# Patient Record
Sex: MALE | Race: WHITE | NOT HISPANIC OR LATINO | Employment: OTHER | URBAN - METROPOLITAN AREA
[De-identification: names, ages, dates, MRNs, and addresses within clinical notes are randomized per-mention and may not be internally consistent; named-entity substitution may affect disease eponyms.]

---

## 2019-09-26 ENCOUNTER — OFFICE VISIT (OUTPATIENT)
Dept: FAMILY MEDICINE | Facility: CLINIC | Age: 64
End: 2019-09-26
Payer: COMMERCIAL

## 2019-09-26 VITALS
HEIGHT: 67 IN | BODY MASS INDEX: 29.95 KG/M2 | HEART RATE: 67 BPM | WEIGHT: 190.81 LBS | DIASTOLIC BLOOD PRESSURE: 84 MMHG | SYSTOLIC BLOOD PRESSURE: 130 MMHG

## 2019-09-26 DIAGNOSIS — Z23 NEED FOR PNEUMOCOCCAL VACCINATION: ICD-10-CM

## 2019-09-26 DIAGNOSIS — Z12.5 SCREENING FOR PROSTATE CANCER: ICD-10-CM

## 2019-09-26 DIAGNOSIS — I10 ESSENTIAL HYPERTENSION, BENIGN: ICD-10-CM

## 2019-09-26 DIAGNOSIS — L57.0 ACTINIC KERATOSES: ICD-10-CM

## 2019-09-26 DIAGNOSIS — E78.2 MIXED HYPERLIPIDEMIA: Primary | ICD-10-CM

## 2019-09-26 PROCEDURE — G0009 ADMIN PNEUMOCOCCAL VACCINE: HCPCS | Mod: S$GLB,,, | Performed by: FAMILY MEDICINE

## 2019-09-26 PROCEDURE — G0009 PNEUMOCOCCAL POLYSACCHARIDE VACCINE 23-VALENT =>2YO SQ IM: ICD-10-PCS | Mod: S$GLB,,, | Performed by: FAMILY MEDICINE

## 2019-09-26 PROCEDURE — 90732 PNEUMOCOCCAL POLYSACCHARIDE VACCINE 23-VALENT =>2YO SQ IM: ICD-10-PCS | Mod: S$GLB,,, | Performed by: FAMILY MEDICINE

## 2019-09-26 PROCEDURE — 99214 PR OFFICE/OUTPT VISIT, EST, LEVL IV, 30-39 MIN: ICD-10-PCS | Mod: 25,S$GLB,, | Performed by: FAMILY MEDICINE

## 2019-09-26 PROCEDURE — 90732 PPSV23 VACC 2 YRS+ SUBQ/IM: CPT | Mod: S$GLB,,, | Performed by: FAMILY MEDICINE

## 2019-09-26 PROCEDURE — 99214 OFFICE O/P EST MOD 30 MIN: CPT | Mod: 25,S$GLB,, | Performed by: FAMILY MEDICINE

## 2019-09-26 RX ORDER — PRAVASTATIN SODIUM 20 MG/1
TABLET ORAL
COMMUNITY
Start: 2019-09-14 | End: 2019-09-26 | Stop reason: ALTCHOICE

## 2019-09-26 RX ORDER — AMLODIPINE AND BENAZEPRIL HYDROCHLORIDE 10; 20 MG/1; MG/1
1 CAPSULE ORAL DAILY
Qty: 90 CAPSULE | Refills: 1 | Status: SHIPPED | OUTPATIENT
Start: 2019-09-26 | End: 2020-05-06 | Stop reason: SDUPTHER

## 2019-09-26 RX ORDER — ATORVASTATIN CALCIUM 40 MG/1
40 TABLET, FILM COATED ORAL DAILY
Qty: 90 TABLET | Refills: 3 | Status: SHIPPED | OUTPATIENT
Start: 2019-09-26 | End: 2020-11-09 | Stop reason: SDUPTHER

## 2019-09-26 RX ORDER — AMLODIPINE AND BENAZEPRIL HYDROCHLORIDE 10; 20 MG/1; MG/1
CAPSULE ORAL
Refills: 1 | COMMUNITY
Start: 2019-08-24 | End: 2019-09-26 | Stop reason: SDUPTHER

## 2019-09-26 NOTE — PROGRESS NOTES
SUBJECTIVE:    Patient ID: Aleksandar Knight is a 64 y.o. male.    Chief Complaint: Hypertension (check up) and Hyperlipidemia    Patient with hypertension hyperlipidemia doing well on current therapy.  He will be having colon cancer screening on next month.  He had recent issues with actinic keratosis ease and has followed up with Dermatology.  Biopsies demonstrated that nothing was pre cancerous.  He has regular follow-up in 6 months.  He is up-to-date with immunizations.  Lab review demonstrates that LDL could be a bit lower.  It has improved over the last year and a half.  Medication adjustments should be made.      Past Medical History:   Diagnosis Date    Hyperlipidemia     Hypertension      History reviewed. No pertinent surgical history.  History reviewed. No pertinent family history.    Marital Status: Single  Alcohol History:  reports that he does not drink alcohol.  Tobacco History:  reports that he has never smoked. He has never used smokeless tobacco.  Drug History:  reports that he does not use drugs.    Review of patient's allergies indicates:  No Known Allergies    Current Outpatient Medications:     amlodipine-benazepril 10-20mg (LOTREL) 10-20 mg per capsule, Take 1 capsule by mouth once daily., Disp: 90 capsule, Rfl: 1    atorvastatin (LIPITOR) 40 MG tablet, Take 1 tablet (40 mg total) by mouth once daily. For cholesterol, Disp: 90 tablet, Rfl: 3    Review of Systems   Constitutional: Negative for fatigue, fever and unexpected weight change.   HENT: Negative for congestion, postnasal drip, rhinorrhea and sore throat.    Eyes: Negative for photophobia, pain and visual disturbance.   Respiratory: Negative for cough, shortness of breath and wheezing.    Cardiovascular: Negative for chest pain and palpitations.   Gastrointestinal: Negative for constipation, diarrhea, nausea and vomiting.   Genitourinary: Negative for difficulty urinating, dysuria, frequency, hematuria and urgency.   Musculoskeletal:  "Negative for arthralgias and myalgias.   Skin: Negative for rash.   Neurological: Negative for dizziness and headaches.   Psychiatric/Behavioral: Negative for sleep disturbance.          Objective:      Vitals:    09/26/19 0810   BP: 130/84   Pulse: 67   Weight: 86.5 kg (190 lb 12.8 oz)   Height: 5' 7" (1.702 m)     Physical Exam   Constitutional: He appears well-developed and well-nourished.   HENT:   Head: Normocephalic.   Mouth/Throat: Oropharynx is clear and moist.   Eyes: Pupils are equal, round, and reactive to light. Conjunctivae and EOM are normal.   Neck: Normal range of motion. Neck supple. No thyromegaly present.   Cardiovascular: Normal rate and regular rhythm. Exam reveals no friction rub.   No murmur heard.  Pulmonary/Chest: Effort normal and breath sounds normal.   Abdominal: Soft. Bowel sounds are normal. He exhibits no mass. There is no tenderness.   Musculoskeletal: Normal range of motion. He exhibits no edema.   Neurological: No cranial nerve deficit. Coordination normal.   Skin: Skin is warm and dry. No rash noted.   Psychiatric: He has a normal mood and affect.         Assessment:       1. Mixed hyperlipidemia    2. Essential hypertension, benign    3. Actinic keratoses    4. Screening for prostate cancer    5. Need for pneumococcal vaccination         Plan:       Mixed hyperlipidemia  -     atorvastatin (LIPITOR) 40 MG tablet; Take 1 tablet (40 mg total) by mouth once daily. For cholesterol  Dispense: 90 tablet; Refill: 3  -     Comprehensive metabolic panel; Future; Expected date: 03/02/2020  -     Lipid panel; Future; Expected date: 03/02/2020    Essential hypertension, benign  -     amlodipine-benazepril 10-20mg (LOTREL) 10-20 mg per capsule; Take 1 capsule by mouth once daily.  Dispense: 90 capsule; Refill: 1  -     CBC auto differential; Future; Expected date: 03/02/2020  -     Comprehensive metabolic panel; Future; Expected date: 03/02/2020    Actinic keratoses  Comments:  Continue per " Dr. Mohr    Screening for prostate cancer  -     PSA, Screening; Future; Expected date: 03/02/2020    Need for pneumococcal vaccination  -     Pneumococcal Polysaccharide Vaccine (23 Valent) (SQ/IM)      Follow up in about 6 months (around 3/26/2020) for Annual Physical.

## 2019-09-26 NOTE — PATIENT INSTRUCTIONS
Understanding Your Cholesterol Numbers  The higher your blood cholesterol, the greater your risk for heart attack (acute myocardial infarction), cardiovascular disease, or stroke. High cholesterol can cause any artery in your body to become narrow. Thats why you need to know your cholesterol level. If its high, you can take steps to bring it down. Eating the right foods and getting enough exercise can help. Some people also need medicine to control their cholesterol. Your healthcare provider can help you get started on a plan to control your cholesterol.        Blood flows easily when arteries are clear.      Less blood flows when cholesterol builds up in artery walls.   Checking your cholesterol  Your cholesterol is checked with a simple blood test. The results tell you how much cholesterol you have in your blood. Get checked as often as your healthcare provider suggests. If you have diabetes or high cholesterol, you may need your blood tested as often as every 3 months. As you work to lower your cholesterol, your numbers will change slowly. But they will change. Be patient and stay on track. Discuss your numbers with your healthcare provider.   Your total cholesterol number  A blood test will give you a number for the total amount of cholesterol in your blood. The higher this number, the more likely it is that cholesterol will build up in your blood vessels. Even if your cholesterol is just slightly high, you are at increased risk for health problems.  My total cholesterol is: ________________  Your lipid numbers  Total cholesterol is just one part of the story. Cholesterol is made up of different kinds of fats (lipids). If your total cholesterol is high, knowing your lipid profile is important. The 2 most important lipids are HDL and LDL. Lipids are checked after you have fasted. This means you dont eat for a certain amount of time before the test is done. Along with cholesterol, triglyceride can also lead  to blocked arteries. Triglycerides are another type of fat. Knowing your HDL, LDL, and triglyceride numbers, as well as your total cholesterol gives you a more complete picture of your cholesterol level:  · HDL is called the good cholesterol. It moves out of the bloodstream and does not block your blood vessels. HDL levels are affected by how much you exercise and what you eat.My HDL cholesterol is: ________________  · LDL is called the bad cholesterol. This is because it can stick to your artery walls and block blood flow. LDL levels are most affected by what you eat and by your genes.My LDL cholesterol is: ________________  · Triglyceride is a type of fat the body uses to store energy. Too much triglyceride can increase your risk for heart disease. Triglyceride levels should be under 150.My triglyceride is:  ________________  Based on your other health issues and risk factors, your healthcare provider may have specific goals for treating your cholesterol. You may need to use different kinds of medicines that work on the different types of cholesterol. Work with your provider to know what your goals of treatment are. Stick with your treatment plan to reach the goals you set.  High-risk groups  Some people may need to take medicines called statins to control their cholesterol. This is in addition to eating a healthy diet and getting regular exercise.  Statins can help you stay healthy. They can also help prevent a heart attack or stroke. You may need to take a statin if you are in one of these groups:  · Adults who have had a heart attack or stroke. Or adults who have had peripheral vascular disease, a ministroke (transient ischemic attack), or stable or unstable angina. This group also includes people who have had a procedure to restore blood flow through a blocked artery. These procedures include percutaneous coronary intervention, angioplasty, stent, and open-heart bypass surgery.  · Adults who have diabetes.  Or adults who are at higher risk of having a heart attack or stroke and have an LDL cholesterol level of 70 to 189 mg/dL  · Adults who are 21 years old or older and have an LDL cholesterol level of 190 mg/dL or higher.  If you are in a high-risk group, talk with your healthcare provider about your treatment goals. Make sure you understand why these goals are important, based on your own health history and your family history of heart disease or high cholesterol.  Make a plan to have regular cholesterol checks. You may need to fast before getting this test. Also ask your provider about any side effects your medicines may cause. Let your provider know about any side effects you have. You may need to take more than one medicine to reach the cholesterol goals that you and your provider decide on.  Date Last Reviewed: 10/1/2016  © 7411-7525 [employer]. [address] [place] [address], [place] [address]. All rights reserved. This information is not intended as a substitute for professional medical care. Always follow your healthcare professional's instructions.

## 2019-10-31 ENCOUNTER — OFFICE VISIT (OUTPATIENT)
Dept: GASTROENTEROLOGY | Facility: CLINIC | Age: 64
End: 2019-10-31
Payer: COMMERCIAL

## 2019-10-31 VITALS
BODY MASS INDEX: 31.39 KG/M2 | SYSTOLIC BLOOD PRESSURE: 161 MMHG | TEMPERATURE: 98 F | WEIGHT: 200 LBS | HEIGHT: 67 IN | DIASTOLIC BLOOD PRESSURE: 71 MMHG | HEART RATE: 78 BPM

## 2019-10-31 DIAGNOSIS — Z12.11 ENCOUNTER FOR SCREENING COLONOSCOPY: Primary | ICD-10-CM

## 2019-10-31 DIAGNOSIS — E78.5 HYPERLIPIDEMIA, UNSPECIFIED HYPERLIPIDEMIA TYPE: ICD-10-CM

## 2019-10-31 DIAGNOSIS — R10.9 ABDOMINAL PAIN, UNSPECIFIED ABDOMINAL LOCATION: Primary | ICD-10-CM

## 2019-10-31 DIAGNOSIS — I10 ESSENTIAL HYPERTENSION: ICD-10-CM

## 2019-10-31 PROCEDURE — 99205 PR OFFICE/OUTPT VISIT, NEW, LEVL V, 60-74 MIN: ICD-10-PCS | Mod: S$GLB,,, | Performed by: INTERNAL MEDICINE

## 2019-10-31 PROCEDURE — 99205 OFFICE O/P NEW HI 60 MIN: CPT | Mod: S$GLB,,, | Performed by: INTERNAL MEDICINE

## 2019-10-31 RX ORDER — SODIUM, POTASSIUM,MAG SULFATES 17.5-3.13G
1 SOLUTION, RECONSTITUTED, ORAL ORAL DAILY
Qty: 1 KIT | Refills: 0 | Status: SHIPPED | OUTPATIENT
Start: 2019-10-31 | End: 2019-11-02

## 2019-10-31 NOTE — PROGRESS NOTES
[facility] New Patient Visit    Subjective:           PCP: Aditya Pulliam    Referring Provider: No ref. provider found    Chief Complaint: Colonoscopy       HPI:  Aleksandar Knight is a 64 y.o. male here for evaluation of colonoscopy.  There is no history of previous colonoscopy. Patient denies history of dysphagia, odynophagia, early satiety, hematemesis, fever, chills, or rigors.  Patient has hx of htn and hld. He denies lower GI symptoms or rectal bleeding. There are no urinary complaints. Patient does not smoke or drink or chew tobacco. A detailed physical examination is noncontributory. He may need to be checked for hepatitis as per guidelines. There is a fhx of cancer.     ROS:   Constitutional: No fevers, chills, weight loss  ENT: No allergies, sore throat, rhinorrhea  CV: No chest pain, palpitations, edema  Pulm: No cough, shortness of breath, wheezing  Ophtho: No vision changes  GI: No blood in stools, change in bowel habits, nausea/vomiting  Denies alternating diarrhea/constipation.   Derm: No rash  Heme: No easy bruising or lymphadenopathy  MSK: No arthralgias or myalgias  : No dysuria, hematuria, frequency, polyuria, or flank tenderness  Endo: No hot or cold intolerance  Neuro: No syncope or seizure, or dizziness  Psych: No hallucination, depression or suicidal ideation      Medical History:  has a past medical history of Hyperlipidemia and Hypertension.      Surgical History:  has no past surgical history on file.    Family History:   Family History   Problem Relation Age of Onset    Cancer Father         unkn type       Social History:   Social History     Tobacco Use    Smoking status: Never Smoker    Smokeless tobacco: Never Used   Substance Use Topics    Alcohol use: Never     Frequency: Never    Drug use: Never       The patient's social and family histories were reviewed by me and updated in the appropriate section of the electronic medical record.    Allergies: Review of  "patient's allergies indicates:  No Known Allergies    Medications:   Current Outpatient Medications   Medication Sig Dispense Refill    amlodipine-benazepril 10-20mg (LOTREL) 10-20 mg per capsule Take 1 capsule by mouth once daily. 90 capsule 1    atorvastatin (LIPITOR) 40 MG tablet Take 1 tablet (40 mg total) by mouth once daily. For cholesterol 90 tablet 3     No current facility-administered medications for this visit.      All medications and past history have been reviewed.        Objective:        Vital Signs:  Blood pressure (!) 161/71, pulse 78, temperature 97.8 °F (36.6 °C), height 5' 7" (1.702 m), weight 90.7 kg (200 lb). Body mass index is 31.32 kg/m².    Physical Exam:   General Appearance: Well appearing in no acute distress, well developed, well                 nourished  Head: Normocephalic, without obvious abnormality  Eyes:  Pupils equal, round and reactive to light  Throat: Lips, mucosa, and tongue normal; teeth and gums normal  Lungs: CTA bilaterally in anterior and posterior fields, no wheezes, no crackles  Heart:  Regular rate and rhythm, no murmurs heard  Abdomen: Soft, non tender, non distended with positive bowel sounds in all four quadrants. No hepatosplenomegaly, ascites, or mass. Negative for succusion splash  : male  Extremities: No cyanosis, edema or deformity  Skin: No rash  Neurologic: Normal exam      Labs/Imaging:  All lab results and imaging results have been reviewed and discussed with the patient.    Assessment/Plan:    Assessment:       No diagnosis found.    Plan:       There are no diagnoses linked to this encounter.    See HPI    No follow-ups on file.    The plan was discussed with the patient and all questions/concerns have been answered to the patient's satisfaction.    CC: No ref. provider found    Electronically signed by Nabeel Hart MD    This note was dictated using voice recognition software and may contain grammatical errors.        "

## 2019-11-20 ENCOUNTER — HOSPITAL ENCOUNTER (OUTPATIENT)
Facility: HOSPITAL | Age: 64
Discharge: HOME | End: 2019-11-20
Attending: INTERNAL MEDICINE | Admitting: INTERNAL MEDICINE
Payer: COMMERCIAL

## 2019-11-20 VITALS
HEART RATE: 52 BPM | BODY MASS INDEX: 31.08 KG/M2 | RESPIRATION RATE: 12 BRPM | DIASTOLIC BLOOD PRESSURE: 44 MMHG | WEIGHT: 198 LBS | SYSTOLIC BLOOD PRESSURE: 110 MMHG | HEIGHT: 67 IN | OXYGEN SATURATION: 100 % | TEMPERATURE: 98 F

## 2019-11-20 DIAGNOSIS — Z12.11 SCREEN FOR COLON CANCER: ICD-10-CM

## 2019-11-20 PROCEDURE — 63600175 PHARM REV CODE 636 W HCPCS: Performed by: INTERNAL MEDICINE

## 2019-11-20 PROCEDURE — 45380 PR COLONOSCOPY,BIOPSY: ICD-10-PCS | Mod: 33,,, | Performed by: INTERNAL MEDICINE

## 2019-11-20 PROCEDURE — 45380 COLONOSCOPY AND BIOPSY: CPT | Performed by: INTERNAL MEDICINE

## 2019-11-20 PROCEDURE — 27200043 HC FORCEPS, BIOPSY: Performed by: INTERNAL MEDICINE

## 2019-11-20 PROCEDURE — 45380 COLONOSCOPY AND BIOPSY: CPT | Mod: 33,,, | Performed by: INTERNAL MEDICINE

## 2019-11-20 PROCEDURE — 99152 MOD SED SAME PHYS/QHP 5/>YRS: CPT | Performed by: INTERNAL MEDICINE

## 2019-11-20 PROCEDURE — 99153 MOD SED SAME PHYS/QHP EA: CPT | Performed by: INTERNAL MEDICINE

## 2019-11-20 PROCEDURE — 88305 TISSUE EXAM BY PATHOLOGIST: CPT | Mod: TC,59

## 2019-11-20 RX ORDER — SODIUM CHLORIDE 9 MG/ML
INJECTION, SOLUTION INTRAVENOUS CONTINUOUS
Status: DISCONTINUED | OUTPATIENT
Start: 2019-11-20 | End: 2019-11-20 | Stop reason: HOSPADM

## 2019-11-20 RX ORDER — SODIUM CHLORIDE 0.9 % (FLUSH) 0.9 %
2 SYRINGE (ML) INJECTION
Status: DISCONTINUED | OUTPATIENT
Start: 2019-11-20 | End: 2019-11-20 | Stop reason: HOSPADM

## 2019-11-20 RX ORDER — MEPERIDINE HYDROCHLORIDE 100 MG/ML
INJECTION INTRAMUSCULAR; INTRAVENOUS; SUBCUTANEOUS
Status: COMPLETED | OUTPATIENT
Start: 2019-11-20 | End: 2019-11-20

## 2019-11-20 RX ORDER — MIDAZOLAM HYDROCHLORIDE 1 MG/ML
INJECTION INTRAMUSCULAR; INTRAVENOUS
Status: COMPLETED | OUTPATIENT
Start: 2019-11-20 | End: 2019-11-20

## 2019-11-20 RX ORDER — DIAZEPAM 10 MG/2ML
INJECTION INTRAMUSCULAR
Status: COMPLETED | OUTPATIENT
Start: 2019-11-20 | End: 2019-11-20

## 2019-11-20 RX ADMIN — Medication 50 MG: at 11:11

## 2019-11-20 RX ADMIN — MIDAZOLAM HYDROCHLORIDE 1 MG: 1 INJECTION, SOLUTION INTRAMUSCULAR; INTRAVENOUS at 12:11

## 2019-11-20 RX ADMIN — Medication 25 MG: at 12:11

## 2019-11-20 RX ADMIN — DIAZEPAM 5 MG: 5 INJECTION, SOLUTION INTRAMUSCULAR; INTRAVENOUS at 11:11

## 2019-11-20 RX ADMIN — MIDAZOLAM HYDROCHLORIDE 2 MG: 1 INJECTION, SOLUTION INTRAMUSCULAR; INTRAVENOUS at 11:11

## 2019-11-20 NOTE — H&P
[facility]  History & Physical - Short Stay    Subjective:      Chief Complaint/Reason for Admission: Colonoscopy    There are no hospital problems to display for this patient.      History of Present Illness:    Aleksandar Knight is a 64 y.o. male here for evaluation of colonoscopy.  There is no history of previous colonoscopy. Patient denies history of dysphagia, odynophagia, early satiety, hematemesis, fever, chills, or rigors.  Patient has hx of htn and hld. He denies lower GI symptoms or rectal bleeding. There are no urinary complaints. Patient does not smoke or drink or chew tobacco. A detailed physical examination is noncontributory. He may need to be checked for hepatitis as per guidelines. There is a fhx of cancer.  No current facility-administered medications for this encounter.     Current Outpatient Medications:     amlodipine-benazepril 10-20mg (LOTREL) 10-20 mg per capsule, Take 1 capsule by mouth once daily., Disp: 90 capsule, Rfl: 1    atorvastatin (LIPITOR) 40 MG tablet, Take 1 tablet (40 mg total) by mouth once daily. For cholesterol, Disp: 90 tablet, Rfl: 3  No medications prior to admission.       Review of patient's allergies indicates:  No Known Allergies     Past Medical History:   Diagnosis Date    Hyperlipidemia     Hypertension            OBJECTIVE:     Vital Signs (Most Recent):          Physical Exam:  General Appearance: Well appearing in no acute distress, well developed, well                 nourished  Head: Normocephalic, without obvious abnormality  Eyes:  Pupils equal, round and reactive to light  Throat: Lips, mucosa, and tongue normal; teeth and gums normal  Lungs: CTA bilaterally in anterior and posterior fields, no wheezes, no crackles  Heart:  Regular rate and rhythm, no murmurs heard  Abdomen: Soft, non tender, non distended with positive bowel sounds in all four quadrants. No hepatosplenomegaly, ascites, or mass. Negative for succusion splash  :  male  Extremities: No cyanosis, edema or deformity  Skin: No rash  Neurologic: Normal exam      ASSESSMENT/PLAN:         Plan: Colonoscopy

## 2019-11-20 NOTE — DISCHARGE INSTRUCTIONS
Understanding Colon and Rectal Polyps    The colon (also called the large intestine) is a muscular tube that forms the last part of the digestive tract. It absorbs water and stores food waste. The colon is about 4 to 6 feet long. The rectum is the last 6 inches of the colon. The colon and rectum have a smooth lining composed of millions of cells. Changes in these cells can lead to growths in the colon that can become cancerous and should be removed. Multiple tests are available to screen for colon cancer, but the colonoscopy is the most recommended test. During colonoscopy, these polyps can be removed. How often you need this test depends on many things including your condition, your family history, symptoms, and what the findings were at the previous colonoscopy.   When the colon lining changes  Changes that happen in the cells that line the colon or rectum can lead to growths called polyps. Over a period of years, polyps can turn cancerous. Removing polyps early may prevent cancer from ever forming.  Polyps  Polyps are fleshy clumps of tissue that form on the lining of the colon or rectum. Small polyps are usually benign (not cancerous). However, over time, cells in a polyp can change and become cancerous. Certain types of polyps known as adenomatous polyps are premalignant. The risk for invasive cancer increases with the size of the polyp and certain cell and gene features. This means that they can become cancerous if they're not removed. Hyperplastic polyps are benign. They can grow quite large and not turn cancerous.   Cancer  Almost all colorectal cancers start when polyp cells begin growing abnormally. As a cancerous tumor grows, it may involve more and more of the colon or rectum. In time, cancer can also grow beyond the colon or rectum and spread to nearby organs or to glands called lymph nodes. The cells can also travel to other parts of the body. This is known as metastasis. The earlier a cancerous  tumor is removed, the better the chance of preventing its spread.    Date Last Reviewed: 8/1/2016  © 3264-3094 [employer]. [address] [place] [address], [place] [address]. All rights reserved. This information is not intended as a substitute for professional medical care. Always follow your healthcare professional's instructions.        Eating a High-Fiber Diet  Fiber is what gives strength and structure to plants. Most grains, beans, vegetables, and fruits contain fiber. Foods rich in fiber are often low in calories and fat, and they fill you up more. They may also reduce your risks for certain health problems. To find out the amount of fiber in canned, packaged, or frozen foods, read the Nutrition Facts label. It tells you how much fiber is in a serving.    Types of fiber and their benefits  There are two types of fiber: insoluble and soluble. They both aid digestion and help you maintain a healthy weight.  · Insoluble fiber. This is found in whole grains, cereals, certain fruits and vegetables such as apple skin, corn, and carrots. Insoluble fiber may prevent constipation and reduce the risk for certain types of cancer.  · Soluble fiber. This type of fiber is in oats, beans, and certain fruits and vegetables such as strawberries and peas. Soluble fiber can reduce cholesterol, which may help lower the risk for heart disease. It also helps control blood sugar levels.  Look for high-fiber foods  Try these foods to add fiber to your diet:  · Whole-grain breads and cereals. Try to eat 6 to 8 ounces a day. Include wheat and oat bran cereals, whole-wheat muffins or toast, and corn tortillas in your meals.  · Fruits. Try to eat 2 cups a day. Apples, oranges, strawberries, pears, and bananas are good sources. (Note: Fruit juice is low in fiber.)  · Vegetables. Try to eat at least 2.5 cups a day. Add asparagus, carrots, broccoli, peas, and corn to your meals.  · Beans. One cup of cooked lentils gives you over 15  grams of fiber. Try navy beans, lentils, and chickpeas.  · Seeds. A small handful of seeds gives you about 3 grams of fiber. Try sunflower seeds.  Keep track of your fiber  Keep track of how much fiber you eat. Start by reading food labels. Then eat a variety of foods high in fiber. As you begin to eat more fiber, ask your healthcare provider how much water you should be drinking to keep your digestive system working smoothly.  You should aim for a certain amount of fiber in your diet each day. If you are a woman, that amount is between 25 and 28 grams per day. Men should aim for 30 to 33 grams per day. After age 50, your daily fiber needs drop to 22 grams for women and 28 grams for men.  Before you reach for the fiber supplements, think about this. Fiber is found naturally in healthy whole foods. It gives you that feeling of fullness after you eat. Taking fiber supplements or eating fiber-enriched foods will not give you this full feeling.  Your fiber intake is a good measure for the quality of your overall diet. If you are missing out on your daily amount of fiber, you may be lacking other important nutrients as well.  Date Last Reviewed: 5/11/2015  © 2800-0212 [employer]. [address] [place] [address], [place] [address]. All rights reserved. This information is not intended as a substitute for professional medical care. Always follow your healthcare professional's instructions.        Colonoscopy     A camera attached to a flexible tube with a viewing lens is used to take video pictures.     Colonoscopy is a test to view the inside of your lower digestive tract (colon and rectum). Sometimes it can show the last part of the small intestine (ileum). During the test, small pieces of tissue may be removed for testing. This is called a biopsy. Small growths, such as polyps, may also be removed.   Why is colonoscopy done?  The test is done to help look for colon cancer. And it can help find the source of  abdominal pain, bleeding, and changes in bowel habits. It may be needed once a year, depending on factors such as your:  · Age  · Health history  · Family health history  · Symptoms  · Results from any prior colonoscopy  Risks and possible complications  These include:  · Bleeding               · A puncture or tear in the colon   · Risks of anesthesia  · A cancer lesion not being seen  Getting ready   To prepare for the test:  · Talk with your healthcare provider about the risks of the test (see below). Also ask your healthcare provider about alternatives to the test.  · Tell your healthcare provider about any medicines you take. Also tell him or her about any health conditions you may have.  · Make sure your rectum and colon are empty for the test. Follow the diet and bowel prep instructions exactly. If you dont, the test may need to be rescheduled.  · Plan for a friend or family member to drive you home after the test.     Colonoscopy provides an inside view of the entire colon.     You may discuss the results with your doctor right away or at a future visit.  During the test   The test is usually done in the hospital on an outpatient basis. This means you go home the same day. The procedure takes about 30 minutes. During that time:  · You are given relaxing (sedating) medicine through an IV line. You may be drowsy, or fully asleep.  · The healthcare provider will first give you a physical exam to check for anal and rectal problems.  · Then the anus is lubricated and the scope inserted.  · If you are awake, you may have a feeling similar to needing to have a bowel movement. You may also feel pressure as air is pumped into the colon. Its OK to pass gas during the procedure.  · Biopsy, polyp removal, or other treatments may be done during the test.  After the test   You may have gas right after the test. It can help to try to pass it to help prevent later bloating. Your healthcare provider may discuss the results  with you right away. Or you may need to schedule a follow-up visit to talk about the results. After the test, you can go back to your normal eating and other activities. You may be tired from the sedation and need to rest for a few hours.  Date Last Reviewed: 11/1/2016  © 9868-1509 [email or web address]. [address] [place] [address], [place] [address]. All rights reserved. This information is not intended as a substitute for professional medical care. Always follow your healthcare professional's instructions.    No Driving, making important decisions for the next 24 hours   Report to ER   1 tablespoon of Bright red blood from rectum   Sever chest pain   Sever shortness of breath  Sever abdominal pain   No Asprin products or NSIDS for 1 [place]

## 2019-11-23 NOTE — [employer] PATIENT INSTRUCTIONS
Discharge Summary/Instructions after an Endoscopic Procedure  Patient Name: Aleksandar Knight  Patient MRN: [id number]  Patient YOB: 1955 Wednesday, November 20, 2019  Nabeel Hart MD  RESTRICTIONS:  During your procedure today, you received medications for sedation.  These   medications may affect your judgment, balance and coordination.  Therefore,   for 24 hours, you have the following restrictions:   - DO NOT drive a car, operate machinery, make legal/financial decisions,   sign important papers or drink alcohol.    ACTIVITY:  Today: no heavy lifting, straining or running due to procedural   sedation/anesthesia.  The following day: return to full activity including work.  DIET:  Eat and drink normally unless instructed otherwise.     TREATMENT FOR COMMON SIDE EFFECTS:  - Mild abdominal pain, nausea, belching, bloating or excessive gas:  rest,   eat lightly and use a heating pad.  - Sore Throat: treat with throat lozenges and/or gargle with warm salt   water.  - Because air was used during the procedure, expelling large amounts of air   from your rectum or belching is normal.  - If a bowel prep was taken, you may not have a bowel movement for 1-3 days.    This is normal.  SYMPTOMS TO WATCH FOR AND REPORT TO YOUR PHYSICIAN:  1. Abdominal pain or bloating, other than gas cramps.  2. Chest pain.  3. Back pain.  4. Signs of infection such as: chills or fever occurring within 24 hours   after the procedure.  5. Rectal bleeding, which would show as bright red, maroon, or black stools.   (A tablespoon of blood from the rectum is not serious, especially if   hemorrhoids are present.)  6. Vomiting.  7. Weakness or dizziness.  GO DIRECTLY TO THE NEAREST EMERGENCY ROOM IF YOU HAVE ANY OF THE FOLLOWING:      Difficulty breathing              Chills and/or fever over 101 F   Persistent vomiting and/or vomiting blood   Severe abdominal pain   Severe chest pain   Black, tarry stools   Bleeding- more than one  tablespoon   Any other symptom or condition that you feel may need urgent attention  Your doctor recommends these additional instructions:  If any biopsies were taken, your doctors clinic will contact you in 1 to 2   weeks with any results.  - Discharge patient to home (with escort).  For questions, problems or results please call your physician - Nabeel Hart MD at Work:  [phone].  [facility], EMERGENCY ROOM PHONE NUMBER: [phone]  IF A COMPLICATION OR EMERGENCY SITUATION ARISES AND YOU ARE UNABLE TO REACH   YOUR PHYSICIAN - GO DIRECTLY TO THE EMERGENCY ROOM.  Nabeel Hart MD  11/22/2019 6:11:43 PM  This report has been verified and signed electronically.  [employer]

## 2019-12-03 ENCOUNTER — OFFICE VISIT (OUTPATIENT)
Dept: GASTROENTEROLOGY | Facility: CLINIC | Age: 64
End: 2019-12-03
Payer: COMMERCIAL

## 2019-12-03 VITALS
HEIGHT: 67 IN | SYSTOLIC BLOOD PRESSURE: 141 MMHG | WEIGHT: 198.19 LBS | BODY MASS INDEX: 31.11 KG/M2 | HEART RATE: 85 BPM | TEMPERATURE: 97 F | DIASTOLIC BLOOD PRESSURE: 73 MMHG

## 2019-12-03 DIAGNOSIS — Z80.9 FAMILY HISTORY OF CANCER: ICD-10-CM

## 2019-12-03 DIAGNOSIS — K64.9 HEMORRHOIDS, UNSPECIFIED HEMORRHOID TYPE: ICD-10-CM

## 2019-12-03 DIAGNOSIS — K63.5 POLYP OF SIGMOID COLON, UNSPECIFIED TYPE: Primary | ICD-10-CM

## 2019-12-03 PROCEDURE — 1126F AMNT PAIN NOTED NONE PRSNT: CPT | Mod: S$GLB,,, | Performed by: INTERNAL MEDICINE

## 2019-12-03 PROCEDURE — 1159F MED LIST DOCD IN RCRD: CPT | Mod: S$GLB,,, | Performed by: INTERNAL MEDICINE

## 2019-12-03 PROCEDURE — 99214 PR OFFICE/OUTPT VISIT, EST, LEVL IV, 30-39 MIN: ICD-10-PCS | Mod: S$GLB,,, | Performed by: INTERNAL MEDICINE

## 2019-12-03 PROCEDURE — 99214 OFFICE O/P EST MOD 30 MIN: CPT | Mod: S$GLB,,, | Performed by: INTERNAL MEDICINE

## 2019-12-03 PROCEDURE — 1159F PR MEDICATION LIST DOCUMENTED IN MEDICAL RECORD: ICD-10-PCS | Mod: S$GLB,,, | Performed by: INTERNAL MEDICINE

## 2019-12-03 PROCEDURE — 1126F PR PAIN SEVERITY QUANTIFIED, NO PAIN PRESENT: ICD-10-PCS | Mod: S$GLB,,, | Performed by: INTERNAL MEDICINE

## 2019-12-03 NOTE — PROGRESS NOTES
[facility] Established Patient Visit    Subjective:           PCP: Aditya Pulliam    Chief Complaint: Follow-up and Colonoscopy       HPI:  Aleksandar Knight Sr. is a 64 y.o. male here for follow-up of colonoscopy, colonoscopy showed 5 mm polyp in the sigmoid colon, diverticula, hemorrhoids, detailed physical examination was done, history of dysphagia or odynophagia or early satiety hematemesis fever chills or rigors, patient has hyperlipidemia, history of hypertension,  Biopsies were reviewed with the pathologist, this showed no histopathology diagnosis, detailed examination is noncontributory, meds reviewed,see patient back in 4-6 months patient, did not have his blood drawn and will be reminded that he needs to do so continue same management at this time. BMI 31.04, family history of cancer    ROS:   Constitutional: No fevers, chills, weight loss  ENT: No allergies, sore throat, rhinorrhea  CV: No chest pain, palpitations, edema  Pulm: No cough, shortness of breath, wheezing  Ophtho: No vision changes  GI: No blood in stools, change in bowel habits, nausea/vomiting  BMI 31.04, diverticula, hemorrhoids, colon polyps  Denies alternating diarrhea/constipation.   Derm: No rash  Heme: No easy bruising or lymphadenopathy  MSK: No arthralgias or myalgias  : No dysuria, hematuria, frequency, polyuria, or flank tenderness  Endo: No hot or cold intolerance  Neuro: No syncope or seizure, or dizziness  Psych: No hallucination, depression or suicidal ideation      Medical History:  has a past medical history of Hyperlipidemia and Hypertension.      Surgical History:  has a past surgical history that includes Colonoscopy (N/A, 11/20/2019).    Family History:   Family History   Problem Relation Age of Onset    Cancer Father         unkn type       Social History:   Social History     Tobacco Use    Smoking status: Never Smoker    Smokeless tobacco: Never Used   Substance Use Topics    Alcohol use: Never      "Frequency: Never    Drug use: Never       The patient's social and family histories were reviewed by me and updated in the appropriate section of the electronic medical record.    Allergies: Review of patient's allergies indicates:  No Known Allergies      Medications:   Current Outpatient Medications   Medication Sig Dispense Refill    amlodipine-benazepril 10-20mg (LOTREL) 10-20 mg per capsule Take 1 capsule by mouth once daily. 90 capsule 1    atorvastatin (LIPITOR) 40 MG tablet Take 1 tablet (40 mg total) by mouth once daily. For cholesterol 90 tablet 3     No current facility-administered medications for this visit.      All medications and past history have been reviewed.        Objective:        Vital Signs:  Blood pressure (!) 141/73, pulse 85, temperature 97.2 °F (36.2 °C), height 5' 7" (1.702 m), weight 89.9 kg (198 lb 3.2 oz). Body mass index is 31.04 kg/m².    Physical Exam:   General Appearance: Well appearing in no acute distress, well developed, well                nourished  Head: Normocephalic, without obvious abnormality  Eyes:  Pupils equal, round and reactive to light  Throat: Lips, mucosa, and tongue normal; teeth and gums normal  Lungs: CTA bilaterally in anterior and posterior fields, no wheezes, no crackles  Heart:  Regular rate and rhythm, no murmurs heard  Abdomen: Soft, non tender, non distended with positive bowel sounds in all four quadrants. No hepatosplenomegaly, ascites, or mass. Negative for succusion splash  : male   Extremities: No cyanosis, edema or deformity  Skin: No rash  Neurologic: Normal exam    Labs:  No results found for: WBC, HGB, HCT, PLT, CHOL, TRIG, HDL, LDLDIRECT, ALT, AST, NA, K, CL, CREATININE, BUN, CO2, TSH, PSA, INR, GLUF, HGBA1C, MICROALBUR    Imaging:     All lab results and imaging results have been reviewed and discussed with the patient      Assessment:       1. Polyp of sigmoid colon, unspecified type    2. Hemorrhoids, unspecified hemorrhoid type    3. " Family history of cancer    4. BMI 31.0-31.9,adult        Plan:       Aleksandar was seen today for follow-up and colonoscopy.    Diagnoses and all orders for this visit:    Polyp of sigmoid colon, unspecified type    Hemorrhoids, unspecified hemorrhoid type    Family history of cancer    BMI 31.0-31.9,adult        See HPI    Follow up in about 3 months (around 3/3/2020), or if symptoms worsen or fail to improve.    The plan was discussed with the patient and all questions/concerns have been answered to the patient's satisfaction.        Electronically signed by Nabeel Hart MD    This note was dictated using voice recognition software and may contain grammatical errors.

## 2020-03-14 LAB
ALBUMIN SERPL-MCNC: 4.6 G/DL (ref 3.6–5.1)
ALBUMIN/GLOB SERPL: 1.5 (CALC) (ref 1–2.5)
ALP SERPL-CCNC: 64 U/L (ref 35–144)
ALT SERPL-CCNC: 33 U/L (ref 9–46)
AST SERPL-CCNC: 22 U/L (ref 10–35)
BASOPHILS # BLD AUTO: 41 CELLS/UL (ref 0–200)
BASOPHILS NFR BLD AUTO: 0.7 %
BILIRUB SERPL-MCNC: 0.6 MG/DL (ref 0.2–1.2)
BUN SERPL-MCNC: 28 MG/DL (ref 7–25)
BUN/CREAT SERPL: 19 (CALC) (ref 6–22)
CALCIUM SERPL-MCNC: 9.6 MG/DL (ref 8.6–10.3)
CHLORIDE SERPL-SCNC: 104 MMOL/L (ref 98–110)
CHOLEST SERPL-MCNC: 126 MG/DL
CHOLEST/HDLC SERPL: 3.6 (CALC)
CO2 SERPL-SCNC: 26 MMOL/L (ref 20–32)
CREAT SERPL-MCNC: 1.49 MG/DL (ref 0.7–1.25)
EOSINOPHIL # BLD AUTO: 122 CELLS/UL (ref 15–500)
EOSINOPHIL NFR BLD AUTO: 2.1 %
ERYTHROCYTE [DISTWIDTH] IN BLOOD BY AUTOMATED COUNT: 12.8 % (ref 11–15)
GFRSERPLBLD MDRD-ARVRAT: 48 ML/MIN/1.73M2
GLOBULIN SER CALC-MCNC: 3 G/DL (CALC) (ref 1.9–3.7)
GLUCOSE SERPL-MCNC: 101 MG/DL (ref 65–99)
HCT VFR BLD AUTO: 37.9 % (ref 38.5–50)
HDLC SERPL-MCNC: 35 MG/DL
HGB BLD-MCNC: 12.9 G/DL (ref 13.2–17.1)
LDLC SERPL CALC-MCNC: 75 MG/DL (CALC)
LYMPHOCYTES # BLD AUTO: 1728 CELLS/UL (ref 850–3900)
LYMPHOCYTES NFR BLD AUTO: 29.8 %
MCH RBC QN AUTO: 29.1 PG (ref 27–33)
MCHC RBC AUTO-ENTMCNC: 34 G/DL (ref 32–36)
MCV RBC AUTO: 85.6 FL (ref 80–100)
MONOCYTES # BLD AUTO: 597 CELLS/UL (ref 200–950)
MONOCYTES NFR BLD AUTO: 10.3 %
NEUTROPHILS # BLD AUTO: 3312 CELLS/UL (ref 1500–7800)
NEUTROPHILS NFR BLD AUTO: 57.1 %
NONHDLC SERPL-MCNC: 91 MG/DL (CALC)
PLATELET # BLD AUTO: 252 THOUSAND/UL (ref 140–400)
PMV BLD REES-ECKER: 10.4 FL (ref 7.5–12.5)
POTASSIUM SERPL-SCNC: 5.3 MMOL/L (ref 3.5–5.3)
PROT SERPL-MCNC: 7.6 G/DL (ref 6.1–8.1)
PSA SERPL-MCNC: 1 NG/ML
RBC # BLD AUTO: 4.43 MILLION/UL (ref 4.2–5.8)
SODIUM SERPL-SCNC: 140 MMOL/L (ref 135–146)
TRIGL SERPL-MCNC: 80 MG/DL
WBC # BLD AUTO: 5.8 THOUSAND/UL (ref 3.8–10.8)

## 2020-03-24 ENCOUNTER — TELEPHONE (OUTPATIENT)
Dept: FAMILY MEDICINE | Facility: CLINIC | Age: 65
End: 2020-03-24

## 2020-03-24 NOTE — TELEPHONE ENCOUNTER
----- Message from Madison Bey MA sent at 3/24/2020  2:41 PM CDT -----  Pt would like to know if his appointment still stands for Thursday, 3/26 @ 8:15?    Pt - [phone]

## 2020-04-06 ENCOUNTER — TELEPHONE (OUTPATIENT)
Dept: FAMILY MEDICINE | Facility: CLINIC | Age: 65
End: 2020-04-06

## 2020-04-06 NOTE — TELEPHONE ENCOUNTER
----- Message from Aditya Pulliam MD sent at 4/6/2020  8:49 AM CDT -----  Please contact the patient and let them know that their labs were fine and do not require any change in treatment.

## 2020-05-06 ENCOUNTER — OFFICE VISIT (OUTPATIENT)
Dept: FAMILY MEDICINE | Facility: CLINIC | Age: 65
End: 2020-05-06
Payer: MEDICARE

## 2020-05-06 DIAGNOSIS — E78.2 MIXED HYPERLIPIDEMIA: Primary | ICD-10-CM

## 2020-05-06 DIAGNOSIS — N18.30 STAGE 3 CHRONIC KIDNEY DISEASE: ICD-10-CM

## 2020-05-06 DIAGNOSIS — I10 ESSENTIAL HYPERTENSION, BENIGN: ICD-10-CM

## 2020-05-06 PROCEDURE — 1101F PT FALLS ASSESS-DOCD LE1/YR: CPT | Mod: 95,,, | Performed by: FAMILY MEDICINE

## 2020-05-06 PROCEDURE — 1101F PR PT FALLS ASSESS DOC 0-1 FALLS W/OUT INJ PAST YR: ICD-10-PCS | Mod: 95,,, | Performed by: FAMILY MEDICINE

## 2020-05-06 PROCEDURE — 1159F PR MEDICATION LIST DOCUMENTED IN MEDICAL RECORD: ICD-10-PCS | Mod: 95,,, | Performed by: FAMILY MEDICINE

## 2020-05-06 PROCEDURE — 99441 PR PHYSICIAN TELEPHONE EVALUATION 5-10 MIN: CPT | Mod: 95,,, | Performed by: FAMILY MEDICINE

## 2020-05-06 PROCEDURE — 1159F MED LIST DOCD IN RCRD: CPT | Mod: 95,,, | Performed by: FAMILY MEDICINE

## 2020-05-06 PROCEDURE — 99441 PR PHYSICIAN TELEPHONE EVALUATION 5-10 MIN: ICD-10-PCS | Mod: 95,,, | Performed by: FAMILY MEDICINE

## 2020-05-06 RX ORDER — AMLODIPINE AND BENAZEPRIL HYDROCHLORIDE 10; 20 MG/1; MG/1
1 CAPSULE ORAL DAILY
Qty: 90 CAPSULE | Refills: 1 | Status: SHIPPED | OUTPATIENT
Start: 2020-05-06 | End: 2020-11-09 | Stop reason: SDUPTHER

## 2020-05-06 NOTE — PROGRESS NOTES
Established Patient - Audio Only Telehealth Visit     The patient location is: home  The chief complaint leading to consultation is: hypertension  Visit type: Virtual visit with audio only (telephone)  Total time spent with patient: 10 min       The reason for the audio only service rather than synchronous audio and video virtual visit was related to technical difficulties or patient preference/necessity.     Each patient to whom I provide medical services by telemedicine is:  (1) informed of the relationship between the physician and patient and the respective role of any other health care provider with respect to management of the patient; and (2) notified that they may decline to receive medical services by telemedicine and may withdraw from such care at any time. Patient verbally consented to receive this service via voice-only telephone call.       HPI:   The patient past medical history of hypertension hyperlipidemia.  Reports no issues with his medications.  Visit had to be postponed secondary to corona.  He did get his lab work however.  Lipid panel is now normal with his current dose of atorvastatin.  Reports that his blood pressures are well controlled.  Mild chronic kidney disease is still noted in his labs.  Reports that he is staying regularly active.  He has no falls.  He is up-to-date with immunizations.    No visits with results within 6 Month(s) from this visit.   Latest known visit with results is:   Office Visit on 09/26/2019   Component Date Value Ref Range Status    WBC 03/13/2020 5.8  3.8 - 10.8 Thousand/uL Final    RBC 03/13/2020 4.43  4.20 - 5.80 Million/uL Final    Hemoglobin 03/13/2020 12.9* 13.2 - 17.1 g/dL Final    Hematocrit 03/13/2020 37.9* 38.5 - 50.0 % Final    Mean Corpuscular Volume 03/13/2020 85.6  80.0 - 100.0 fL Final    Mean Corpuscular Hemoglobin 03/13/2020 29.1  27.0 - 33.0 pg Final    Mean Corpuscular Hemoglobin Conc 03/13/2020 34.0  32.0 - 36.0 g/dL Final    RDW  03/13/2020 12.8  11.0 - 15.0 % Final    Platelets 03/13/2020 252  140 - 400 Thousand/uL Final    MPV 03/13/2020 10.4  7.5 - 12.5 fL Final    Neutrophils Absolute 03/13/2020 3,312  1,500 - 7,800 cells/uL Final    Lymph # 03/13/2020 1,728  850 - 3,900 cells/uL Final    Mono # 03/13/2020 597  200 - 950 cells/uL Final    Eos # 03/13/2020 122  15 - 500 cells/uL Final    Baso # 03/13/2020 41  0 - 200 cells/uL Final    Neutrophils Relative 03/13/2020 57.1  % Final    Lymph% 03/13/2020 29.8  % Final    Mono% 03/13/2020 10.3  % Final    Eosinophil% 03/13/2020 2.1  % Final    Basophil% 03/13/2020 0.7  % Final    Glucose 03/13/2020 101* 65 - 99 mg/dL Final    BUN, Bld 03/13/2020 28* 7 - 25 mg/dL Final    Creatinine 03/13/2020 1.49* 0.70 - 1.25 mg/dL Final    eGFR if non African American 03/13/2020 48* > OR = 60 mL/min/1.73m2 Final    eGFR if African American 03/13/2020 56* > OR = 60 mL/min/1.73m2 Final    BUN/Creatinine Ratio 03/13/2020 19  6 - 22 (calc) Final    Sodium 03/13/2020 140  135 - 146 mmol/L Final    Potassium 03/13/2020 5.3  3.5 - 5.3 mmol/L Final    Chloride 03/13/2020 104  98 - 110 mmol/L Final    CO2 03/13/2020 26  20 - 32 mmol/L Final    Calcium 03/13/2020 9.6  8.6 - 10.3 mg/dL Final    Total Protein 03/13/2020 7.6  6.1 - 8.1 g/dL Final    Albumin 03/13/2020 4.6  3.6 - 5.1 g/dL Final    Globulin, Total 03/13/2020 3.0  1.9 - 3.7 g/dL (calc) Final    Albumin/Globulin Ratio 03/13/2020 1.5  1.0 - 2.5 (calc) Final    Total Bilirubin 03/13/2020 0.6  0.2 - 1.2 mg/dL Final    Alkaline Phosphatase 03/13/2020 64  35 - 144 U/L Final    AST 03/13/2020 22  10 - 35 U/L Final    ALT 03/13/2020 33  9 - 46 U/L Final    Cholesterol 03/13/2020 126  <200 mg/dL Final    HDL 03/13/2020 35* > OR = 40 mg/dL Final    Triglycerides 03/13/2020 80  <150 mg/dL Final    LDL Cholesterol 03/13/2020 75  mg/dL (calc) Final    Hdl/Cholesterol Ratio 03/13/2020 3.6  <5.0 (calc) Final    Non HDL Chol. (LDL+VLDL)  03/13/2020 91  <130 mg/dL (calc) Final    PROSTATE SPECIFIC ANTIGEN, SCR - Q* 03/13/2020 1.0  < OR = 4.0 ng/mL Final        Assessment and plan:    Diagnoses and all orders for this visit:    Mixed hyperlipidemia  Comments:  Controlled    Essential hypertension, benign  Comments:  Stable  Orders:  -     amlodipine-benazepril 10-20mg (LOTREL) 10-20 mg per capsule; Take 1 capsule by mouth once daily.    Stage 3 chronic kidney disease  Comments:  Continue to monitor  Orders:  -     Basic metabolic panel; Future  -     Microalbumin/creatinine urine ratio; Future  -     Basic metabolic panel  -     Microalbumin/creatinine urine ratio                            This service was not originating from a related E/M service provided within the previous 7 days nor will  to an E/M service or procedure within the next 24 hours or my soonest available appointment.  Prevailing standard of care was able to be met in this audio-only visit.

## 2020-05-20 ENCOUNTER — TELEPHONE (OUTPATIENT)
Dept: FAMILY MEDICINE | Facility: CLINIC | Age: 65
End: 2020-05-20

## 2020-05-20 NOTE — TELEPHONE ENCOUNTER
----- Message from Jaime Dejesus sent at 5/4/2020  8:54 AM CDT -----  Pt is needing to r/s his may 25 appt he will be out of town   And is needing a refill on amlob-benazepril   Pharm [facility] [place]   Pt [phone]

## 2020-11-02 ENCOUNTER — TELEPHONE (OUTPATIENT)
Dept: FAMILY MEDICINE | Facility: CLINIC | Age: 65
End: 2020-11-02

## 2020-11-03 LAB
ALBUMIN/CREAT UR: 23 MCG/MG CREAT
BUN SERPL-MCNC: 19 MG/DL (ref 7–25)
BUN/CREAT SERPL: NORMAL (CALC) (ref 6–22)
CALCIUM SERPL-MCNC: 9.6 MG/DL (ref 8.6–10.3)
CHLORIDE SERPL-SCNC: 106 MMOL/L (ref 98–110)
CO2 SERPL-SCNC: 28 MMOL/L (ref 20–32)
CREAT SERPL-MCNC: 1.18 MG/DL (ref 0.7–1.25)
CREAT UR-MCNC: 70 MG/DL (ref 20–320)
GFRSERPLBLD MDRD-ARVRAT: 63 ML/MIN/1.73M2
GLUCOSE SERPL-MCNC: 99 MG/DL (ref 65–99)
MICROALBUMIN UR-MCNC: 1.6 MG/DL
POTASSIUM SERPL-SCNC: 4.9 MMOL/L (ref 3.5–5.3)
SODIUM SERPL-SCNC: 143 MMOL/L (ref 135–146)

## 2020-11-09 ENCOUNTER — OFFICE VISIT (OUTPATIENT)
Dept: FAMILY MEDICINE | Facility: CLINIC | Age: 65
End: 2020-11-09
Payer: MEDICARE

## 2020-11-09 VITALS
HEIGHT: 67 IN | BODY MASS INDEX: 31.71 KG/M2 | SYSTOLIC BLOOD PRESSURE: 142 MMHG | DIASTOLIC BLOOD PRESSURE: 62 MMHG | TEMPERATURE: 98 F | WEIGHT: 202 LBS | HEART RATE: 69 BPM

## 2020-11-09 DIAGNOSIS — I10 ESSENTIAL HYPERTENSION, BENIGN: ICD-10-CM

## 2020-11-09 DIAGNOSIS — Z00.00 ANNUAL PHYSICAL EXAM: Primary | ICD-10-CM

## 2020-11-09 DIAGNOSIS — K63.5 POLYP OF SIGMOID COLON, UNSPECIFIED TYPE: ICD-10-CM

## 2020-11-09 DIAGNOSIS — E78.2 MIXED HYPERLIPIDEMIA: ICD-10-CM

## 2020-11-09 PROCEDURE — 3078F PR MOST RECENT DIASTOLIC BLOOD PRESSURE < 80 MM HG: ICD-10-PCS | Mod: S$GLB,,, | Performed by: FAMILY MEDICINE

## 2020-11-09 PROCEDURE — 3077F SYST BP >= 140 MM HG: CPT | Mod: S$GLB,,, | Performed by: FAMILY MEDICINE

## 2020-11-09 PROCEDURE — 3077F PR MOST RECENT SYSTOLIC BLOOD PRESSURE >= 140 MM HG: ICD-10-PCS | Mod: S$GLB,,, | Performed by: FAMILY MEDICINE

## 2020-11-09 PROCEDURE — 99397 PR PREVENTIVE VISIT,EST,65 & OVER: ICD-10-PCS | Mod: S$GLB,,, | Performed by: FAMILY MEDICINE

## 2020-11-09 PROCEDURE — 3078F DIAST BP <80 MM HG: CPT | Mod: S$GLB,,, | Performed by: FAMILY MEDICINE

## 2020-11-09 PROCEDURE — 99397 PER PM REEVAL EST PAT 65+ YR: CPT | Mod: S$GLB,,, | Performed by: FAMILY MEDICINE

## 2020-11-09 RX ORDER — AMLODIPINE AND BENAZEPRIL HYDROCHLORIDE 10; 20 MG/1; MG/1
1 CAPSULE ORAL DAILY
Qty: 90 CAPSULE | Refills: 1 | Status: SHIPPED | OUTPATIENT
Start: 2020-11-09 | End: 2021-05-10 | Stop reason: SDUPTHER

## 2020-11-09 RX ORDER — ATORVASTATIN CALCIUM 40 MG/1
40 TABLET, FILM COATED ORAL DAILY
Qty: 90 TABLET | Refills: 3 | Status: SHIPPED | OUTPATIENT
Start: 2020-11-09 | End: 2021-05-10 | Stop reason: SDUPTHER

## 2021-05-03 ENCOUNTER — TELEPHONE (OUTPATIENT)
Dept: FAMILY MEDICINE | Facility: CLINIC | Age: 66
End: 2021-05-03

## 2021-05-03 DIAGNOSIS — Z12.5 SCREENING FOR PROSTATE CANCER: ICD-10-CM

## 2021-05-03 DIAGNOSIS — I10 ESSENTIAL HYPERTENSION, BENIGN: Primary | ICD-10-CM

## 2021-05-03 DIAGNOSIS — E78.2 MIXED HYPERLIPIDEMIA: ICD-10-CM

## 2021-05-05 LAB
ALBUMIN SERPL-MCNC: 4.6 G/DL (ref 3.6–5.1)
ALBUMIN/CREAT UR: 9 MCG/MG CREAT
ALBUMIN/GLOB SERPL: 1.6 (CALC) (ref 1–2.5)
ALP SERPL-CCNC: 58 U/L (ref 35–144)
ALT SERPL-CCNC: 25 U/L (ref 9–46)
APPEARANCE UR: CLEAR
AST SERPL-CCNC: 21 U/L (ref 10–35)
BACTERIA #/AREA URNS HPF: NORMAL /HPF
BACTERIA UR CULT: NORMAL
BASOPHILS # BLD AUTO: 29 CELLS/UL (ref 0–200)
BASOPHILS NFR BLD AUTO: 0.5 %
BILIRUB SERPL-MCNC: 0.5 MG/DL (ref 0.2–1.2)
BILIRUB UR QL STRIP: NEGATIVE
BUN SERPL-MCNC: 17 MG/DL (ref 7–25)
BUN/CREAT SERPL: 13 (CALC) (ref 6–22)
CALCIUM SERPL-MCNC: 9.5 MG/DL (ref 8.6–10.3)
CHLORIDE SERPL-SCNC: 107 MMOL/L (ref 98–110)
CHOLEST SERPL-MCNC: 120 MG/DL
CHOLEST/HDLC SERPL: 3.1 (CALC)
CO2 SERPL-SCNC: 27 MMOL/L (ref 20–32)
COLOR UR: YELLOW
CREAT SERPL-MCNC: 1.27 MG/DL (ref 0.7–1.25)
CREAT UR-MCNC: 140 MG/DL (ref 20–320)
EOSINOPHIL # BLD AUTO: 133 CELLS/UL (ref 15–500)
EOSINOPHIL NFR BLD AUTO: 2.3 %
ERYTHROCYTE [DISTWIDTH] IN BLOOD BY AUTOMATED COUNT: 12.7 % (ref 11–15)
GLOBULIN SER CALC-MCNC: 2.9 G/DL (CALC) (ref 1.9–3.7)
GLUCOSE SERPL-MCNC: 101 MG/DL (ref 65–99)
GLUCOSE UR QL STRIP: NEGATIVE
HCT VFR BLD AUTO: 40.4 % (ref 38.5–50)
HDLC SERPL-MCNC: 39 MG/DL
HGB BLD-MCNC: 13.2 G/DL (ref 13.2–17.1)
HGB UR QL STRIP: NEGATIVE
HYALINE CASTS #/AREA URNS LPF: NORMAL /LPF
KETONES UR QL STRIP: NEGATIVE
LDLC SERPL CALC-MCNC: 66 MG/DL (CALC)
LEUKOCYTE ESTERASE UR QL STRIP: NEGATIVE
LYMPHOCYTES # BLD AUTO: 1723 CELLS/UL (ref 850–3900)
LYMPHOCYTES NFR BLD AUTO: 29.7 %
MCH RBC QN AUTO: 28 PG (ref 27–33)
MCHC RBC AUTO-ENTMCNC: 32.7 G/DL (ref 32–36)
MCV RBC AUTO: 85.6 FL (ref 80–100)
MICROALBUMIN UR-MCNC: 1.2 MG/DL
MONOCYTES # BLD AUTO: 597 CELLS/UL (ref 200–950)
MONOCYTES NFR BLD AUTO: 10.3 %
NEUTROPHILS # BLD AUTO: 3318 CELLS/UL (ref 1500–7800)
NEUTROPHILS NFR BLD AUTO: 57.2 %
NITRITE UR QL STRIP: NEGATIVE
NONHDLC SERPL-MCNC: 81 MG/DL (CALC)
PH UR STRIP: NORMAL [PH] (ref 5–8)
PLATELET # BLD AUTO: 221 THOUSAND/UL (ref 140–400)
PMV BLD REES-ECKER: 10.2 FL (ref 7.5–12.5)
POTASSIUM SERPL-SCNC: 4.8 MMOL/L (ref 3.5–5.3)
PROT SERPL-MCNC: 7.5 G/DL (ref 6.1–8.1)
PROT UR QL STRIP: NEGATIVE
PSA SERPL-MCNC: 1 NG/ML
RBC # BLD AUTO: 4.72 MILLION/UL (ref 4.2–5.8)
RBC #/AREA URNS HPF: NORMAL /HPF
SODIUM SERPL-SCNC: 144 MMOL/L (ref 135–146)
SP GR UR STRIP: 1.02 (ref 1–1.03)
SQUAMOUS #/AREA URNS HPF: NORMAL /HPF
TRIGL SERPL-MCNC: 72 MG/DL
TSH SERPL-ACNC: 2.66 MIU/L (ref 0.4–4.5)
WBC # BLD AUTO: 5.8 THOUSAND/UL (ref 3.8–10.8)
WBC #/AREA URNS HPF: NORMAL /HPF

## 2021-05-10 ENCOUNTER — OFFICE VISIT (OUTPATIENT)
Dept: FAMILY MEDICINE | Facility: CLINIC | Age: 66
End: 2021-05-10
Payer: MEDICARE

## 2021-05-10 VITALS
BODY MASS INDEX: 31.86 KG/M2 | SYSTOLIC BLOOD PRESSURE: 156 MMHG | DIASTOLIC BLOOD PRESSURE: 78 MMHG | HEIGHT: 67 IN | HEART RATE: 77 BPM | WEIGHT: 203 LBS

## 2021-05-10 DIAGNOSIS — N18.31 STAGE 3A CHRONIC KIDNEY DISEASE: ICD-10-CM

## 2021-05-10 DIAGNOSIS — I10 ESSENTIAL HYPERTENSION, BENIGN: Primary | ICD-10-CM

## 2021-05-10 DIAGNOSIS — K63.5 POLYP OF SIGMOID COLON, UNSPECIFIED TYPE: ICD-10-CM

## 2021-05-10 DIAGNOSIS — E78.2 MIXED HYPERLIPIDEMIA: ICD-10-CM

## 2021-05-10 PROCEDURE — 3288F PR FALLS RISK ASSESSMENT DOCUMENTED: ICD-10-PCS | Mod: S$GLB,,, | Performed by: FAMILY MEDICINE

## 2021-05-10 PROCEDURE — 99214 PR OFFICE/OUTPT VISIT, EST, LEVL IV, 30-39 MIN: ICD-10-PCS | Mod: S$GLB,,, | Performed by: FAMILY MEDICINE

## 2021-05-10 PROCEDURE — 1159F PR MEDICATION LIST DOCUMENTED IN MEDICAL RECORD: ICD-10-PCS | Mod: S$GLB,,, | Performed by: FAMILY MEDICINE

## 2021-05-10 PROCEDURE — 99214 OFFICE O/P EST MOD 30 MIN: CPT | Mod: S$GLB,,, | Performed by: FAMILY MEDICINE

## 2021-05-10 PROCEDURE — 1101F PT FALLS ASSESS-DOCD LE1/YR: CPT | Mod: S$GLB,,, | Performed by: FAMILY MEDICINE

## 2021-05-10 PROCEDURE — 3077F PR MOST RECENT SYSTOLIC BLOOD PRESSURE >= 140 MM HG: ICD-10-PCS | Mod: S$GLB,,, | Performed by: FAMILY MEDICINE

## 2021-05-10 PROCEDURE — 1159F MED LIST DOCD IN RCRD: CPT | Mod: S$GLB,,, | Performed by: FAMILY MEDICINE

## 2021-05-10 PROCEDURE — 3077F SYST BP >= 140 MM HG: CPT | Mod: S$GLB,,, | Performed by: FAMILY MEDICINE

## 2021-05-10 PROCEDURE — 3008F BODY MASS INDEX DOCD: CPT | Mod: S$GLB,,, | Performed by: FAMILY MEDICINE

## 2021-05-10 PROCEDURE — 3078F DIAST BP <80 MM HG: CPT | Mod: S$GLB,,, | Performed by: FAMILY MEDICINE

## 2021-05-10 PROCEDURE — 3288F FALL RISK ASSESSMENT DOCD: CPT | Mod: S$GLB,,, | Performed by: FAMILY MEDICINE

## 2021-05-10 PROCEDURE — 3008F PR BODY MASS INDEX (BMI) DOCUMENTED: ICD-10-PCS | Mod: S$GLB,,, | Performed by: FAMILY MEDICINE

## 2021-05-10 PROCEDURE — 3078F PR MOST RECENT DIASTOLIC BLOOD PRESSURE < 80 MM HG: ICD-10-PCS | Mod: S$GLB,,, | Performed by: FAMILY MEDICINE

## 2021-05-10 PROCEDURE — 1101F PR PT FALLS ASSESS DOC 0-1 FALLS W/OUT INJ PAST YR: ICD-10-PCS | Mod: S$GLB,,, | Performed by: FAMILY MEDICINE

## 2021-05-10 RX ORDER — ATORVASTATIN CALCIUM 40 MG/1
40 TABLET, FILM COATED ORAL DAILY
Qty: 90 TABLET | Refills: 3 | Status: SHIPPED | OUTPATIENT
Start: 2021-05-10 | End: 2022-05-09 | Stop reason: SDUPTHER

## 2021-05-10 RX ORDER — AMLODIPINE AND BENAZEPRIL HYDROCHLORIDE 10; 20 MG/1; MG/1
1 CAPSULE ORAL DAILY
Qty: 90 CAPSULE | Refills: 3 | Status: SHIPPED | OUTPATIENT
Start: 2021-05-10 | End: 2022-05-09 | Stop reason: SDUPTHER

## 2021-10-29 NOTE — PROGRESS NOTES
SUBJECTIVE:   HPI: Aleksandar Knight Sr.  is a 65 y.o. male who presents for annual physical .   6M Check Up and declined flu vaccine    Patient past medical history of hypertension hyperlipidemia presents for his annual exam.  Physical should of been performed earlier this year but was halted secondary to COVID-19 outbreak.  He has been keeping himself healthy by avoiding exposures.  Some weight gain has been noted as a result of that.  He reports compliance with medications.  He declines flu shot.  He feels well is independent of his activities of daily living.  He had a colonoscopy in December of 2019 that showed 1 sigmoid polyp and internal and external hemorrhoids.  He does report some stool issues since the procedure and now uses a stool softener which helps.  Labs this year demonstrated normal PSA and normal lipid panel.  Recent CMP is within normal limits as well.  Uses over-the-counter reading glasses.         No visits with results within 6 Month(s) from this visit.   Latest known visit with results is:   Office Visit on 05/06/2020   Component Date Value Ref Range Status    Glucose 11/02/2020 99  65 - 99 mg/dL Final    BUN 11/02/2020 19  7 - 25 mg/dL Final    Creatinine 11/02/2020 1.18  0.70 - 1.25 mg/dL Final    eGFR if non African American 11/02/2020 63  > OR = 60 mL/min/1.73m2 Final    eGFR if African American 11/02/2020 74  > OR = 60 mL/min/1.73m2 Final    BUN/Creatinine Ratio 11/02/2020 NOT APPLICABLE  6 - 22 (calc) Final    Sodium 11/02/2020 143  135 - 146 mmol/L Final    Potassium 11/02/2020 4.9  3.5 - 5.3 mmol/L Final    Chloride 11/02/2020 106  98 - 110 mmol/L Final    CO2 11/02/2020 28  20 - 32 mmol/L Final    Calcium 11/02/2020 9.6  8.6 - 10.3 mg/dL Final    Creatinine, Urine 11/02/2020 70  20 - 320 mg/dL Final    Microalb, Ur 11/02/2020 1.6  See Note: mg/dL Final    Microalb/Creat Ratio 11/02/2020 23  <30 mcg/mg creat Final     (Not in a hospital admission)    Review of patient's    Reason for Disposition  • Requesting regular office appointment    Protocols used: INFORMATION ONLY CALL-A-AH     allergies indicates:  No Known Allergies  Current Outpatient Medications on File Prior to Visit   Medication Sig Dispense Refill    [DISCONTINUED] amlodipine-benazepril 10-20mg (LOTREL) 10-20 mg per capsule Take 1 capsule by mouth once daily. 90 capsule 1    [DISCONTINUED] atorvastatin (LIPITOR) 40 MG tablet Take 1 tablet (40 mg total) by mouth once daily. For cholesterol 90 tablet 3     No current facility-administered medications on file prior to visit.      Past Medical History:   Diagnosis Date    Hyperlipidemia     Hypertension      Past Surgical History:   Procedure Laterality Date    COLONOSCOPY N/A 11/20/2019    Procedure: COLONOSCOPY;  Surgeon: Nabeel Hart MD;  Location: [facility];  Service: Endoscopy;  Laterality: N/A;     Family History   Problem Relation Age of Onset    Cancer Father         unkn type     Social History     Tobacco Use    Smoking status: Never Smoker    Smokeless tobacco: Never Used   Substance Use Topics    Alcohol use: Never     Frequency: Never    Drug use: Never      Health Maintenance Topics with due status: Not Due       Topic Last Completion Date    Colorectal Cancer Screening 11/20/2019    Lipid Panel 03/13/2020     Immunization History   Administered Date(s) Administered    Pneumococcal Conjugate - 13 Valent 08/15/2018    Pneumococcal Polysaccharide - 23 Valent 09/26/2019       Review of Systems   Constitutional: Negative for fatigue, fever and unexpected weight change.   HENT: Negative for congestion, postnasal drip, rhinorrhea and sore throat.    Eyes: Negative for photophobia, pain and visual disturbance.   Respiratory: Negative for cough, shortness of breath and wheezing.    Cardiovascular: Negative for chest pain and palpitations.   Gastrointestinal: Negative for constipation, diarrhea, nausea and vomiting.   Genitourinary: Negative for difficulty urinating, dysuria, frequency, hematuria and urgency.   Musculoskeletal: Negative for arthralgias and  "myalgias.   Skin: Negative for rash.   Neurological: Negative for dizziness and headaches.   Psychiatric/Behavioral: Negative for sleep disturbance.      OBJECTIVE:      Vitals:    11/09/20 0829 11/09/20 0909 11/09/20 0920   BP: (!) 190/74 (!) 152/76 (!) 142/62   Pulse: 69     Temp: 98.4 °F (36.9 °C)     Weight: 91.6 kg (202 lb)     Height: 5' 7" (1.702 m)       Physical Exam  Vitals signs reviewed.   Constitutional:       General: He is not in acute distress.     Appearance: He is well-developed.   HENT:      Head: Normocephalic and atraumatic.      Right Ear: External ear normal.      Left Ear: External ear normal.      Nose: Nose normal.      Mouth/Throat:      Mouth: Mucous membranes are moist.   Eyes:      Conjunctiva/sclera: Conjunctivae normal.      Pupils: Pupils are equal, round, and reactive to light.   Neck:      Musculoskeletal: Normal range of motion and neck supple.      Thyroid: No thyromegaly.   Cardiovascular:      Rate and Rhythm: Normal rate and regular rhythm.      Pulses: Normal pulses.      Heart sounds: No murmur.   Pulmonary:      Effort: Pulmonary effort is normal.      Breath sounds: Normal breath sounds.   Abdominal:      General: Bowel sounds are normal.      Palpations: Abdomen is soft. There is no mass.      Tenderness: There is no abdominal tenderness.   Musculoskeletal: Normal range of motion.         General: No tenderness.   Skin:     General: Skin is warm and dry.      Findings: No rash.   Neurological:      General: No focal deficit present.      Mental Status: He is alert and oriented to person, place, and time.      Cranial Nerves: No cranial nerve deficit.   Psychiatric:         Mood and Affect: Mood normal.         Behavior: Behavior normal.        Assessment:       1. Annual physical exam    2. Essential hypertension, benign    3. Mixed hyperlipidemia    4. Polyp of sigmoid colon, unspecified type        Plan:       Annual physical exam    Essential hypertension, " benign  Comments:  Patient always with aspect of white coat hypertension  Orders:  -     amlodipine-benazepril 10-20mg (LOTREL) 10-20 mg per capsule; Take 1 capsule by mouth once daily.  Dispense: 90 capsule; Refill: 1    Mixed hyperlipidemia  Comments:  Doing well on current does  Orders:  -     atorvastatin (LIPITOR) 40 MG tablet; Take 1 tablet (40 mg total) by mouth once daily. For cholesterol  Dispense: 90 tablet; Refill: 3    Polyp of sigmoid colon, unspecified type  Comments:  Repeat colonoscopy 2024        Counseled on age and gender appropriate medical preventative services, including cancer screenings, immunizations, overall nutritional health, need for a consistent exercise regimen and an overall push towards maintaining a vigorous and active lifestyle.      Follow up in about 6 months (around 5/9/2021) for HTN.

## 2022-04-25 ENCOUNTER — TELEPHONE (OUTPATIENT)
Dept: FAMILY MEDICINE | Facility: CLINIC | Age: 67
End: 2022-04-25

## 2022-04-25 DIAGNOSIS — Z00.00 ANNUAL PHYSICAL EXAM: ICD-10-CM

## 2022-04-25 DIAGNOSIS — Z12.5 SCREENING FOR PROSTATE CANCER: ICD-10-CM

## 2022-04-25 DIAGNOSIS — E78.2 MIXED HYPERLIPIDEMIA: Primary | ICD-10-CM

## 2022-04-25 DIAGNOSIS — N18.31 STAGE 3A CHRONIC KIDNEY DISEASE: ICD-10-CM

## 2022-04-25 DIAGNOSIS — I10 ESSENTIAL HYPERTENSION, BENIGN: ICD-10-CM

## 2022-04-25 DIAGNOSIS — Z11.59 ENCOUNTER FOR HEPATITIS C SCREENING TEST FOR LOW RISK PATIENT: ICD-10-CM

## 2022-04-25 NOTE — TELEPHONE ENCOUNTER
----- Message from Breanna Young sent at 4/25/2022  9:46 AM CDT -----  Patient called and would like to know if he need to have lab work done before his next appointment please give him a call at [phone]

## 2022-05-03 LAB
ALBUMIN SERPL-MCNC: 4.4 G/DL (ref 3.6–5.1)
ALBUMIN/CREAT UR: 21 MCG/MG CREAT
ALBUMIN/GLOB SERPL: 1.5 (CALC) (ref 1–2.5)
ALP SERPL-CCNC: 54 U/L (ref 35–144)
ALT SERPL-CCNC: 20 U/L (ref 9–46)
AST SERPL-CCNC: 20 U/L (ref 10–35)
BILIRUB SERPL-MCNC: 0.6 MG/DL (ref 0.2–1.2)
BUN SERPL-MCNC: 20 MG/DL (ref 7–25)
BUN/CREAT SERPL: 15 (CALC) (ref 6–22)
CALCIUM SERPL-MCNC: 9.4 MG/DL (ref 8.6–10.3)
CHLORIDE SERPL-SCNC: 107 MMOL/L (ref 98–110)
CHOLEST SERPL-MCNC: 137 MG/DL
CHOLEST/HDLC SERPL: 3.7 (CALC)
CO2 SERPL-SCNC: 27 MMOL/L (ref 20–32)
CREAT SERPL-MCNC: 1.32 MG/DL (ref 0.7–1.25)
CREAT UR-MCNC: 134 MG/DL (ref 20–320)
GLOBULIN SER CALC-MCNC: 2.9 G/DL (CALC) (ref 1.9–3.7)
GLUCOSE SERPL-MCNC: 86 MG/DL (ref 65–99)
HCV AB S/CO SERPL IA: 0.01
HCV AB SERPL QL IA: NORMAL
HDLC SERPL-MCNC: 37 MG/DL
LDLC SERPL CALC-MCNC: 80 MG/DL (CALC)
MICROALBUMIN UR-MCNC: 2.8 MG/DL
NONHDLC SERPL-MCNC: 100 MG/DL (CALC)
POTASSIUM SERPL-SCNC: 4.4 MMOL/L (ref 3.5–5.3)
PROT SERPL-MCNC: 7.3 G/DL (ref 6.1–8.1)
PSA SERPL-MCNC: 0.96 NG/ML
SODIUM SERPL-SCNC: 141 MMOL/L (ref 135–146)
TRIGL SERPL-MCNC: 102 MG/DL

## 2022-05-10 ENCOUNTER — OFFICE VISIT (OUTPATIENT)
Dept: FAMILY MEDICINE | Facility: CLINIC | Age: 67
End: 2022-05-10
Payer: MEDICARE

## 2022-05-10 VITALS
SYSTOLIC BLOOD PRESSURE: 162 MMHG | DIASTOLIC BLOOD PRESSURE: 60 MMHG | HEART RATE: 72 BPM | HEIGHT: 67 IN | BODY MASS INDEX: 32.02 KG/M2 | WEIGHT: 204 LBS

## 2022-05-10 DIAGNOSIS — R35.1 BPH ASSOCIATED WITH NOCTURIA: ICD-10-CM

## 2022-05-10 DIAGNOSIS — N40.1 BPH ASSOCIATED WITH NOCTURIA: ICD-10-CM

## 2022-05-10 DIAGNOSIS — E78.2 MIXED HYPERLIPIDEMIA: ICD-10-CM

## 2022-05-10 DIAGNOSIS — I10 ESSENTIAL HYPERTENSION, BENIGN: Primary | ICD-10-CM

## 2022-05-10 DIAGNOSIS — M24.541 CONTRACTURE OF RIGHT HAND: ICD-10-CM

## 2022-05-10 DIAGNOSIS — N18.31 STAGE 3A CHRONIC KIDNEY DISEASE: ICD-10-CM

## 2022-05-10 PROCEDURE — 3077F SYST BP >= 140 MM HG: CPT | Mod: CPTII,S$GLB,, | Performed by: FAMILY MEDICINE

## 2022-05-10 PROCEDURE — 3008F PR BODY MASS INDEX (BMI) DOCUMENTED: ICD-10-PCS | Mod: CPTII,S$GLB,, | Performed by: FAMILY MEDICINE

## 2022-05-10 PROCEDURE — 3066F NEPHROPATHY DOC TX: CPT | Mod: CPTII,S$GLB,, | Performed by: FAMILY MEDICINE

## 2022-05-10 PROCEDURE — 99214 PR OFFICE/OUTPT VISIT, EST, LEVL IV, 30-39 MIN: ICD-10-PCS | Mod: S$GLB,,, | Performed by: FAMILY MEDICINE

## 2022-05-10 PROCEDURE — 99214 OFFICE O/P EST MOD 30 MIN: CPT | Mod: S$GLB,,, | Performed by: FAMILY MEDICINE

## 2022-05-10 PROCEDURE — 3078F PR MOST RECENT DIASTOLIC BLOOD PRESSURE < 80 MM HG: ICD-10-PCS | Mod: CPTII,S$GLB,, | Performed by: FAMILY MEDICINE

## 2022-05-10 PROCEDURE — 1159F MED LIST DOCD IN RCRD: CPT | Mod: CPTII,S$GLB,, | Performed by: FAMILY MEDICINE

## 2022-05-10 PROCEDURE — 3066F PR DOCUMENTATION OF TREATMENT FOR NEPHROPATHY: ICD-10-PCS | Mod: CPTII,S$GLB,, | Performed by: FAMILY MEDICINE

## 2022-05-10 PROCEDURE — 3078F DIAST BP <80 MM HG: CPT | Mod: CPTII,S$GLB,, | Performed by: FAMILY MEDICINE

## 2022-05-10 PROCEDURE — 3008F BODY MASS INDEX DOCD: CPT | Mod: CPTII,S$GLB,, | Performed by: FAMILY MEDICINE

## 2022-05-10 PROCEDURE — 3061F PR NEG MICROALBUMINURIA RESULT DOCUMENTED/REVIEW: ICD-10-PCS | Mod: CPTII,S$GLB,, | Performed by: FAMILY MEDICINE

## 2022-05-10 PROCEDURE — 3077F PR MOST RECENT SYSTOLIC BLOOD PRESSURE >= 140 MM HG: ICD-10-PCS | Mod: CPTII,S$GLB,, | Performed by: FAMILY MEDICINE

## 2022-05-10 PROCEDURE — 1159F PR MEDICATION LIST DOCUMENTED IN MEDICAL RECORD: ICD-10-PCS | Mod: CPTII,S$GLB,, | Performed by: FAMILY MEDICINE

## 2022-05-10 PROCEDURE — 3061F NEG MICROALBUMINURIA REV: CPT | Mod: CPTII,S$GLB,, | Performed by: FAMILY MEDICINE

## 2022-05-10 RX ORDER — AMLODIPINE AND BENAZEPRIL HYDROCHLORIDE 10; 20 MG/1; MG/1
1 CAPSULE ORAL DAILY
Qty: 90 CAPSULE | Refills: 3 | Status: SHIPPED | OUTPATIENT
Start: 2022-05-10 | End: 2023-05-15 | Stop reason: SDUPTHER

## 2022-05-10 RX ORDER — ATORVASTATIN CALCIUM 40 MG/1
40 TABLET, FILM COATED ORAL DAILY
Qty: 90 TABLET | Refills: 3 | Status: SHIPPED | OUTPATIENT
Start: 2022-05-10 | End: 2023-05-15 | Stop reason: SDUPTHER

## 2022-05-10 RX ORDER — TAMSULOSIN HYDROCHLORIDE 0.4 MG/1
0.4 CAPSULE ORAL DAILY
Qty: 90 CAPSULE | Refills: 1 | Status: SHIPPED | OUTPATIENT
Start: 2022-05-10 | End: 2023-05-15

## 2022-05-10 NOTE — PROGRESS NOTES
SUBJECTIVE:   HPI: Aleksandar Knight Sr.  is a 66 y.o. male who presents for annual physical .   Annual Exam (HTN, went over meds verbally// SW)    Patient reports fall off a ladder onto his back in January. He has recovered   Had issues with tendon scarring to right hand that has progressed over the last 3 months.  Patient reports no pain but it is significantly limiting the use of his dominant hand  Annual labs were performed ER all within normal limits.  Patient with stable chronic kidney disease.  reports has been out of blood pressure medications for the past week.      Telephone on 04/25/2022   Component Date Value Ref Range Status    Glucose 05/02/2022 86  65 - 99 mg/dL Final    BUN 05/02/2022 20  7 - 25 mg/dL Final    Creatinine 05/02/2022 1.32 (A) 0.70 - 1.25 mg/dL Final    eGFR if non African American 05/02/2022 55 (A) > OR = 60 mL/min/1.73m2 Final    eGFR if  05/02/2022 64  > OR = 60 mL/min/1.73m2 Final    BUN/Creatinine Ratio 05/02/2022 15  6 - 22 (calc) Final    Sodium 05/02/2022 141  135 - 146 mmol/L Final    Potassium 05/02/2022 4.4  3.5 - 5.3 mmol/L Final    Chloride 05/02/2022 107  98 - 110 mmol/L Final    CO2 05/02/2022 27  20 - 32 mmol/L Final    Calcium 05/02/2022 9.4  8.6 - 10.3 mg/dL Final    Total Protein 05/02/2022 7.3  6.1 - 8.1 g/dL Final    Albumin 05/02/2022 4.4  3.6 - 5.1 g/dL Final    Globulin, Total 05/02/2022 2.9  1.9 - 3.7 g/dL (calc) Final    Albumin/Globulin Ratio 05/02/2022 1.5  1.0 - 2.5 (calc) Final    Total Bilirubin 05/02/2022 0.6  0.2 - 1.2 mg/dL Final    Alkaline Phosphatase 05/02/2022 54  35 - 144 U/L Final    AST 05/02/2022 20  10 - 35 U/L Final    ALT 05/02/2022 20  9 - 46 U/L Final    Cholesterol 05/02/2022 137  <200 mg/dL Final    HDL 05/02/2022 37 (A) > OR = 40 mg/dL Final    Triglycerides 05/02/2022 102  <150 mg/dL Final    LDL Cholesterol 05/02/2022 80  mg/dL (calc) Final    HDL/Cholesterol Ratio 05/02/2022 3.7  <5.0 (calc)  Final    Non HDL Chol. (LDL+VLDL) 05/02/2022 100  <130 mg/dL (calc) Final    PROSTATE SPECIFIC ANTIGEN, SCR - Q* 05/02/2022 0.96  < OR = 4.00 ng/mL Final    Creatinine, Urine 05/02/2022 134  20 - 320 mg/dL Final    Microalb, Ur 05/02/2022 2.8  See Note: mg/dL Final    Microalb/Creat Ratio 05/02/2022 21  <30 mcg/mg creat Final    Hepatitis C Ab 05/02/2022 NON-REACTIVE  NON-REACTIVE Final    Signal/Cutoff 05/02/2022 0.01  <1.00 Final     (Not in a hospital admission)    Review of patient's allergies indicates:  No Known Allergies  No current outpatient medications on file prior to visit.     No current facility-administered medications on file prior to visit.     Past Medical History:   Diagnosis Date    Hyperlipidemia     Hypertension      Past Surgical History:   Procedure Laterality Date    COLONOSCOPY N/A 11/20/2019    Procedure: COLONOSCOPY;  Surgeon: Nabeel Hart MD;  Location: [facility];  Service: Endoscopy;  Laterality: N/A;     Family History   Problem Relation Age of Onset    Cancer Father         unkn type     Social History     Tobacco Use    Smoking status: Never Smoker    Smokeless tobacco: Never Used   Substance Use Topics    Alcohol use: Never    Drug use: Never      Health Maintenance Topics with due status: Not Due       Topic Last Completion Date    Colorectal Cancer Screening 11/20/2019    Lipid Panel 05/02/2022    Influenza Vaccine Not Due     Immunization History   Administered Date(s) Administered    Pneumococcal Conjugate - 13 Valent 08/15/2018    Pneumococcal Polysaccharide - 23 Valent 09/26/2019       Review of Systems   Constitutional: Negative for fatigue, fever and unexpected weight change.   HENT: Negative for congestion, postnasal drip, rhinorrhea and sore throat.    Eyes: Negative for photophobia, pain and visual disturbance.   Respiratory: Negative for cough, shortness of breath and wheezing.    Cardiovascular: Negative for chest pain and palpitations.  "  Gastrointestinal: Negative for constipation, diarrhea, nausea and vomiting.   Genitourinary: Negative for difficulty urinating, dysuria, frequency, hematuria and urgency.   Musculoskeletal: Negative for arthralgias and myalgias.   Skin: Negative for rash.   Neurological: Negative for dizziness and headaches.   Psychiatric/Behavioral: Negative for sleep disturbance.      OBJECTIVE:      Vitals:    05/10/22 0802 05/10/22 0804   BP: (!) 160/60  Comment: Has been out of BP meds x1 week (!) 162/60   BP Location: Left arm Right arm   Pulse: 72    Weight: 92.5 kg (204 lb)    Height: 5' 7" (1.702 m)      Physical Exam  Vitals reviewed.   Constitutional:       General: He is not in acute distress.     Appearance: He is well-developed.   HENT:      Head: Normocephalic and atraumatic.      Right Ear: External ear normal.      Left Ear: External ear normal.      Nose: Nose normal.      Mouth/Throat:      Mouth: Mucous membranes are moist.   Eyes:      Conjunctiva/sclera: Conjunctivae normal.      Pupils: Pupils are equal, round, and reactive to light.   Neck:      Thyroid: No thyromegaly.   Cardiovascular:      Rate and Rhythm: Normal rate and regular rhythm.      Pulses: Normal pulses.      Heart sounds: No murmur heard.  Pulmonary:      Effort: Pulmonary effort is normal.      Breath sounds: Normal breath sounds.   Abdominal:      General: Bowel sounds are normal.      Palpations: Abdomen is soft. There is no mass.      Tenderness: There is no abdominal tenderness.   Musculoskeletal:         General: Deformity present. No tenderness. Normal range of motion.      Right hand: Deformity present.      Cervical back: Normal range of motion and neck supple.      Comments: Thick contracture from palm to 5th digit. Finger in flexion   Skin:     General: Skin is warm and dry.      Findings: No rash.   Neurological:      General: No focal deficit present.      Mental Status: He is alert and oriented to person, place, and time.      " Cranial Nerves: No cranial nerve deficit.   Psychiatric:         Mood and Affect: Mood normal.         Behavior: Behavior normal.        Assessment:       1. Essential hypertension, benign    2. Mixed hyperlipidemia    3. Contracture of right hand    4. Stage 3a chronic kidney disease    5. BPH associated with nocturia        Plan:       Essential hypertension, benign  Comments:  Patient always with aspect of white coat hypertension  Orders:  -     amlodipine-benazepril 10-20mg (LOTREL) 10-20 mg per capsule; Take 1 capsule by mouth once daily.  Dispense: 90 capsule; Refill: 3    Mixed hyperlipidemia  Comments:  Doing well on current does  Orders:  -     atorvastatin (LIPITOR) 40 MG tablet; Take 1 tablet (40 mg total) by mouth once daily. For cholesterol  Dispense: 90 tablet; Refill: 3    Contracture of right hand  -     Ambulatory referral/consult to Orthopedics; Future; Expected date: 05/17/2022    Stage 3a chronic kidney disease    BPH associated with nocturia  -     tamsulosin (FLOMAX) 0.4 mg Cap; Take 1 capsule (0.4 mg total) by mouth once daily.  Dispense: 90 capsule; Refill: 1        Counseled on age and gender appropriate medical preventative services, including cancer screenings, immunizations, overall nutritional health, need for a consistent exercise regimen and an overall push towards maintaining a vigorous and active lifestyle.      Follow up in about 1 year (around 5/10/2023) for Annual Physical, HTN.

## 2022-05-19 ENCOUNTER — OFFICE VISIT (OUTPATIENT)
Dept: ORTHOPEDICS | Facility: CLINIC | Age: 67
End: 2022-05-19
Payer: MEDICARE

## 2022-05-19 VITALS — HEIGHT: 67 IN | WEIGHT: 204 LBS | BODY MASS INDEX: 32.02 KG/M2

## 2022-05-19 DIAGNOSIS — M72.0 DUPUYTREN'S CONTRACTURE OF RIGHT HAND: Primary | ICD-10-CM

## 2022-05-19 PROCEDURE — 1101F PT FALLS ASSESS-DOCD LE1/YR: CPT | Mod: CPTII,S$GLB,, | Performed by: ORTHOPAEDIC SURGERY

## 2022-05-19 PROCEDURE — 1160F PR REVIEW ALL MEDS BY PRESCRIBER/CLIN PHARMACIST DOCUMENTED: ICD-10-PCS | Mod: CPTII,S$GLB,, | Performed by: ORTHOPAEDIC SURGERY

## 2022-05-19 PROCEDURE — 1160F RVW MEDS BY RX/DR IN RCRD: CPT | Mod: CPTII,S$GLB,, | Performed by: ORTHOPAEDIC SURGERY

## 2022-05-19 PROCEDURE — 1159F PR MEDICATION LIST DOCUMENTED IN MEDICAL RECORD: ICD-10-PCS | Mod: CPTII,S$GLB,, | Performed by: ORTHOPAEDIC SURGERY

## 2022-05-19 PROCEDURE — 3066F NEPHROPATHY DOC TX: CPT | Mod: CPTII,S$GLB,, | Performed by: ORTHOPAEDIC SURGERY

## 2022-05-19 PROCEDURE — 4010F PR ACE/ARB THEARPY RXD/TAKEN: ICD-10-PCS | Mod: CPTII,S$GLB,, | Performed by: ORTHOPAEDIC SURGERY

## 2022-05-19 PROCEDURE — 1159F MED LIST DOCD IN RCRD: CPT | Mod: CPTII,S$GLB,, | Performed by: ORTHOPAEDIC SURGERY

## 2022-05-19 PROCEDURE — 3066F PR DOCUMENTATION OF TREATMENT FOR NEPHROPATHY: ICD-10-PCS | Mod: CPTII,S$GLB,, | Performed by: ORTHOPAEDIC SURGERY

## 2022-05-19 PROCEDURE — 3061F NEG MICROALBUMINURIA REV: CPT | Mod: CPTII,S$GLB,, | Performed by: ORTHOPAEDIC SURGERY

## 2022-05-19 PROCEDURE — 99203 PR OFFICE/OUTPT VISIT, NEW, LEVL III, 30-44 MIN: ICD-10-PCS | Mod: S$GLB,,, | Performed by: ORTHOPAEDIC SURGERY

## 2022-05-19 PROCEDURE — 1101F PR PT FALLS ASSESS DOC 0-1 FALLS W/OUT INJ PAST YR: ICD-10-PCS | Mod: CPTII,S$GLB,, | Performed by: ORTHOPAEDIC SURGERY

## 2022-05-19 PROCEDURE — 1125F AMNT PAIN NOTED PAIN PRSNT: CPT | Mod: CPTII,S$GLB,, | Performed by: ORTHOPAEDIC SURGERY

## 2022-05-19 PROCEDURE — 4010F ACE/ARB THERAPY RXD/TAKEN: CPT | Mod: CPTII,S$GLB,, | Performed by: ORTHOPAEDIC SURGERY

## 2022-05-19 PROCEDURE — 3008F BODY MASS INDEX DOCD: CPT | Mod: CPTII,S$GLB,, | Performed by: ORTHOPAEDIC SURGERY

## 2022-05-19 PROCEDURE — 3061F PR NEG MICROALBUMINURIA RESULT DOCUMENTED/REVIEW: ICD-10-PCS | Mod: CPTII,S$GLB,, | Performed by: ORTHOPAEDIC SURGERY

## 2022-05-19 PROCEDURE — 3288F PR FALLS RISK ASSESSMENT DOCUMENTED: ICD-10-PCS | Mod: CPTII,S$GLB,, | Performed by: ORTHOPAEDIC SURGERY

## 2022-05-19 PROCEDURE — 99203 OFFICE O/P NEW LOW 30 MIN: CPT | Mod: S$GLB,,, | Performed by: ORTHOPAEDIC SURGERY

## 2022-05-19 PROCEDURE — 1125F PR PAIN SEVERITY QUANTIFIED, PAIN PRESENT: ICD-10-PCS | Mod: CPTII,S$GLB,, | Performed by: ORTHOPAEDIC SURGERY

## 2022-05-19 PROCEDURE — 3008F PR BODY MASS INDEX (BMI) DOCUMENTED: ICD-10-PCS | Mod: CPTII,S$GLB,, | Performed by: ORTHOPAEDIC SURGERY

## 2022-05-19 PROCEDURE — 3288F FALL RISK ASSESSMENT DOCD: CPT | Mod: CPTII,S$GLB,, | Performed by: ORTHOPAEDIC SURGERY

## 2022-05-19 NOTE — PROGRESS NOTES
[facility]    Subjective:     Chief Complaint:   Chief Complaint   Patient presents with    Right Hand - Pain     Patient is having right hand pain, this started about six months ago, numbness in on the side of hand.       Past Medical History:   Diagnosis Date    Hyperlipidemia     Hypertension        Past Surgical History:   Procedure Laterality Date    COLONOSCOPY N/A 11/20/2019    Procedure: COLONOSCOPY;  Surgeon: Nabeel Hart MD;  Location: [facility];  Service: Endoscopy;  Laterality: N/A;       Current Outpatient Medications   Medication Sig    amlodipine-benazepril 10-20mg (LOTREL) 10-20 mg per capsule Take 1 capsule by mouth once daily.    atorvastatin (LIPITOR) 40 MG tablet Take 1 tablet (40 mg total) by mouth once daily. For cholesterol    tamsulosin (FLOMAX) 0.4 mg Cap Take 1 capsule (0.4 mg total) by mouth once daily.     No current facility-administered medications for this visit.       Review of patient's allergies indicates:  No Known Allergies    Family History   Problem Relation Age of Onset    Cancer Father         unkn type       Social History     Socioeconomic History    Marital status: Single   Tobacco Use    Smoking status: Never Smoker    Smokeless tobacco: Never Used   Substance and Sexual Activity    Alcohol use: Never    Drug use: Never       History of present illness:  Patient comes in today for the right hand.  He has had longstanding Dupuytren's contracture of the right small finger.  It has gotten the point now is having difficulty using the finger      Review of Systems:    Constitution: Negative for chills, fever, and sweats.  Negative for unexplained weight loss.    HENT:  Negative for headaches and blurry vision.    Cardiovascular:Negative for chest pain or irregular heart beat. Negative for hypertension.    Respiratory:  Negative for cough and shortness of breath.    Gastrointestinal: Negative for abdominal pain, heartburn, melena, nausea, and  vomitting.    Genitourinary:  Negative bladder incontinence and dysuria.    Musculoskeletal:  See HPI for details.     Neurological: Negative for numbness.    Psychiatric/Behavioral: Negative for depression.  The patient is not nervous/anxious.      Endocrine: Negative for polyuria    Hematologic/Lymphatic: Negative for bleeding problem.  Does not bruise/bleed easily.    Skin: Negative for poor would healing and rash    Objective:      Physical Examination:    Vital Signs:  There were no vitals filed for this visit.    Body mass index is 31.95 kg/m².    This a well-developed, well nourished patient in no acute distress.  They are alert and oriented and cooperative to examination.        Patient has a significant contracture.  The contracture runs all the way to the PIP joint.  Across is all the way into the mid palm.  He has a contracture at the MP joint of 90°.  He has some decreased sensation on the radial and ulnar borders of the small finger.  No decreased sensation to the ring finger negative Tinel's at the elbow  Pertinent New Results:    XRAY Report / Interpretation:   Three views of the right hand are within normal limits no fracture subluxations or dislocations    Assessment/Plan:      Dupuytren's contracture.  I have offered him a Dupuytren's release.  We spoke about the fact that he already has some numbness in the digit and that they may be worse numbness if there is injury to the digital nerve while removing the contracture.  I do not believe he has ulnar neuritis.  He has a negative Tinel's at the elbow and it does not affect the ring finger at all.  He says the numbness really does not bother him at all and certainly does awaken at night.  He is much more concerned about the contracture.  We also spoke about the risk of wound complications.  He understood wished to proceed      This note was created using Dragon voice recognition software that occasionally misinterpreted phrases or words.

## 2022-05-20 ENCOUNTER — TELEPHONE (OUTPATIENT)
Dept: ORTHOPEDICS | Facility: CLINIC | Age: 67
End: 2022-05-20

## 2022-05-20 DIAGNOSIS — Z01.818 PREOP TESTING: ICD-10-CM

## 2022-05-20 DIAGNOSIS — M72.0 DUPUYTREN'S CONTRACTURE OF RIGHT HAND: Primary | ICD-10-CM

## 2022-07-15 ENCOUNTER — HOSPITAL ENCOUNTER (OUTPATIENT)
Dept: RADIOLOGY | Facility: HOSPITAL | Age: 67
Discharge: HOME | End: 2022-07-15
Attending: ORTHOPAEDIC SURGERY
Payer: MEDICARE

## 2022-07-15 ENCOUNTER — HOSPITAL ENCOUNTER (OUTPATIENT)
Dept: PREADMISSION TESTING | Facility: HOSPITAL | Age: 67
Discharge: HOME | End: 2022-07-15
Attending: ORTHOPAEDIC SURGERY
Payer: MEDICARE

## 2022-07-15 VITALS
WEIGHT: 203.94 LBS | TEMPERATURE: 98 F | BODY MASS INDEX: 32.01 KG/M2 | DIASTOLIC BLOOD PRESSURE: 60 MMHG | SYSTOLIC BLOOD PRESSURE: 148 MMHG | OXYGEN SATURATION: 100 % | HEART RATE: 56 BPM | HEIGHT: 67 IN | RESPIRATION RATE: 18 BRPM

## 2022-07-15 DIAGNOSIS — M72.0 DUPUYTREN'S CONTRACTURE OF RIGHT HAND: ICD-10-CM

## 2022-07-15 DIAGNOSIS — Z41.9 SURGERY, ELECTIVE: Primary | ICD-10-CM

## 2022-07-15 PROCEDURE — 71046 X-RAY EXAM CHEST 2 VIEWS: CPT | Mod: TC

## 2022-07-15 PROCEDURE — 93010 EKG 12-LEAD: ICD-10-PCS | Mod: ,,, | Performed by: INTERNAL MEDICINE

## 2022-07-15 PROCEDURE — 93005 ELECTROCARDIOGRAM TRACING: CPT | Performed by: INTERNAL MEDICINE

## 2022-07-15 PROCEDURE — 93010 ELECTROCARDIOGRAM REPORT: CPT | Mod: ,,, | Performed by: INTERNAL MEDICINE

## 2022-07-15 NOTE — PRE-PROCEDURE INSTRUCTIONS
Pre op instructions given after review of history and meds.  Advised not to take amlodipine/ benazepril am of surgery.  NPO after midnight prior to surgery .  Questions answered and verbalized understanding.

## 2022-07-15 NOTE — DISCHARGE INSTRUCTIONS
To confirm, Your doctor has instructed you that surgery is scheduled for:     Pre-Op will call the afternoon prior to surgery between 4:00 and 6:00 PM with the final arrival time.       1 Person can come with you the day of surgery.    Please park in the Garage Parking and come through front entrance.      GO TO REGISTRATION     After registration, proceed past gift shop and through glass door ( [facility]) Check in at the nurses station to the left.   Do not eat or drink anything after midnight the night before your surgery - THIS INCLUDES  WATER, GUM, MINTS AND CANDY.  YOU MAY BRUSH YOUR TEETH BUT DO NOT SWALLOW     TAKE ONLY THESE MEDICATIONS WITH A SMALL SIP OF WATER THE MORNING OF YOUR PROCEDURE:NONE      PLEASE NOTE:  The surgery schedule has many variables which may affect the time of your surgery case.  Family members should be available if your surgery time changes.  Plan to be here the day of your procedure between 4-6 hours.      DO NOT TAKE THESE MEDICATIONS 5-7 DAYS PRIOR to your procedure or per your surgeon's request: ASPIRIN, ALEVE, ADVIL, IBUPROFEN,  NOEMÍ SELTZER, BC , FISH OIL , VITAMIN E, HERBALS  (May take Tylenol)                                                        IMPORTANT INSTRUCTIONS      Do not smoke, vape or drink alcoholic beverages 24 hours prior to your procedure.  Shower the night before AND the morning of your procedure with a Chlorhexidine wash such as Hibiclens or Dial antibacterial soap from the neck down.   No lotions, powder or oils on your skin after you shower. DO NOT WEAR DEODORANT   Do not get it on your face or in your eyes.  You may use your own shampoo and face wash. This helps your skin to be as bacteria free as possible.    DO NOT remove hair from the surgery site.  Do not shave the incision site unless you are given specific instructions to do so.    Sleep in a bed with clean sheets.    Do not sleep with a pet in the bed.   If you wear contact lenses,  dentures, hearing aids or glasses, bring a container to put them in during surgery and give to a family member for safe keeping.    Please leave all jewelry, piercing's and valuables at home.   Wear rubber soled shoes (no flip flops).  ONLY if you have been diagnosed with sleep apnea please bring your C-PAP machine.  ONLY if you wear home oxygen please bring your portable oxygen tank the day of your procedure.   ONLY for patients requiring bowel prep, written instructions will be given by your doctor's office.  ONLY if you have a neuro stimulator, please bring the controller with you the morning of surgery  ONLY if a type and screen test is needed before surgery, please return:  If your doctor has scheduled you for an overnight stay, bring a small overnight bag with any personal items you need.    Make arrangements in advance for transportation home by a responsible adult.      You must make arrangements for transportation, TAXI'S, UBER'S OR LYFTS ARE NOT ALLOWED.        If you have any questions about these instructions, call (Monday - Friday) Pre-Op Admit  Nursing  at [phone] or the Pre-Op Day Surgery Unit at [phone].

## 2022-07-27 ENCOUNTER — ANESTHESIA (OUTPATIENT)
Dept: SURGERY | Facility: HOSPITAL | Age: 67
End: 2022-07-27
Payer: MEDICARE

## 2022-07-27 ENCOUNTER — HOSPITAL ENCOUNTER (OUTPATIENT)
Facility: HOSPITAL | Age: 67
Discharge: HOME | End: 2022-07-27
Attending: ORTHOPAEDIC SURGERY | Admitting: ORTHOPAEDIC SURGERY
Payer: MEDICARE

## 2022-07-27 ENCOUNTER — ANESTHESIA EVENT (OUTPATIENT)
Dept: SURGERY | Facility: HOSPITAL | Age: 67
End: 2022-07-27
Payer: MEDICARE

## 2022-07-27 VITALS
RESPIRATION RATE: 16 BRPM | BODY MASS INDEX: 32.14 KG/M2 | HEIGHT: 66 IN | TEMPERATURE: 98 F | HEART RATE: 58 BPM | SYSTOLIC BLOOD PRESSURE: 154 MMHG | OXYGEN SATURATION: 100 % | WEIGHT: 200 LBS | DIASTOLIC BLOOD PRESSURE: 56 MMHG

## 2022-07-27 DIAGNOSIS — M72.0 DUPUYTREN'S CONTRACTURE OF RIGHT HAND: Primary | ICD-10-CM

## 2022-07-27 DIAGNOSIS — Z41.9 SURGERY, ELECTIVE: ICD-10-CM

## 2022-07-27 LAB — SARS-COV-2 RDRP RESP QL NAA+PROBE: NEGATIVE

## 2022-07-27 PROCEDURE — 71000015 HC POSTOP RECOV 1ST HR: Performed by: ORTHOPAEDIC SURGERY

## 2022-07-27 PROCEDURE — U0002 COVID-19 LAB TEST NON-CDC: HCPCS | Performed by: ORTHOPAEDIC SURGERY

## 2022-07-27 PROCEDURE — 27000671 HC TUBING MICROBORE EXT: Performed by: ANESTHESIOLOGY

## 2022-07-27 PROCEDURE — 63600175 PHARM REV CODE 636 W HCPCS: Performed by: NURSE ANESTHETIST, CERTIFIED REGISTERED

## 2022-07-27 PROCEDURE — 27201423 OPTIME MED/SURG SUP & DEVICES STERILE SUPPLY: Performed by: ORTHOPAEDIC SURGERY

## 2022-07-27 PROCEDURE — 27000284 HC CANNULA NASAL: Performed by: ANESTHESIOLOGY

## 2022-07-27 PROCEDURE — 25000003 PHARM REV CODE 250: Performed by: NURSE ANESTHETIST, CERTIFIED REGISTERED

## 2022-07-27 PROCEDURE — 37000009 HC ANESTHESIA EA ADD 15 MINS: Performed by: ORTHOPAEDIC SURGERY

## 2022-07-27 PROCEDURE — 88304 TISSUE EXAM BY PATHOLOGIST: CPT | Mod: TC

## 2022-07-27 PROCEDURE — 27000673 HC TUBING BLOOD Y: Performed by: ANESTHESIOLOGY

## 2022-07-27 PROCEDURE — 37000008 HC ANESTHESIA 1ST 15 MINUTES: Performed by: ORTHOPAEDIC SURGERY

## 2022-07-27 PROCEDURE — 36000706: Performed by: ORTHOPAEDIC SURGERY

## 2022-07-27 PROCEDURE — 36000707: Performed by: ORTHOPAEDIC SURGERY

## 2022-07-27 RX ORDER — PROPOFOL 10 MG/ML
VIAL (ML) INTRAVENOUS
Status: DISCONTINUED | OUTPATIENT
Start: 2022-07-27 | End: 2022-07-27

## 2022-07-27 RX ORDER — OXYCODONE AND ACETAMINOPHEN 7.5; 325 MG/1; MG/1
1 TABLET ORAL EVERY 4 HOURS PRN
Qty: 28 TABLET | Refills: 0 | Status: SHIPPED | OUTPATIENT
Start: 2022-07-27 | End: 2022-10-04

## 2022-07-27 RX ORDER — SODIUM CHLORIDE, SODIUM LACTATE, POTASSIUM CHLORIDE, CALCIUM CHLORIDE 600; 310; 30; 20 MG/100ML; MG/100ML; MG/100ML; MG/100ML
INJECTION, SOLUTION INTRAVENOUS CONTINUOUS PRN
Status: DISCONTINUED | OUTPATIENT
Start: 2022-07-27 | End: 2022-07-27

## 2022-07-27 RX ORDER — MIDAZOLAM HYDROCHLORIDE 1 MG/ML
INJECTION INTRAMUSCULAR; INTRAVENOUS
Status: DISCONTINUED | OUTPATIENT
Start: 2022-07-27 | End: 2022-07-27

## 2022-07-27 RX ORDER — FENTANYL CITRATE 50 UG/ML
INJECTION, SOLUTION INTRAMUSCULAR; INTRAVENOUS
Status: DISCONTINUED | OUTPATIENT
Start: 2022-07-27 | End: 2022-07-27

## 2022-07-27 RX ORDER — LIDOCAINE HYDROCHLORIDE 5 MG/ML
INJECTION, SOLUTION INFILTRATION; INTRAVENOUS
Status: DISCONTINUED | OUTPATIENT
Start: 2022-07-27 | End: 2022-07-27

## 2022-07-27 RX ADMIN — LIDOCAINE HYDROCHLORIDE 50 ML: 5 INJECTION, SOLUTION INFILTRATION at 07:07

## 2022-07-27 RX ADMIN — PROPOFOL 20 MG: 10 INJECTION, EMULSION INTRAVENOUS at 07:07

## 2022-07-27 RX ADMIN — FENTANYL CITRATE 50 MCG: 50 INJECTION INTRAMUSCULAR; INTRAVENOUS at 07:07

## 2022-07-27 RX ADMIN — MIDAZOLAM HYDROCHLORIDE 1 MG: 1 INJECTION, SOLUTION INTRAMUSCULAR; INTRAVENOUS at 07:07

## 2022-07-27 RX ADMIN — DEXTROSE 2 G: 50 INJECTION, SOLUTION INTRAVENOUS at 07:07

## 2022-07-27 RX ADMIN — PROPOFOL 40 MG: 10 INJECTION, EMULSION INTRAVENOUS at 07:07

## 2022-07-27 RX ADMIN — SODIUM CHLORIDE, SODIUM LACTATE, POTASSIUM CHLORIDE, AND CALCIUM CHLORIDE: .6; .31; .03; .02 INJECTION, SOLUTION INTRAVENOUS at 07:07

## 2022-07-27 NOTE — ANESTHESIA PROCEDURE NOTES
Peripheral Block    Patient location during procedure: OR    Reason for block: primary anesthetic    Diagnosis: Dip future and contracture   Start time: 7/27/2022 7:15 AM  Timeout: 7/27/2022 7:15 AM   End time: 7/27/2022 7:19 AM    Staffing  Authorizing Provider: Isidro Schwartz MD  Performing Provider: Isidro Schwartz MD    Preanesthetic Checklist  Completed: patient identified, IV checked, site marked, risks and benefits discussed, surgical consent, monitors and equipment checked, pre-op evaluation and timeout performed  Peripheral Block  Patient position: supine  Patient monitoring: heart rate, cardiac monitor, continuous pulse ox, continuous capnometry and frequent blood pressure checks  Block type: Teagan block  Laterality: right  Injection technique: single shot  Needle  Needle localization: anatomical landmarks     Assessment  Paresthesia pain: none  Heart rate change: no  Slow fractionated injection: yes  Pain Tolerance: comfortable throughout block      Additional Notes  After upper extremity exsanguinated, 40 cc lidocaine 0.5% (plain and preservative-free) injected easily per 22 gauge IV in dorsum of hand, with tourniquet inflated to 250 mmHg.

## 2022-07-27 NOTE — TRANSFER OF CARE
"Anesthesia Transfer of Care Note    Patient: Aleksandar Knight Sr.    Procedure(s) Performed: Procedure(s) (LRB):  RELEASE, DUPUYTREN CONTRACTURE (Right)    Patient location: OPS    Anesthesia Type: MAC    Transport from OR: Transported from OR on room air with adequate spontaneous ventilation    Post pain: adequate analgesia    Post assessment: no apparent anesthetic complications    Post vital signs: stable    Level of consciousness: awake, alert and oriented    Nausea/Vomiting: no nausea/vomiting    Complications: none    Transfer of care protocol was followed      Last vitals:   Visit Vitals  BP (!) 163/56 (BP Location: Left arm, Patient Position: Sitting)   Pulse 71   Temp 36.7 °C (98.1 °F) (Oral)   Resp 16   Ht 5' 6" (1.676 m)   Wt 90.7 kg (200 lb)   SpO2 100%   BMI 32.28 kg/m²     "

## 2022-07-27 NOTE — OP NOTE
[facility]  Surgery Department  Operative Note    SUMMARY     Date of Procedure: 7/27/2022     Procedure:  Right small finger Dupuytren's release open    Surgeon(s) and Role:     * Rafi Andre MD - Primary    Assisting Surgeon:  Michelle    Pre-Operative Diagnosis: Dupuytren's contracture of right hand [M72.0]    Post-Operative Diagnosis: Post-Op Diagnosis Codes:     * Dupuytren's contracture of right hand [M72.0]    Anesthesia: Local    Intraoperative Findings:  Contracture of the small finger extending from the PIP joint to the mid palm.  Contracture consisting of 80° at the MP joint    Description of the Findings of the Procedure:  Patient was placed on the operating table supine position.  His exact incision was made along the contracture.  Was carried down sharply through the skin and the flaps were raised.  We now meticulously dissected out the contracture from proximal to distal using Littler scissors.  We were very careful to stay out of the neurovascular bundle.  The contracture was taken down all the way to the level of the proximal phalanx.  We now had full extension of the small finger.  We copiously irrigated.  The flaps were closed with 4-0 nylon sutures.  Sterile dressings were applied and the patient was noted to be in stable condition    Complications: No    Estimated Blood Loss (EBL): * No values recorded between 7/27/2022  7:28 AM and 7/27/2022  7:36 AM *           Implants: * No implants in log *    Specimens:   Specimen (24h ago, onward)            None                  Condition: Good    Disposition: PACU - hemodynamically stable.                 Discharge Note    SUMMARY     Admit Date: 7/27/2022    Discharge Date and Time:  07/27/2022 7:36 AM    Hospital Course (synopsis of major diagnoses, care, treatment, and services provided during the course of the hospital stay): Uneventful      Final Diagnosis: Post-Op Diagnosis Codes:     * Dupuytren's contracture of right hand  [M72.0]    Disposition: Home or Self Care    Follow Up/Patient Instructions:     Medications:  Reconciled Home Medications:   Current Discharge Medication List      START taking these medications    Details   oxyCODONE-acetaminophen (PERCOCET) 7.5-325 mg per tablet Take 1 tablet by mouth every 4 (four) hours as needed for Pain.  Qty: 28 tablet, Refills: 0    Comments: Quantity prescribed more than 7 day supply? Yes, quantity medically necessary         CONTINUE these medications which have NOT CHANGED    Details   amlodipine-benazepril 10-20mg (LOTREL) 10-20 mg per capsule Take 1 capsule by mouth once daily.  Qty: 90 capsule, Refills: 3    Associated Diagnoses: Essential hypertension, benign      atorvastatin (LIPITOR) 40 MG tablet Take 1 tablet (40 mg total) by mouth once daily. For cholesterol  Qty: 90 tablet, Refills: 3    Associated Diagnoses: Mixed hyperlipidemia      tamsulosin (FLOMAX) 0.4 mg Cap Take 1 capsule (0.4 mg total) by mouth once daily.  Qty: 90 capsule, Refills: 1    Associated Diagnoses: BPH associated with nocturia           Discharge Procedure Orders   Diet Adult Regular     Leave dressing on - Keep it clean, dry, and intact until clinic visit     Activity as tolerated

## 2022-07-27 NOTE — H&P
CC/Indication for Procedure: 67 y.o. male with Dupuytren's contracture of right hand [M72.0].    Patient scheduled for [facility] RELEASE PALM CONTRACT,OPEN,PARTIAL [[id number]] (RELEASE, DUPUYTREN CONTRACTURE).    Past Medical History:   Diagnosis Date    Dupuytren contracture 2022    Hyperlipidemia     Hypertension      Past Surgical History:   Procedure Laterality Date    COLONOSCOPY N/A 11/20/2019    Procedure: COLONOSCOPY;  Surgeon: Nabeel Hart MD;  Location: [facility];  Service: Endoscopy;  Laterality: N/A;     Family History   Problem Relation Age of Onset    Cancer Father         unkn type     Social History     Socioeconomic History    Marital status: Single   Tobacco Use    Smoking status: Never Smoker    Smokeless tobacco: Never Used   Substance and Sexual Activity    Alcohol use: Never    Drug use: Never       Review of patient's allergies indicates:  No Known Allergies    No current facility-administered medications for this encounter.    ROS:    Denies chest pain or palpitations  Denies shortness of breath  Denies fevers or chills  Denies chest pain  Denies abdominal pain    PE:    General Appearance: Well nourished  Orientation: Oriented to time, place, person  Mental Status: Alert  Heart: RRR  Lungs: CTA  Abdomen: Soft and non-tender    Anesthesia/Surgery risks, benefits and alternative options discussed and understood by patient/family.    This note was created using Dragon voice recognition software that occasionally misinterpreted phrases or words.

## 2022-07-27 NOTE — ANESTHESIA PREPROCEDURE EVALUATION
07/27/2022  Aleksandar Knight [place] is a 67 y.o., male.      Pre-op Assessment    I have reviewed the Patient Summary Reports.     I have reviewed the Nursing Notes. I have reviewed the NPO Status.   I have reviewed the Medications.     Review of Systems  Anesthesia Hx:  No problems with previous Anesthesia  Denies Family Hx of Anesthesia complications.   Denies Personal Hx of Anesthesia complications.   Social:  Non-Smoker    Hematology/Oncology:         -- Anemia (mild):   EENT/Dental:EENT/Dental Normal   Cardiovascular:   Hypertension hyperlipidemia    Pulmonary:  Pulmonary Normal    Renal/:   Chronic Renal Disease (Stage 3)    Hepatic/GI:  Hepatic/GI Normal    Musculoskeletal:  Musculoskeletal Normal  Musculoskeletal General/Symptoms: (Right Dupuytren Contracture)    Neurological:  Neurology Normal    Endocrine:  Endocrine Normal    Psych:  Psychiatric Normal           Physical Exam  General: Well nourished    Airway:  Mallampati: II   Mouth Opening: Normal  TM Distance: Normal  Tongue: Normal  Neck ROM: Normal ROM    Dental:  Intact  Back molar chipped  Chest/Lungs:  Clear to auscultation, Normal Respiratory Rate    Heart:  Rate: Normal  Rhythm: Regular Rhythm        Anesthesia Plan  Type of Anesthesia, risks & benefits discussed:    Anesthesia Type: MAC, Regional  Intra-op Monitoring Plan: Standard ASA Monitors  Post Op Pain Control Plan: IV/PO Opioids PRN and multimodal analgesia  Induction:  IV  Airway Plan: Video  Informed Consent: Informed consent signed with the Patient and all parties understand the risks and agree with anesthesia plan.  All questions answered.   ASA Score: 3  Anesthesia Plan Notes: Hoskins block    Ready For Surgery From Anesthesia Perspective.     .

## 2022-07-27 NOTE — ANESTHESIA POSTPROCEDURE EVALUATION
Anesthesia Post Evaluation    Patient: Aleksandar Knight     Procedure(s) Performed: Procedure(s) (LRB):  RELEASE, DUPUYTREN CONTRACTURE (Right)    Final Anesthesia Type: regional      Patient location: ASU.  Patient participation: Yes- Able to Participate  Level of consciousness: awake and alert  Post-procedure vital signs: reviewed and stable  Pain management: adequate  Airway patency: patent    PONV status at discharge: No PONV  Anesthetic complications: no      Cardiovascular status: blood pressure returned to baseline and stable  Respiratory status: unassisted and room air  Hydration status: euvolemic  Follow-up not needed.          Vitals Value Taken Time   /56 07/27/22 0828   Temp 36.7 °C (98 °F) 07/27/22 0828   Pulse 58 07/27/22 0828   Resp 16 07/27/22 0828   SpO2 100 % 07/27/22 0828         No case tracking events are documented in the log.      Pain/Cyril Score: No data recorded

## 2022-08-10 ENCOUNTER — OFFICE VISIT (OUTPATIENT)
Dept: ORTHOPEDICS | Facility: CLINIC | Age: 67
End: 2022-08-10
Payer: MEDICARE

## 2022-08-10 VITALS — BODY MASS INDEX: 32.14 KG/M2 | HEIGHT: 66 IN | WEIGHT: 200 LBS

## 2022-08-10 DIAGNOSIS — Z98.890 HX OF HAND SURGERY: Primary | ICD-10-CM

## 2022-08-10 DIAGNOSIS — M72.0 DUPUYTREN'S CONTRACTURE OF RIGHT HAND: ICD-10-CM

## 2022-08-10 PROCEDURE — 1101F PT FALLS ASSESS-DOCD LE1/YR: CPT | Mod: CPTII,S$GLB,, | Performed by: PHYSICIAN ASSISTANT

## 2022-08-10 PROCEDURE — 1159F MED LIST DOCD IN RCRD: CPT | Mod: CPTII,S$GLB,, | Performed by: PHYSICIAN ASSISTANT

## 2022-08-10 PROCEDURE — 1125F AMNT PAIN NOTED PAIN PRSNT: CPT | Mod: CPTII,S$GLB,, | Performed by: PHYSICIAN ASSISTANT

## 2022-08-10 PROCEDURE — 4010F PR ACE/ARB THEARPY RXD/TAKEN: ICD-10-PCS | Mod: CPTII,S$GLB,, | Performed by: PHYSICIAN ASSISTANT

## 2022-08-10 PROCEDURE — 1160F PR REVIEW ALL MEDS BY PRESCRIBER/CLIN PHARMACIST DOCUMENTED: ICD-10-PCS | Mod: CPTII,S$GLB,, | Performed by: PHYSICIAN ASSISTANT

## 2022-08-10 PROCEDURE — 99024 PR POST-OP FOLLOW-UP VISIT: ICD-10-PCS | Mod: S$GLB,,, | Performed by: PHYSICIAN ASSISTANT

## 2022-08-10 PROCEDURE — 3066F NEPHROPATHY DOC TX: CPT | Mod: CPTII,S$GLB,, | Performed by: PHYSICIAN ASSISTANT

## 2022-08-10 PROCEDURE — 3008F PR BODY MASS INDEX (BMI) DOCUMENTED: ICD-10-PCS | Mod: CPTII,S$GLB,, | Performed by: PHYSICIAN ASSISTANT

## 2022-08-10 PROCEDURE — 1159F PR MEDICATION LIST DOCUMENTED IN MEDICAL RECORD: ICD-10-PCS | Mod: CPTII,S$GLB,, | Performed by: PHYSICIAN ASSISTANT

## 2022-08-10 PROCEDURE — 3288F PR FALLS RISK ASSESSMENT DOCUMENTED: ICD-10-PCS | Mod: CPTII,S$GLB,, | Performed by: PHYSICIAN ASSISTANT

## 2022-08-10 PROCEDURE — 3066F PR DOCUMENTATION OF TREATMENT FOR NEPHROPATHY: ICD-10-PCS | Mod: CPTII,S$GLB,, | Performed by: PHYSICIAN ASSISTANT

## 2022-08-10 PROCEDURE — 1101F PR PT FALLS ASSESS DOC 0-1 FALLS W/OUT INJ PAST YR: ICD-10-PCS | Mod: CPTII,S$GLB,, | Performed by: PHYSICIAN ASSISTANT

## 2022-08-10 PROCEDURE — 3061F NEG MICROALBUMINURIA REV: CPT | Mod: CPTII,S$GLB,, | Performed by: PHYSICIAN ASSISTANT

## 2022-08-10 PROCEDURE — 1125F PR PAIN SEVERITY QUANTIFIED, PAIN PRESENT: ICD-10-PCS | Mod: CPTII,S$GLB,, | Performed by: PHYSICIAN ASSISTANT

## 2022-08-10 PROCEDURE — 1160F RVW MEDS BY RX/DR IN RCRD: CPT | Mod: CPTII,S$GLB,, | Performed by: PHYSICIAN ASSISTANT

## 2022-08-10 PROCEDURE — 3061F PR NEG MICROALBUMINURIA RESULT DOCUMENTED/REVIEW: ICD-10-PCS | Mod: CPTII,S$GLB,, | Performed by: PHYSICIAN ASSISTANT

## 2022-08-10 PROCEDURE — 3008F BODY MASS INDEX DOCD: CPT | Mod: CPTII,S$GLB,, | Performed by: PHYSICIAN ASSISTANT

## 2022-08-10 PROCEDURE — 3288F FALL RISK ASSESSMENT DOCD: CPT | Mod: CPTII,S$GLB,, | Performed by: PHYSICIAN ASSISTANT

## 2022-08-10 PROCEDURE — 99024 POSTOP FOLLOW-UP VISIT: CPT | Mod: S$GLB,,, | Performed by: PHYSICIAN ASSISTANT

## 2022-08-10 PROCEDURE — 4010F ACE/ARB THERAPY RXD/TAKEN: CPT | Mod: CPTII,S$GLB,, | Performed by: PHYSICIAN ASSISTANT

## 2022-08-10 NOTE — PROGRESS NOTES
[facility] ORTHOPEDICS  [address]  [place] [address]  Phone: [phone]   [phone]    Patient's PCP:Aditya Pulliam MD  Referring Provider: No ref. provider found    POST-OP Note:    Subjective:        Chief Complaint:   Chief Complaint   Patient presents with    Right Hand - Post-op Evaluation     Right Small Finger Dupuytrens Release 07/27/22, patient reports that the pain is getting much better.       Past Medical History:   Diagnosis Date    Dupuytren contracture 2022    Hyperlipidemia     Hypertension        Past Surgical History:   Procedure Laterality Date    COLONOSCOPY N/A 11/20/2019    Procedure: COLONOSCOPY;  Surgeon: Nabeel Hart MD;  Location: [facility] ENDO;  Service: Endoscopy;  Laterality: N/A;    DUPUYTREN CONTRACTURE RELEASE Right 7/27/2022    Procedure: RELEASE, DUPUYTREN CONTRACTURE;  Surgeon: Rafi Andre MD;  Location: [facility] OR;  Service: Orthopedics;  Laterality: Right;       Current Outpatient Medications   Medication Sig    amlodipine-benazepril 10-20mg (LOTREL) 10-20 mg per capsule Take 1 capsule by mouth once daily.    atorvastatin (LIPITOR) 40 MG tablet Take 1 tablet (40 mg total) by mouth once daily. For cholesterol    tamsulosin (FLOMAX) 0.4 mg Cap Take 1 capsule (0.4 mg total) by mouth once daily.    oxyCODONE-acetaminophen (PERCOCET) 7.5-325 mg per tablet Take 1 tablet by mouth every 4 (four) hours as needed for Pain. (Patient not taking: Reported on 8/10/2022)     No current facility-administered medications for this visit.       Review of patient's allergies indicates:  No Known Allergies    Family History   Problem Relation Age of Onset    Cancer Father         unkn type       Social History     Socioeconomic History    Marital status: Single   Tobacco Use    Smoking status: Never Smoker    Smokeless tobacco: Never Used   Substance and Sexual Activity    Alcohol use: Never    Drug use: Never       History of present illness:   67-year-old male returns to clinic today for his 1st postop visit, he is status post a Dupuytren's contracture release of the right little finger.  This was done on July 27th.  He has been applying some Neosporin to the wound to help soften some of the sloughing skin.  No copious bleeding, no redness, no purulent drainage.      Review of Systems:    Musculoskeletal:  See HPI       Objective:        Physical Examination:    Vital Signs: There were no vitals filed for this visit.    Body mass index is 32.28 kg/m².    This a well-developed, well nourished patient in no acute distress.  They are alert and oriented and cooperative to examination.        Examination of the right hand, palmar aspect, there is a incision over the 5th or little finger.  This appears to be well healed at this time, there is some overlap of the wound edges, but no bleeding is noted, no purulent drainage, no signs symptoms of infection.  Patient has good range of motion but he cannot make a fist quite yet.    Pertinent New Results:     XRAY Report / Interpretation:        Assessment:       1. Hx of hand surgery    2. Dupuytren's contracture of right hand      Plan:     Hx of hand surgery  Comments:  Right Dupuytren contracture release 7/27/22    Dupuytren's contracture of right hand        Follow up in about 4 weeks (around 9/7/2022) for PO Right Small Finger Dupuytrens Release 07/27/22.    Stable status post Dupuytren's contracture release of the right little finger.  Removed his sutures today, we discussed wound care, we did reinforce the surgical site with some sterile tape dressings as there was some overlap of the wound edges.  No evidence of dehiscence however.  He can resume normal use of the right hand as tolerated.  Will check him back in a month to evaluate his range of motion.  If this has returned and he has no questions or concerns he can certainly cancel    [unfilled]  NGUYỄN Jackson PA-C    This note was created  using MModal voice recognition software that occasionally misinterprets words or phrases.

## 2022-08-16 ENCOUNTER — TELEPHONE (OUTPATIENT)
Dept: ORTHOPEDICS | Facility: CLINIC | Age: 67
End: 2022-08-16

## 2022-08-16 NOTE — TELEPHONE ENCOUNTER
"Received call from patients nurse  Yared, with [facility]. Yared stated patient reported pain, swelling, unable to grasp to the right hand since surgery. I called the patient. Patient stated there is "something under my palm like a pus bubble that is soft." offered patient an appointment today and tomorrow which he declined. Stated " [place] if I get finished with my doctors appointment early tomorrow I will call." appointment made for 8 am on Thursday to see Dr Andre.   "

## 2022-08-18 ENCOUNTER — OFFICE VISIT (OUTPATIENT)
Dept: ORTHOPEDICS | Facility: CLINIC | Age: 67
End: 2022-08-18
Payer: MEDICARE

## 2022-08-18 VITALS — WEIGHT: 200 LBS | BODY MASS INDEX: 32.14 KG/M2 | HEIGHT: 66 IN

## 2022-08-18 DIAGNOSIS — M72.0 DUPUYTREN'S CONTRACTURE OF RIGHT HAND: ICD-10-CM

## 2022-08-18 DIAGNOSIS — Z98.890 HX OF HAND SURGERY: Primary | ICD-10-CM

## 2022-08-18 PROCEDURE — 3066F NEPHROPATHY DOC TX: CPT | Mod: CPTII,S$GLB,, | Performed by: ORTHOPAEDIC SURGERY

## 2022-08-18 PROCEDURE — 3066F PR DOCUMENTATION OF TREATMENT FOR NEPHROPATHY: ICD-10-PCS | Mod: CPTII,S$GLB,, | Performed by: ORTHOPAEDIC SURGERY

## 2022-08-18 PROCEDURE — 1101F PT FALLS ASSESS-DOCD LE1/YR: CPT | Mod: CPTII,S$GLB,, | Performed by: ORTHOPAEDIC SURGERY

## 2022-08-18 PROCEDURE — 1159F MED LIST DOCD IN RCRD: CPT | Mod: CPTII,S$GLB,, | Performed by: ORTHOPAEDIC SURGERY

## 2022-08-18 PROCEDURE — 3061F NEG MICROALBUMINURIA REV: CPT | Mod: CPTII,S$GLB,, | Performed by: ORTHOPAEDIC SURGERY

## 2022-08-18 PROCEDURE — 4010F ACE/ARB THERAPY RXD/TAKEN: CPT | Mod: CPTII,S$GLB,, | Performed by: ORTHOPAEDIC SURGERY

## 2022-08-18 PROCEDURE — 3288F FALL RISK ASSESSMENT DOCD: CPT | Mod: CPTII,S$GLB,, | Performed by: ORTHOPAEDIC SURGERY

## 2022-08-18 PROCEDURE — 3288F PR FALLS RISK ASSESSMENT DOCUMENTED: ICD-10-PCS | Mod: CPTII,S$GLB,, | Performed by: ORTHOPAEDIC SURGERY

## 2022-08-18 PROCEDURE — 1101F PR PT FALLS ASSESS DOC 0-1 FALLS W/OUT INJ PAST YR: ICD-10-PCS | Mod: CPTII,S$GLB,, | Performed by: ORTHOPAEDIC SURGERY

## 2022-08-18 PROCEDURE — 3061F PR NEG MICROALBUMINURIA RESULT DOCUMENTED/REVIEW: ICD-10-PCS | Mod: CPTII,S$GLB,, | Performed by: ORTHOPAEDIC SURGERY

## 2022-08-18 PROCEDURE — 99024 POSTOP FOLLOW-UP VISIT: CPT | Mod: S$GLB,,, | Performed by: ORTHOPAEDIC SURGERY

## 2022-08-18 PROCEDURE — 1125F AMNT PAIN NOTED PAIN PRSNT: CPT | Mod: CPTII,S$GLB,, | Performed by: ORTHOPAEDIC SURGERY

## 2022-08-18 PROCEDURE — 1159F PR MEDICATION LIST DOCUMENTED IN MEDICAL RECORD: ICD-10-PCS | Mod: CPTII,S$GLB,, | Performed by: ORTHOPAEDIC SURGERY

## 2022-08-18 PROCEDURE — 3008F PR BODY MASS INDEX (BMI) DOCUMENTED: ICD-10-PCS | Mod: CPTII,S$GLB,, | Performed by: ORTHOPAEDIC SURGERY

## 2022-08-18 PROCEDURE — 99024 PR POST-OP FOLLOW-UP VISIT: ICD-10-PCS | Mod: S$GLB,,, | Performed by: ORTHOPAEDIC SURGERY

## 2022-08-18 PROCEDURE — 3008F BODY MASS INDEX DOCD: CPT | Mod: CPTII,S$GLB,, | Performed by: ORTHOPAEDIC SURGERY

## 2022-08-18 PROCEDURE — 1125F PR PAIN SEVERITY QUANTIFIED, PAIN PRESENT: ICD-10-PCS | Mod: CPTII,S$GLB,, | Performed by: ORTHOPAEDIC SURGERY

## 2022-08-18 PROCEDURE — 4010F PR ACE/ARB THEARPY RXD/TAKEN: ICD-10-PCS | Mod: CPTII,S$GLB,, | Performed by: ORTHOPAEDIC SURGERY

## 2022-08-18 NOTE — PROGRESS NOTES
[facility] ORTHOPEDICS POST-OP NOTE    Subjective:           Chief Complaint:   Chief Complaint   Patient presents with    Right Hand - Post-op Evaluation     Right small finger dupuytrens release 7/27, patient has increased palm pain, he's unable to grasp, some swelling, he also states that he gets shoot pains up his arm.       Past Medical History:   Diagnosis Date    Dupuytren contracture 2022    Hyperlipidemia     Hypertension        Past Surgical History:   Procedure Laterality Date    COLONOSCOPY N/A 11/20/2019    Procedure: COLONOSCOPY;  Surgeon: Nabeel Hart MD;  Location: [facility] ENDO;  Service: Endoscopy;  Laterality: N/A;    DUPUYTREN CONTRACTURE RELEASE Right 7/27/2022    Procedure: RELEASE, DUPUYTREN CONTRACTURE;  Surgeon: Rafi Andre MD;  Location: [facility] OR;  Service: Orthopedics;  Laterality: Right;       Current Outpatient Medications   Medication Sig    amlodipine-benazepril 10-20mg (LOTREL) 10-20 mg per capsule Take 1 capsule by mouth once daily.    atorvastatin (LIPITOR) 40 MG tablet Take 1 tablet (40 mg total) by mouth once daily. For cholesterol    tamsulosin (FLOMAX) 0.4 mg Cap Take 1 capsule (0.4 mg total) by mouth once daily.    oxyCODONE-acetaminophen (PERCOCET) 7.5-325 mg per tablet Take 1 tablet by mouth every 4 (four) hours as needed for Pain. (Patient not taking: No sig reported)     No current facility-administered medications for this visit.       Review of patient's allergies indicates:  No Known Allergies    Family History   Problem Relation Age of Onset    Cancer Father         unkn type       Social History     Socioeconomic History    Marital status: Single   Tobacco Use    Smoking status: Never Smoker    Smokeless tobacco: Never Used   Substance and Sexual Activity    Alcohol use: Never    Drug use: Never       History of present illness:  Patient returns status post of transfer please right small finger.  He complains of head pain and difficulty closing his  hand.      Review of Systems:    Musculoskeletal:  See HPI      Objective:        Physical Examination:    Vital Signs:  There were no vitals filed for this visit.    Body mass index is 32.28 kg/m².    This a well-developed, well nourished patient in no acute distress.  They are alert and oriented and cooperative to examination.        Patient has some thickening of the wound edges in the palm.  He can bring his hand within a cm of the palm.  He does have full extension of the small finger.  Sensation is intact.  Pertinent New Results:    XRAY Report / Interpretation:       Assessment/Plan:      Status post Dupuytren's contracture.  Start physical therapy.  He definitely needs some help with his motion.  Will watch this wound very carefully.  No evidence of infection.  I will see him back and 2 weeks    This note was created using Dragon voice recognition software that occasionally misinterpreted phrases or words.

## 2022-08-23 ENCOUNTER — TELEPHONE (OUTPATIENT)
Dept: ORTHOPEDICS | Facility: CLINIC | Age: 67
End: 2022-08-23

## 2022-08-23 DIAGNOSIS — M72.0 DUPUYTREN'S CONTRACTURE OF RIGHT HAND: ICD-10-CM

## 2022-08-23 DIAGNOSIS — Z98.890 HX OF HAND SURGERY: Primary | ICD-10-CM

## 2022-08-29 ENCOUNTER — CLINICAL SUPPORT (OUTPATIENT)
Dept: REHABILITATION | Facility: HOSPITAL | Age: 67
End: 2022-08-29
Attending: ORTHOPAEDIC SURGERY
Payer: MEDICARE

## 2022-08-29 DIAGNOSIS — Z98.890 HX OF HAND SURGERY: ICD-10-CM

## 2022-08-29 DIAGNOSIS — M72.0 DUPUYTREN'S CONTRACTURE OF RIGHT HAND: Primary | ICD-10-CM

## 2022-08-29 PROCEDURE — 97165 OT EVAL LOW COMPLEX 30 MIN: CPT | Mod: PN

## 2022-08-29 NOTE — PLAN OF CARE
[facility] Occupational Therapy  Initial Evaluation   Date: 8/29/2022  Name: Aleksandar Knight Sr.  Clinic Number: [id number]  Therapy Diagnosis: right hand stiffness and pain   Physician: Rafi Andre MD  Physician Orders: OT for RT hand, 2x week for 4 weeks   Medical Diagnosis: Z98.890 (ICD-10-CM) - Hx of hand surgery M72.0 (ICD-10-CM) - Dupuytren's contracture of right hand   Surgical Procedure and Date: 7/27/2022; Right small finger Dupuytren's release open  Evaluation Date: 8/29/2022  Insurance Authorization Period Expiration: 12/31/22  Plan of Care Certification Period: 10/31/22  Date of Return to MD: 9/06/22  Visit # / Visits authorized: Brandin 1 / 1    pending  Time In:11:00  Time Out: 11:45  Total Billable Time: 0 minutes    Precautions:  Standard     post op    Subjective   Patient states: Can't  anything. Putting Neosporyn on it. Can't straighten it. Noted a raised area along scar is very sensitive as well as rest of scar and into little finger. Getting a sharp to burning pain. Notes numbness present at little tip (distal phalanx) and increases with pushing his finger straight (extension).  Involved Side: right  Dominant Side: Right  Date of Onset: 7/27/22  History of Current Condition/Mechanism of Injury: gradual onset of symptoms over ~ 1 year, didn't have any pain before surgery just couldn't get my hand in my pocket and was hard to pick things up  Imaging:  none  Previous Therapy: none  Past Medical History/Physical Systems Review:    has a past medical history of Dupuytren contracture, Hyperlipidemia, and Hypertension.   has a past surgical history that includes Colonoscopy (N/A, 11/20/2019) and Dupuytren contracture release (Right, 7/27/2022).  has a current medication list which includes the following prescription(s): amlodipine-benazepril 10-20mg, atorvastatin, oxycodone-acetaminophen, and tamsulosin.  Review of patient's allergies indicates:  No Known Allergies     Patient's  Goals for Therapy: able to ride his motorcycle, get rid of the pain    Pain:  Functional Pain Scale Rating 0-10: at eval  2-3/10  2/10 at least  10/10 at worst  Location: right hand   Description: Sharp to burning  Aggravating Factors: grasping and sometimes at rest  Easing Factors: Neosporyn and wrap at night   Occupation: retired construction     Functional Limitations/Social History:  Previous functional status includes: Independent with all self care and home management.  Current Functional Status   Home/Living environment : lives alone      ADL Assessment  ADL    Dressing Independent    Eating Independent awkward   unable to cut foods   Toileting independent   Cooking Just warming    with compensation able to open some containers  unable to lift most things using left   Bathing/Grooming Minimal use of right to bathe, unable to shave with the right   Household tasks Able to perform laundry with compensation, awkward and difficulty with mop/sweep; unable to perform lawn work   By patient report             FOTO score: Eval 8/29  36%  Goal 68%  Objective   Observation:  mild swelling throughout hand  no open areas   raised area along scar near most proximal portion; jumping at times with palpation and motion  Palpation:  moderate to severe tenderness along scar into little finger and periphery of scar  HAND A/PROM                            8/29/2022                                                                                                   RIGHT                                                                        INDEX      LONG      RING           LITTLE                 MCP Ext-Flexion       0-60        0-45      15-40/0-70     30-40/10-75  PIP Ext-Flexion         0-75        0-50      15-70/0-75     35-50/20-55  DIP Ext-Flexion         0-50        0-60       0-50 /NT         0-65/NT    Passively able to bring index and long fingers to touch palm with flexion at all joints.  Home Exercise  Program/Education:  Issued HEP of massage, joint blocking flexion of PIPs and DIPs, tendon glides, and little finger adduction (see patient instructions in EMR) and educated on modality use for pain management . Exercises were reviewed and he was able to demonstrate them prior to the end of the session. Pt received a written copy of exercises to perform at home. He demonstrated good  understanding of the education provided.  Pt was advised to perform these exercises to point of stretch not pain.  Patient Education: role of OT, goals for OT, scheduling/cancellations - pt verbalized understanding. Discussed insurance limitations with patient.    Assessment     Patient is a 67 y.o. right handed male presenting to skilled OT with diagnosis of right small Dupuytren's contracture for which he underwent open surgical release on 7/27/22. Patient with pain of 2-3/10 to 10/10 described as sharp and burning.  He is moderately to severely tender to palpation at surgical site and periphery of scar. He has mild swelling at right hand.  His ROM is moderately to severely limited. He reports independence with self care with use of left and compensation and daily activities. His FOTO score: 36%.  A brief history was obtained from the patient and MD note.  During the evaluation, the patient required no modification or assistance.  The following anticipated barriers/co-morbid conditions/personal factors may affect the plan of care: possible neuroma.   Patient will benefit from OT to address problems hindering functional use of UE. The following goals were discussed with the patient and patient is in agreement with them as to be addressed in the treatment plan. The patient's rehab potential is Good. Patient's educational, spiritual, cultural needs were considered.       Goals:   Short Term Goals  Independent in home program of ROM in 2 visits  Fabricate static hand based orthotic for finger extension (ring and little fingers) - 2  visits  Patient with increase in ROM and decrease in pain allowing for increase use of right for to feed self with greater ease in 2 weeks  Patient with decrease in pain with palpation in 3 weeks  Patient able to use right with greater ease to bathe self in 3 weeks  Patient with increase in ROM and decrease in pain allowing for increase in use for folding of clothes in 4 weeks  Long Term Goals  Patient with decrease in pain and increase in motion and strength allowing for greater ease with meal preparation in 5 weeks  Patient able to use right to help operate lawnmower in 5 weeks  Patient able to perform sweeping and mopping with increase use of right for greater ease in performance in 6 weeks  Improve FOTO score by 15 points indicating improved function of right hand in 8 weeks  Plan   Outpatient Occupational Therapy 2 times weekly for 6 weeks then may decrease to 1 time a week for 2 weeks (will reassess periodically and adjust as needed) to include the following interventions: . Home Exercise and Stretching, Patient Education, Therapeutic Exercise (72782) - Improve muscle strength, ROM, flexibility and muscle function, Manual Stretching (40301) - Passive or Active stretching to improve muscle length and function, Soft Tissue Mobs (97662) - Increase ROM tissue length, joint mechanics and modulate pain, Cryotherapy (75997) - Application of cold to decrease local swelling and decrease pain , Heat (72025) -  Application of heat to increase local circulation and decrease pain, Ultrasound (39235) - Increase local circulation, improve tissue healing time, and modulate pain, Whirlpool/fluidotherapy (23529) - Increase local tissue circulation, improve elasticity of tissues, increased blood flow for improved muscle strength, ROM, flexibility and function, Therapeutic activities (97712) use of dynamic activities to improve functional performance, Kinesio taping.   Orthotic fabrication of hand based finger extension to be  worn 1 hour on off during the day and while sleeping to assist with gaining and maintaining finger extension and apply pressure if able to tolerate to help flatten scar.       NELY Hines

## 2022-08-31 ENCOUNTER — CLINICAL SUPPORT (OUTPATIENT)
Dept: REHABILITATION | Facility: HOSPITAL | Age: 67
End: 2022-08-31
Payer: MEDICARE

## 2022-08-31 DIAGNOSIS — Z98.890 HX OF HAND SURGERY: Primary | ICD-10-CM

## 2022-08-31 DIAGNOSIS — M72.0 DUPUYTREN'S CONTRACTURE OF RIGHT HAND: ICD-10-CM

## 2022-08-31 PROCEDURE — L3913 HFO W/O JOINTS CF: HCPCS | Mod: PN

## 2022-08-31 PROCEDURE — 97022 WHIRLPOOL THERAPY: CPT | Mod: PN

## 2022-08-31 NOTE — PROGRESS NOTES
[facility] Occupational Therapy Daily Treatment Note   Date: 8/31/2022  Name: Aleksandar Knight Sr.  Clinic Number: [id number]  Therapy Diagnosis:   Encounter Diagnoses   Name Primary?    Hx of hand surgery Yes    Dupuytren's contracture of right hand      Physician: Rafi Andre MD  Physician Orders: OT for RT hand, 2x week for 4 weeks   Medical Diagnosis: Z98.890 (ICD-10-CM) - Hx of hand surgery M72.0 (ICD-10-CM) - Dupuytren's contracture of right hand   Surgical Procedure and Date: 7/27/2022; Right small finger Dupuytren's release open  Evaluation Date: 8/29/2022  Insurance Authorization Period Expiration: 10/23/22  Plan of Care Certification Period: 10/30/22  Date of Return to MD: 9/06/22  Visit # / Visits authorized: Eval 1 / 1 2/12 (including eval)  Time In:11:00  Time Out: 11:45  Total Billable Time: 45 minutes   Precautions:  Standard     post op     Subjective   Pt reports: Better since yesterday. Did some exercise in the warm water yesterday. Felt better.    was compliant with home exercise program given    Response to previous treatment:pain and swelling  Functional change: none    Pain: 2/10  Location: right hand     Objective    Patient received the following treatment:     Supervised modalities after being cleared for contradictions for 10 minutes including:      Fluidotherapy to right hand for 10 minutes to increase tissue elasticity, desensitization, sensory re-education and for pain management. Air flow 80  heat 112 dgs    Therapeutic exercises to improve functional performance while increasing strength, endurance, ROM,  and flexibility  for 4  minutes, including:    -rolling of ball with hand to stretch into fingers extension holding for 30 seconds x 5 repetitions   Orthotic Fabrication: x 30 minutes fabricated hand based finger extension orthotic holding ring and little fingers in extension MPs and PIPs at ~10 dgs lag. Unable to extend fully. Orthotic fabricated to hold  fingers extended during scar formation and to apply stretch into extension. Patient demonstrated ability to don and doff independently.   Instruction and demonstration given of all introduced   Patient required moderate cuing for correct performance of exercise.    Home Exercises and Education Provided   Education provided:  - discussed insurance limitation  - Progress towards goals     Written Home Exercises Provided: Patient was fitted with a piece of silicone gel sheeting for scar management to be worn under the orthotic. He took a picture of the instructions and precautions and therapist reviewed them with him. Reviewed orthotic guidelines: care (clean with soap and water and keep away from heat source), wear (for 1 hour 3 times during the day and wear while sleeping), precautions (increase in swelling, pain and pressure areas). Patient to call therapist if any problems arise with orthotic for proper adjustments to be made.   Patient instructed to cont prior HEP. Exercises were reviewed and he was able to demonstrate them prior to the end of the session. he demonstrated good  understanding of the HEP provided.   See EMR under Patient Instructions for exercises provided prior visit.     Assessment   Patient noted increase in pain and swelling post eval but better the next day. He noted burning initially at scar when placed in fluidotherapy which resolved after a few minutes. He demonstrated slightly better finger extension. A hand based static finger extension orthotic was fabricated to be worn 1 hour 3 times a day and while sleeping. He was also fitted with a piece of silicone gel sheeting for scar management. Pt  will continue to benefit from skilled OT to further address pain and deficits in ROM and strength which are hindering ability to perform self care and IADLs. Patient's cultural,spiritual, and educational needs were considered    Goals:  Short Term Goals  Independent in home program of ROM in 2  visits-ongoing  Fabricate static hand based orthotic for finger extension (ring and little fingers) 2 visits-met  Patient with increase in ROM and decrease in pain allowing for increase use of right for to feed self with greater ease in 2 weeks  Patient with decrease in pain with palpation in 3 weeks  Patient able to use right with greater ease to bathe self in 3 weeks  Patient with increase in ROM and decrease in pain allowing for increase in use for folding of clothes in 4 weeks  Plan   Continue skilled therapy 2 times a week for 6 weeks then may decrease to 1 time a week for 2 weeks including therapeutic exercises and activities, manual therapy, and pain modalities, adjust orthotic as needed   next session: assess use of silicone gel sheeting and use of orthotic  joint blocking flexion and extension      NELY Hines

## 2022-09-06 ENCOUNTER — OFFICE VISIT (OUTPATIENT)
Dept: ORTHOPEDICS | Facility: CLINIC | Age: 67
End: 2022-09-06
Payer: MEDICARE

## 2022-09-06 VITALS
BODY MASS INDEX: 32.14 KG/M2 | HEIGHT: 66 IN | SYSTOLIC BLOOD PRESSURE: 141 MMHG | WEIGHT: 200 LBS | DIASTOLIC BLOOD PRESSURE: 72 MMHG

## 2022-09-06 DIAGNOSIS — Z98.890 HX OF HAND SURGERY: Primary | ICD-10-CM

## 2022-09-06 PROCEDURE — 3288F PR FALLS RISK ASSESSMENT DOCUMENTED: ICD-10-PCS | Mod: CPTII,S$GLB,, | Performed by: ORTHOPAEDIC SURGERY

## 2022-09-06 PROCEDURE — 3078F DIAST BP <80 MM HG: CPT | Mod: CPTII,S$GLB,, | Performed by: ORTHOPAEDIC SURGERY

## 2022-09-06 PROCEDURE — 3078F PR MOST RECENT DIASTOLIC BLOOD PRESSURE < 80 MM HG: ICD-10-PCS | Mod: CPTII,S$GLB,, | Performed by: ORTHOPAEDIC SURGERY

## 2022-09-06 PROCEDURE — 3077F PR MOST RECENT SYSTOLIC BLOOD PRESSURE >= 140 MM HG: ICD-10-PCS | Mod: CPTII,S$GLB,, | Performed by: ORTHOPAEDIC SURGERY

## 2022-09-06 PROCEDURE — 1160F RVW MEDS BY RX/DR IN RCRD: CPT | Mod: CPTII,S$GLB,, | Performed by: ORTHOPAEDIC SURGERY

## 2022-09-06 PROCEDURE — 3061F NEG MICROALBUMINURIA REV: CPT | Mod: CPTII,S$GLB,, | Performed by: ORTHOPAEDIC SURGERY

## 2022-09-06 PROCEDURE — 3066F PR DOCUMENTATION OF TREATMENT FOR NEPHROPATHY: ICD-10-PCS | Mod: CPTII,S$GLB,, | Performed by: ORTHOPAEDIC SURGERY

## 2022-09-06 PROCEDURE — 99024 PR POST-OP FOLLOW-UP VISIT: ICD-10-PCS | Mod: S$GLB,,, | Performed by: ORTHOPAEDIC SURGERY

## 2022-09-06 PROCEDURE — 3077F SYST BP >= 140 MM HG: CPT | Mod: CPTII,S$GLB,, | Performed by: ORTHOPAEDIC SURGERY

## 2022-09-06 PROCEDURE — 1101F PR PT FALLS ASSESS DOC 0-1 FALLS W/OUT INJ PAST YR: ICD-10-PCS | Mod: CPTII,S$GLB,, | Performed by: ORTHOPAEDIC SURGERY

## 2022-09-06 PROCEDURE — 1160F PR REVIEW ALL MEDS BY PRESCRIBER/CLIN PHARMACIST DOCUMENTED: ICD-10-PCS | Mod: CPTII,S$GLB,, | Performed by: ORTHOPAEDIC SURGERY

## 2022-09-06 PROCEDURE — 4010F PR ACE/ARB THEARPY RXD/TAKEN: ICD-10-PCS | Mod: CPTII,S$GLB,, | Performed by: ORTHOPAEDIC SURGERY

## 2022-09-06 PROCEDURE — 3008F BODY MASS INDEX DOCD: CPT | Mod: CPTII,S$GLB,, | Performed by: ORTHOPAEDIC SURGERY

## 2022-09-06 PROCEDURE — 3061F PR NEG MICROALBUMINURIA RESULT DOCUMENTED/REVIEW: ICD-10-PCS | Mod: CPTII,S$GLB,, | Performed by: ORTHOPAEDIC SURGERY

## 2022-09-06 PROCEDURE — 99024 POSTOP FOLLOW-UP VISIT: CPT | Mod: S$GLB,,, | Performed by: ORTHOPAEDIC SURGERY

## 2022-09-06 PROCEDURE — 1159F PR MEDICATION LIST DOCUMENTED IN MEDICAL RECORD: ICD-10-PCS | Mod: CPTII,S$GLB,, | Performed by: ORTHOPAEDIC SURGERY

## 2022-09-06 PROCEDURE — 3008F PR BODY MASS INDEX (BMI) DOCUMENTED: ICD-10-PCS | Mod: CPTII,S$GLB,, | Performed by: ORTHOPAEDIC SURGERY

## 2022-09-06 PROCEDURE — 1159F MED LIST DOCD IN RCRD: CPT | Mod: CPTII,S$GLB,, | Performed by: ORTHOPAEDIC SURGERY

## 2022-09-06 PROCEDURE — 3288F FALL RISK ASSESSMENT DOCD: CPT | Mod: CPTII,S$GLB,, | Performed by: ORTHOPAEDIC SURGERY

## 2022-09-06 PROCEDURE — 1126F AMNT PAIN NOTED NONE PRSNT: CPT | Mod: CPTII,S$GLB,, | Performed by: ORTHOPAEDIC SURGERY

## 2022-09-06 PROCEDURE — 3066F NEPHROPATHY DOC TX: CPT | Mod: CPTII,S$GLB,, | Performed by: ORTHOPAEDIC SURGERY

## 2022-09-06 PROCEDURE — 1101F PT FALLS ASSESS-DOCD LE1/YR: CPT | Mod: CPTII,S$GLB,, | Performed by: ORTHOPAEDIC SURGERY

## 2022-09-06 PROCEDURE — 1126F PR PAIN SEVERITY QUANTIFIED, NO PAIN PRESENT: ICD-10-PCS | Mod: CPTII,S$GLB,, | Performed by: ORTHOPAEDIC SURGERY

## 2022-09-06 PROCEDURE — 4010F ACE/ARB THERAPY RXD/TAKEN: CPT | Mod: CPTII,S$GLB,, | Performed by: ORTHOPAEDIC SURGERY

## 2022-09-06 NOTE — PROGRESS NOTES
[facility], formerly [facility]    Subjective:     Chief Complaint:   Chief Complaint   Patient presents with    Right Hand - Post-op Evaluation     PO Right Dupuytrens Release 07/27/22. PT follow up and wound check. Started PT last week.        Past Medical History:   Diagnosis Date    Dupuytren contracture 2022    Hyperlipidemia     Hypertension        Past Surgical History:   Procedure Laterality Date    COLONOSCOPY N/A 11/20/2019    Procedure: COLONOSCOPY;  Surgeon: Nabeel Hart MD;  Location: [facility] ENDO;  Service: Endoscopy;  Laterality: N/A;    DUPUYTREN CONTRACTURE RELEASE Right 7/27/2022    Procedure: RELEASE, DUPUYTREN CONTRACTURE;  Surgeon: Rafi Andre MD;  Location: [facility] OR;  Service: Orthopedics;  Laterality: Right;       Current Outpatient Medications   Medication Sig    amlodipine-benazepril 10-20mg (LOTREL) 10-20 mg per capsule Take 1 capsule by mouth once daily.    atorvastatin (LIPITOR) 40 MG tablet Take 1 tablet (40 mg total) by mouth once daily. For cholesterol    tamsulosin (FLOMAX) 0.4 mg Cap Take 1 capsule (0.4 mg total) by mouth once daily.    oxyCODONE-acetaminophen (PERCOCET) 7.5-325 mg per tablet Take 1 tablet by mouth every 4 (four) hours as needed for Pain. (Patient not taking: No sig reported)     No current facility-administered medications for this visit.       Review of patient's allergies indicates:  No Known Allergies    Family History   Problem Relation Age of Onset    Cancer Father         unkn type       Social History     Socioeconomic History    Marital status: Single   Tobacco Use    Smoking status: Never    Smokeless tobacco: Never   Substance and Sexual Activity    Alcohol use: Never    Drug use: Never       History of present illness: 67-year-old male returns to clinic today for his 3rd postop visit, he is status post a Dupuytren's contracture release of the right little finger.  This was done on July 27th.  We saw him in a follow-up on the 18th, he was having some pain.   His surgical site remains sensitive.  He still has difficulty grasping and holding objects.  Unfortunately there has been a delay with the paperwork on his physical therapy.  He only started physical therapy last week.  Scheduled for 8 weeks.    He has been applying some Neosporin to the wound to help soften some of the sloughing skin.  No copious bleeding, no redness, no purulent drainage.      Review of Systems:    Constitution: Negative for chills, fever, and sweats.  Negative for unexplained weight loss.    HENT:  Negative for headaches and blurry vision.    Cardiovascular:Negative for chest pain or irregular heart beat. Negative for hypertension.    Respiratory:  Negative for cough and shortness of breath.    Gastrointestinal: Negative for abdominal pain, heartburn, melena, nausea, and vomitting.    Genitourinary:  Negative bladder incontinence and dysuria.    Musculoskeletal:  See HPI for details.     Neurological: Negative for numbness.    Psychiatric/Behavioral: Negative for depression.  The patient is not nervous/anxious.      Endocrine: Negative for polyuria    Hematologic/Lymphatic: Negative for bleeding problem.  Does not bruise/bleed easily.    Skin: Negative for poor would healing and rash    Objective:      Physical Examination:    Vital Signs:    Vitals:    09/06/22 0726   BP: (!) 141/72       Body mass index is 32.28 kg/m².    This a well-developed, well nourished patient in no acute distress.  They are alert and oriented and cooperative to examination.        Examination of the right hand, was healed surgical incision.  Still has a mild flexion contracture of the right little finger.  He cannot make a close fist.  He has within 1/2-1 cm.  Extremely sensitive or tender to palpation over the surgical incision will small dog ear and the metacarpophalangeal joint crease.    Pertinent New Results:    XRAY Report / Interpretation:       Assessment/Plan:      Stable status post Dupuytren contracture  "release of the right little finger.  Will need some aggressive physical therapy for range of motion.  Continue topicals for desensitization.  Follow up 4 weeks.    Dieter Zhang, Physician Assistant, served in the capacity as a "scribe" for this patient encounter.  A "face-to-face" encounter occurred with Dr. Rafi Andre on this date.  The treatment plan and medical decision-making is outlined above. Patient was seen and examined with a chaperone.       This note was created using Dragon voice recognition software that occasionally misinterpreted phrases or words.          "

## 2022-09-07 ENCOUNTER — CLINICAL SUPPORT (OUTPATIENT)
Dept: REHABILITATION | Facility: HOSPITAL | Age: 67
End: 2022-09-07
Payer: MEDICARE

## 2022-09-07 DIAGNOSIS — Z98.890 HX OF HAND SURGERY: Primary | ICD-10-CM

## 2022-09-07 PROCEDURE — 97530 THERAPEUTIC ACTIVITIES: CPT | Mod: PN

## 2022-09-07 PROCEDURE — 97022 WHIRLPOOL THERAPY: CPT | Mod: PN

## 2022-09-07 PROCEDURE — 97110 THERAPEUTIC EXERCISES: CPT | Mod: PN

## 2022-09-07 NOTE — PROGRESS NOTES
[facility] Occupational Therapy Daily Treatment Note   Date: 9/7/2022  Name: Aleksandar Knight Sr.  Clinic Number: [id number]  Therapy Diagnosis:   Encounter Diagnosis   Name Primary?    Hx of hand surgery Yes     Physician: Rafi Andre MD  Physician Orders: OT for RT hand, 2 x week for 4 weeks   Medical Diagnosis: Z98.890 (ICD-10-CM) - Hx of hand surgery M72.0 (ICD-10-CM) - Dupuytren's contracture of right hand   Surgical Procedure and Date: 7/27/2022; Right small finger Dupuytren's release open  Evaluation Date: 8/29/2022  Insurance Authorization Period Expiration: 10/23/22  Plan of Care Certification Period: 10/30/22  Date of Return to MD: 9/06/22  Visit # / Visits authorized: Eval 1 / 1    3/12 (including eval)  Time In:6:55  Time Out: 7:45  Total Billable Time: 50 minutes   Precautions:  Standard     post op     Subjective   Pt reports: Wearing the splint at least 12 hours a day. Can't wear it all night, start to feel like it cramps up. So have to take it off for a while then put it back on about 1 hr or so later. The gel thing didn't seem to be helping and had it trouble with it staying in place. It feels so much better to move in the heat. It still hurts to try to grab anything; bending the fingers and contact along the scar.   was compliant with home exercise program given    Response to previous treatment: sore only at first  Functional change: none  Pain: 2/10  Location: right hand      Objective    Patient received the following treatment:   Supervised modalities after being cleared for contradictions for 10 minutes including:      Fluidotherapy to right hand for 10 minutes to increase tissue elasticity, desensitization, sensory re-education and for pain management. Air flow 80  heat 112 dgs   Manual therapy techniques to increase joint mobilization and soft tissue mobilization for 4 minutes including:    -Scar massage to ulnar volar aspect of hand for 4 minutes  to decrease dense  "adhesions and improve tensile glide.   Therapeutic activities to improve functional performance with use of dynamic activities for 8 minutes  including:  --ADDED: highlighter roll 20 repetitions   -ADDED: in hand translation of 1" diameter round bead x 10 repetitions   ADDED: flicking of ring and little fingers 10 repetitions each   Therapeutic exercises to improve functional performance while increasing strength, endurance, ROM,  and flexibility  for 38  minutes, including:  Active assistive/Passive range of motion of each joint into flexion and extension and composite of each  -therapist holding of MPs in flexion as patient actively fisted x 5 repetitions   -fingers extension off table top isolated and composite 10 repetitions each  -little finger abduction/adduction 10 repetitions   ADDED: 1# wrist cuff weight wrapped around hand  extension, flexion and deviation 15 repetitions    Instruction and demonstration given of all introduced   Patient required moderate cuing for correct performance of exercise.    Home Exercises and Education Provided   Education provided:  - discussed insurance limitation  - Progress towards goals     Written Home Exercises Provided: 9/07/22 Added: in hand manipulation of objects ~ 2 minutes and flicking of ring and little fingers as above 10 repetitions each 2 times a day Patient was fitted with a piece of silicone gel sheeting for scar management to be worn under the orthotic. He took a picture of the instructions and precautions and therapist reviewed them with him. Reviewed orthotic guidelines: care (clean with soap and water and keep away from heat source), wear (for 1 hour 3 times during the day and wear while sleeping), precautions (increase in swelling, pain and pressure areas). Patient to call therapist if any problems arise with orthotic for proper adjustments to be made.   Patient instructed to cont prior HEP. Exercises were reviewed and he was able to demonstrate them prior " to the end of the session. he demonstrated good  understanding of the HEP provided.   See EMR under Patient Instructions for exercises provided prior visit.     Assessment   Patient noted hypersensitivity is lessening, however, he still jumped several times with scar management and noted muscle cramping several times during the session. It felt like there was resistance to MPs flexion at long and little fingers.  Composite little passive flexion did achieve to palm 1 time during the session. He also was able to fully adduct his little finger with mush effort-unable previously. Overall it appears that his extension and flexion is improving. He is achieving ~ 50-75%  of a fist. He did not have success with gel sheeting for scar management. Pt  will continue to benefit from skilled OT to further address pain and deficits in ROM and strength which are hindering ability to perform self care and IADLs. Patient's cultural,spiritual, and educational needs were considered    Goals:  Short Term Goals  Independent in home program of ROM in 2 visits-ongoing  Fabricate static hand based orthotic for finger extension (ring and little fingers) 2 visits-met  Patient with increase in ROM and decrease in pain allowing for increase use of right for to feed self with greater ease in 2 weeks  Patient with decrease in pain with palpation in 3 weeks  Patient able to use right with greater ease to bathe self in 3 weeks  Patient with increase in ROM and decrease in pain allowing for increase in use for folding of clothes in 4 weeks  Plan   Continue skilled therapy 2 times a week for 6 weeks then may decrease to 1 time a week for 2 weeks including therapeutic exercises and activities, manual therapy, and pain modalities, adjust orthotic as needed   next session: in hand manipulation to lace bead and fingers flexion to palm to remove    NELY Hines

## 2022-09-08 NOTE — PROGRESS NOTES
[facility] Occupational Therapy Daily Treatment Note   Date: 9/9/2022  Name: Aleksandar Knight Sr.  Clinic Number: [id number]  Therapy Diagnosis: Right hand pain and stiffness  Physician: Rafi Andre MD  Physician Orders: OT for RT hand, 2 x week for 4 weeks   Medical Diagnosis: Z98.890 (ICD-10-CM) - Hx of hand surgery M72.0 (ICD-10-CM) - Dupuytren's contracture of right hand   Surgical Procedure and Date: 7/27/2022; Right small finger Dupuytren's release open  Evaluation Date: 8/29/2022  Insurance Authorization Period Expiration: 10/23/22  Plan of Care Certification Period: 10/30/22  Date of Return to MD: 9/06/22  Visit # / Visits authorized: Eval 1 / 1 4/12 (including eval)  Time In:7:54  Time Out: 8:50  Total Billable Time: 56 minutes   Precautions:  Standard     post op     Subjective   Pt reports: Just stiff this morning. Was very sore when I woke up close to a 10/10. Wore splint last night for 5 hours straight. Can't really tell where my little finger is. Still numb and tingles when something touches it. Ring finger used to be the same way but getting better.    was compliant with home exercise program given    Response to previous treatment: you made it feel better after the heat and massage  Functional change: none  Pain: 5/10 at end of session  Location: right hand      Objective    Patient received the following treatment:   Supervised modalities after being cleared for contradictions for 10 minutes including:      Fluidotherapy to right hand for 10 minutes to increase tissue elasticity, desensitization, sensory re-education and for pain management. Air flow 80  heat 112 dgs   Manual therapy techniques to increase joint mobilization and soft tissue mobilization for 6 minutes including:    -Scar massage to ulnar volar aspect of hand for 6 minutes  to decrease dense adhesions and improve tensile glide.   Therapeutic activities to improve functional performance with use of dynamic  "activities for 10 minutes  including:  ADDED- in hand manipulation of 2 1/2" beads at a time (total of 12) as laced bead and alternated ring and little fingers flexion to palm to remove from lace  -flicking of ring and little fingers 10 repetitions each to hit foam letter   Therapeutic exercises to improve functional performance while increasing strength, endurance, ROM,  and flexibility  for 30  minutes, including:  Active assistive/Passive range of motion of each joint (all fingers) into flexion and extension and composite of each  -therapist holding of MPs in flexion as patient actively fisted x 5 repetitions   -fingers extension off table top isolated and composite 5 repetitions each  -little finger abduction/adduction 10 repetitions attempted intrinsic squeezes 4th web space-2 repetitions discontinued due to pain  -1# wrist cuff weight wrapped   extension, flexion  20 repetitions   -Yellow theraputty  gripping x 2 minutes   Instruction and demonstration given of all introduced   Patient required moderate cuing for correct performance of exercise.    Home Exercises and Education Provided   Education provided:  - discussed insurance limitation  - Progress towards goals     Written Home Exercises Provided: 9/07/22 Added: in hand manipulation of objects ~ 2 minutes and flicking of ring and little fingers as above 10 repetitions each 2 times a day Patient was fitted with a piece of silicone gel sheeting for scar management to be worn under the orthotic. He took a picture of the instructions and precautions and therapist reviewed them with him. Reviewed orthotic guidelines: care (clean with soap and water and keep away from heat source), wear (for 1 hour 3 times during the day and wear while sleeping), precautions (increase in swelling, pain and pressure areas). Patient to call therapist if any problems arise with orthotic for proper adjustments to be made.   Patient instructed to cont prior HEP. Exercises were " reviewed and he was able to demonstrate them prior to the end of the session. he demonstrated good  understanding of the HEP provided.   See EMR under Patient Instructions for exercises provided prior visit.     Assessment   Patient noting stiffness in am. Patient achieving ~ 50-75% of a fist. Therapist was able to touch all fingers to palm with passive motion today. Patient unable to hold ring and little to palm once placed. He continues with numbness at little finger-unable to tell where it is. He is able to fully abduct little finger now but unable to tolerate any resistance due to pain. He was able to tolerate gripping of soft theraputty and hold cuff weight instead of wrapping it around his hand today. He continues with little use of right hand for self care and daily activities. Pt  will continue to benefit from skilled OT to further address pain and deficits in ROM and strength which are hindering ability to perform self care and IADLs. Patient's cultural,spiritual, and educational needs were considered    Goals:  Short Term Goals  Independent in home program of ROM in 2 visits-met  Fabricate static hand based orthotic for finger extension (ring and little fingers) 2 visits-met  Patient with increase in ROM and decrease in pain allowing for increase use of right for to feed self with greater ease in 2 weeks-ongoing  Patient with decrease in pain with palpation in 3 weeks-ongoing  Patient able to use right with greater ease to bathe self in 3 weeks-ongoing  Patient with increase in ROM and decrease in pain allowing for increase in use for folding of clothes in 4 weeks-ongoing  Plan   Continue skilled therapy 2 times a week for 4 weeks then may decrease to 1 time a week for 2 weeks including therapeutic exercises and activities, manual therapy, and pain modalities, adjust orthotic as needed   next session: lifting of cuff weights to stool on table top    NELY Hines

## 2022-09-09 ENCOUNTER — CLINICAL SUPPORT (OUTPATIENT)
Dept: REHABILITATION | Facility: HOSPITAL | Age: 67
End: 2022-09-09
Payer: MEDICARE

## 2022-09-09 DIAGNOSIS — Z98.890 HX OF HAND SURGERY: Primary | ICD-10-CM

## 2022-09-09 PROCEDURE — 97110 THERAPEUTIC EXERCISES: CPT | Mod: PN

## 2022-09-09 PROCEDURE — 97022 WHIRLPOOL THERAPY: CPT | Mod: PN

## 2022-09-09 PROCEDURE — 97530 THERAPEUTIC ACTIVITIES: CPT | Mod: PN

## 2022-09-13 ENCOUNTER — CLINICAL SUPPORT (OUTPATIENT)
Dept: REHABILITATION | Facility: HOSPITAL | Age: 67
End: 2022-09-13
Payer: MEDICARE

## 2022-09-13 DIAGNOSIS — Z98.890 HX OF HAND SURGERY: Primary | ICD-10-CM

## 2022-09-13 PROCEDURE — 97110 THERAPEUTIC EXERCISES: CPT | Mod: PN

## 2022-09-13 PROCEDURE — 97022 WHIRLPOOL THERAPY: CPT | Mod: PN

## 2022-09-13 PROCEDURE — 97530 THERAPEUTIC ACTIVITIES: CPT | Mod: PN

## 2022-09-13 NOTE — PROGRESS NOTES
[facility] Daily Treatment Note   Date: 9/13/2022  Name: Aleksandar Knight Sr.  Clinic Number: [id number]  Therapy Diagnosis: Right hand pain and stiffness  Physician: Rafi Andre MD  Physician Orders: OT for RT hand, 2 x week for 4 weeks   Medical Diagnosis: Z98.890 (ICD-10-CM) - Hx of hand surgery M72.0 (ICD-10-CM) - Dupuytren's contracture of right hand   Surgical Procedure and Date: 7/27/2022; Right small finger Dupuytren's release open  Evaluation Date: 8/29/2022  Insurance Authorization Period Expiration: 10/23/22  Plan of Care Certification Period: 10/30/22  Date of Return to MD: 10/04/22  Visit # / Visits authorized: Eval 1 / 1 5/12 (including eval)  Time In:7:00  Time Out: 7:53  Total Billable Time: 53 minutes   Precautions:  Standard     post op     Subjective   Pt reports: It's stiff since I just took off the splint. The middle finger gets really sore when I wear the splint; ... now up to 8 hours at a time (sleeping). It's doing better. Able to use it more to help with self care. Not as quite hypersensitive. You always help me.   was compliant with home exercise program given    Response to previous treatment: you made it feel better after the heat and massage  Functional change: none  Pain:   Location: right hand      Objective    A/PROM                                       RIGHT                                         8/29/22                    INDEX      LONG      RING           LITTLE                 MCP Ext-Flexion       0-60        0-45      15-40/0-70     30-40/10-75  PIP Ext-Flexion         0-75        0-50      15-70/0-75     35-50/20-55  DIP Ext-Flexion         0-50        0-60       0-50 /NT         0-65/NT  AROM  9/13/22                     INDEX    LONG      RING     LITTLE                 MCP Ext-Flexion       0-50        0-60        10-55      10-60  PIP Ext-Flexion         0-90       10-95       20-80      20-70  DIP Ext-Flexion         0-50  "       0-70         0-55        0-65    Patient received the following treatment:   Supervised modalities after being cleared for contradictions for 10 minutes including:      Fluidotherapy to right hand for 10 minutes to increase tissue elasticity, desensitization, sensory re-education and for pain management. Air flow 80  heat 112 degrees   Manual therapy techniques to increase joint mobilization and soft tissue mobilization for 5 minutes including:    -Scar massage to ulnar volar aspect of hand for 5 minutes  to decrease dense adhesions and improve tensile glide.   Therapeutic activities to improve functional performance with use of dynamic activities for 8  minutes  including:  - in hand manipulation of 2 1/2" beads at a time (total of 12) as laced bead and alternated ring and little fingers flexion to palm to remove from lace  - highlighter roll 20 repetitions    Therapeutic exercises to improve functional performance while increasing strength, endurance, ROM, and flexibility for 30  minutes, including:  Active assistive/Passive range of motion of each joint (all fingers) into flexion and extension and composite of each  -therapist holding of MPs in flexion as patient actively fisted x 5 repetitions   -fingers extension off table top isolated and composite 5 repetitions each  -little finger abduction/adduction 10 repetitions  -1# wrist cuff weight wrapped   extension, flexion  20 repetitions   -Yellow theraputty rolling out to stretch into extension then gripping x 2 minutes   Instruction and demonstration given of all introduced   Patient required moderate cuing for correct performance of exercise.    Home Exercises and Education Provided   Education provided:  - discussed insurance limitation  - Progress towards goals     Written Home Exercises Provided: 9/07/22 Added: in hand manipulation of objects ~ 2 minutes and flicking of ring and little fingers as above 10 repetitions each 2 times a day. Patient was " fitted with a piece of silicone gel sheeting for scar management to be worn under the orthotic. He took a picture of the instructions and precautions and therapist reviewed them with him. Reviewed orthotic guidelines: care (clean with soap and water and keep away from heat source), wear (for 1 hour 3 times during the day and wear while sleeping), precautions (increase in swelling, pain and pressure areas). Patient to call therapist if any problems arise with orthotic for proper adjustments to be made.   Patient instructed to cont prior HEP. Exercises were reviewed and he was able to demonstrate them prior to the end of the session. he demonstrated good  understanding of the HEP provided.   See EMR under Patient Instructions for exercises provided prior visit.     Assessment   Patient reporting and observed a decrease in hypersensitivity; less jumping and reports of pain with massage. Patient exhibiting an increase in Active range of motion of up to 20 degrees in extension and 45 degrees in flexion of finger joints with minimal to moderate limitations persisting. Passively was able to bring each fingertip to distal palmar crease by end of session. He reports increase in use of right hand for self care and daily activities. Pt  will continue to benefit from skilled OT to further address pain and deficits in ROM and strength which are hindering ability to perform self care and IADLs. Patient's cultural,spiritual, and educational needs were considered  Goals:  Short Term Goals  Patient with increase in ROM and decrease in pain allowing for increase use of right for to feed self with greater ease in 2 weeks-ongoing  Patient with decrease in pain with palpation in 3 weeks-ongoing  Patient able to use right with greater ease to bathe self in 3 weeks-ongoing  Patient with increase in ROM and decrease in pain allowing for increase in use for folding of clothes in 4 weeks-ongoing  Plan   Continue skilled therapy 2 times a  week for 3 weeks then may decrease to 1 time a week for 2 weeks including therapeutic exercises and activities, manual therapy, and pain modalities, adjust orthotic as needed   next session: lifting of cuff weights to stool on table top    NELY Hines

## 2022-09-15 ENCOUNTER — CLINICAL SUPPORT (OUTPATIENT)
Dept: REHABILITATION | Facility: HOSPITAL | Age: 67
End: 2022-09-15
Payer: MEDICARE

## 2022-09-15 DIAGNOSIS — Z98.890 HX OF HAND SURGERY: Primary | ICD-10-CM

## 2022-09-15 PROCEDURE — 97140 MANUAL THERAPY 1/> REGIONS: CPT | Mod: PN

## 2022-09-15 PROCEDURE — 97022 WHIRLPOOL THERAPY: CPT | Mod: PN

## 2022-09-15 PROCEDURE — 97110 THERAPEUTIC EXERCISES: CPT | Mod: PN

## 2022-09-15 PROCEDURE — 97530 THERAPEUTIC ACTIVITIES: CPT | Mod: PN

## 2022-09-15 NOTE — PROGRESS NOTES
[facility] Occupational Therapy Daily Treatment Note   Date: 9/15/2022  Name: Aleksandar Knight .  Clinic Number: [id number]  Therapy Diagnosis: Right hand pain and stiffness  Physician: Rafi Andre MD  Physician Orders: OT for RT hand, 2 x week for 4 weeks   Medical Diagnosis: Z98.890 (ICD-10-CM) - Hx of hand surgery M72.0 (ICD-10-CM) - Dupuytren's contracture of right hand   Surgical Procedure and Date: 7/27/2022; Right small finger Dupuytren's release open  Evaluation Date: 8/29/2022  Insurance Authorization Period Expiration: 10/23/22  Plan of Care Certification Period: 10/30/22  Date of Return to MD: 10/04/22  Visit # / Visits authorized: Brandin 1 / 1 6/12 (including eval)  Time In:6:54  Time Out: 7:50  Total Billable Time: 56 minutes   Precautions:  Standard     post op     Subjective   Pt reports:  Pain this morning 7/10  was down to 4/10     was compliant with home exercise program given    Response to previous treatment: you made it feel better after the heat and massage  Functional change: none  Pain: as above  Location: right hand   FOTO score: Eval 8/29  36%  Goal 68%;  9/15 42 %     Objective    Patient received the following treatment:   Supervised modalities after being cleared for contradictions for 10 minutes including:      Fluidotherapy to right hand for 10 minutes to increase tissue elasticity, desensitization, sensory re-education and for pain management. Air flow 80  heat 112 degrees   Manual therapy techniques to increase joint mobilization and soft tissue mobilization for 8 minutes including:    -Scar massage to ulnar volar aspect of hand for 8 minutes  to decrease dense adhesions and improve tensile glide.   Therapeutic activities to improve functional performance with use of dynamic activities for 8  minutes  including:  -therapist placed glass beads (total of 30) at palm as patient flexed ring and little fingers alternately to retrieve and place on table top  -  "decreased size to 1/2" dowel from highlighter roll 20 repetitions    Therapeutic exercises to improve functional performance while increasing strength, endurance, ROM, and flexibility for 30  minutes, including:  Active assistive/Passive range of motion of each joint (all fingers) into flexion and extension and composite of each  -therapist holding of MPs in flexion as patient actively fisted x 5 repetitions   -fingers extension off table top isolated and composite 5 repetitions each  -little finger abduction/adduction 10 repetitions  -1# barbell (advanced from weight wrapped around hand wrist)  extension, deviation, flexion  20 repetitions each  -Yellow theraputty rolling out to stretch into extension then gripping x 2 minutes   Instruction and demonstration given of all introduced   Patient required moderate cuing for correct performance of exercise.    Home Exercises and Education Provided   Education provided:  - discussed insurance limitation  - Progress towards goals     Written Home Exercises Provided: 9/07/22 Added: in hand manipulation of objects ~ 2 minutes and flicking of ring and little fingers as above 10 repetitions each 2 times a day. Patient was fitted with a piece of silicone gel sheeting for scar management to be worn under the orthotic. He took a picture of the instructions and precautions and therapist reviewed them with him. Reviewed orthotic guidelines: care (clean with soap and water and keep away from heat source), wear (for 1 hour 3 times during the day and wear while sleeping), precautions (increase in swelling, pain and pressure areas). Patient to call therapist if any problems arise with orthotic for proper adjustments to be made.   Patient instructed to cont prior HEP. Exercises were reviewed and he was able to demonstrate them prior to the end of the session. he demonstrated good  understanding of the HEP provided.   See EMR under Patient Instructions for exercises provided prior visit. "     Assessment   Patient with increase in pain today at little proximal phalanx. He was able to perform finger flexion exercise with smaller objects. He held onto barbell however, if incorporated little finger he had more difficulty with performing;observed trembling of the hand noted. . Pt  will continue to benefit from skilled OT to further address pain and deficits in ROM and strength which are hindering ability to perform self care and IADLs. Patient's cultural,spiritual, and educational needs were considered  Goals:  Short Term Goals  Patient with increase in ROM and decrease in pain allowing for increase use of right for to feed self with greater ease in 2 weeks-ongoing  Patient with decrease in pain with palpation in 3 weeks-ongoing  Patient able to use right with greater ease to bathe self in 3 weeks-ongoing  Patient with increase in ROM and decrease in pain allowing for increase in use for folding of clothes in 4 weeks-ongoing  Plan   Continue skilled therapy 2 times a week for 3 weeks then may decrease to 1 time a week for 2 weeks including therapeutic exercises and activities, manual therapy, and pain modalities, adjust orthotic as needed   next session: lifting of cuff weights to stool on table top; patient to bring in splint to see if modification needed-increase extension at fingers if able to tolerate    NELY Hines

## 2022-09-27 ENCOUNTER — CLINICAL SUPPORT (OUTPATIENT)
Dept: REHABILITATION | Facility: HOSPITAL | Age: 67
End: 2022-09-27
Payer: MEDICARE

## 2022-09-27 DIAGNOSIS — Z98.890 HX OF HAND SURGERY: Primary | ICD-10-CM

## 2022-09-27 PROCEDURE — 97110 THERAPEUTIC EXERCISES: CPT | Mod: PN

## 2022-09-27 PROCEDURE — 97530 THERAPEUTIC ACTIVITIES: CPT | Mod: PN

## 2022-09-27 PROCEDURE — 97022 WHIRLPOOL THERAPY: CPT | Mod: PN

## 2022-09-27 NOTE — PROGRESS NOTES
[facility] Occupational Therapy Daily Treatment Note   Date: 9/27/2022  Name: Aleksandar Knight Sr.  Clinic Number: [id number]  Therapy Diagnosis: Right hand pain and stiffness  Physician: Rafi Andre MD  Physician Orders: OT for RT hand, 2 x week for 4 weeks   Medical Diagnosis: Z98.890 (ICD-10-CM) - Hx of hand surgery M72.0 (ICD-10-CM) - Dupuytren's contracture of right hand   Surgical Procedure and Date: 7/27/2022; Right small finger Dupuytren's release open  Evaluation Date: 8/29/2022  Insurance Authorization Period Expiration: 10/23/22  Plan of Care Certification Period: 10/30/22  Date of Return to MD: 10/04/22  Visit # / Visits authorized: Brandin 1 / 1 7/12 (including eval)  Time In:6:50  Time Out: 7:45  Total Billable Time: 55 minutes   Precautions:  Standard     post op     Subjective   Pt reports:  Able to hold onto cane while I walk. At first couldn't even get my fingers around it.    was compliant with home exercise program given    Response to previous treatment:  better   Functional change: as above  Pain:   Location: right hand   FOTO score: Eval 8/29  36%  Goal 68%;  9/15 42 %     Objective    Patient received the following treatment:   Supervised modalities after being cleared for contradictions for 10 minutes including:      Fluidotherapy to right hand for 10 minutes to increase tissue elasticity, desensitization, sensory re-education and for pain management. Air flow 80  heat 112 degrees   Manual therapy techniques to increase joint mobilization and soft tissue mobilization for 7 minutes including:    -Scar massage to ulnar volar aspect of hand for 7 minutes  to decrease dense adhesions and improve tensile glide.   Therapeutic activities to improve functional performance with use of dynamic activities for 8  minutes  including:  -therapist placed glass beads (total of 30) at palm as patient flexed ring and little fingers alternately to retrieve and place on table  "top  ADDED:-RED theraputty cookie cutting with 1" cardboard dowel x 25 repetitions then gripping with ulnar side of hand as pulled up towards ceiling x 10 repetitions       Therapeutic exercises to improve functional performance while increasing strength, endurance, ROM, and flexibility for 30  minutes, including:  Active assistive/Passive range of motion of each joint (all fingers) into flexion and extension and composite of each  -therapist holding of MPs in flexion as patient actively fisted x 5 repetitions   -fingers extension off table top isolated and composite 5 repetitions each  -little finger abduction/adduction 10 repetitions  -2# barbell (increased by 1#)   extension, deviation, flexion  20 repetitions each  -RED(increased by 1 level of resistance) of theraputty rolling out to stretch into extension then gripping x 2 minutes     Instruction and demonstration given of all introduced   Patient required moderate cuing for correct performance of exercise.    Home Exercises and Education Provided   Education provided:  - discussed insurance limitation  - Progress towards goals     Written Home Exercises Provided: 9/07/22 Added: in hand manipulation of objects ~ 2 minutes and flicking of ring and little fingers as above 10 repetitions each 2 times a day. Patient was fitted with a piece of silicone gel sheeting for scar management to be worn under the orthotic. He took a picture of the instructions and precautions and therapist reviewed them with him. Reviewed orthotic guidelines: care (clean with soap and water and keep away from heat source), wear (for 1 hour 3 times during the day and wear while sleeping), precautions (increase in swelling, pain and pressure areas). Patient to call therapist if any problems arise with orthotic for proper adjustments to be made.   Patient instructed to cont prior HEP. Exercises were reviewed and he was able to demonstrate them prior to the end of the session. he demonstrated " good  understanding of the HEP provided.   See EMR under Patient Instructions for exercises provided prior visit.     Assessment   Patient noting increase in ability to flex fingers. He was able to tolerate increase in weight for wrist exercise and was able to maintain little finger on weight. He also was able tolerate passive fist to touch palm and maintain hold, unable previously. Tenderness decreasing but still significant at most proximal end of palmar scar.  Pt  will continue to benefit from skilled OT to further address pain and deficits in ROM and strength which are hindering ability to perform self care and IADLs. Patient's cultural,spiritual, and educational needs were considered  Goals:  Short Term Goals  Patient with increase in ROM and decrease in pain allowing for increase use of right for to feed self with greater ease in 2 weeks-met  Patient with decrease in pain with palpation in 3 weeks-ongoing  Patient able to use right with greater ease to bathe self in 3 weeks-met  Patient with increase in ROM and decrease in pain allowing for increase in use for folding of clothes in 4 weeks-met  Plan   Continue skilled therapy 2 times a week for 3 weeks then may decrease to 1 time a week for 2 weeks including therapeutic exercises and activities, manual therapy, and pain modalities, adjust orthotic as needed   next session: lifting of cuff weights to stool on table top; patient to bring in splint to see if modification needed-increase extension at fingers if able to tolerate    NELY Hines

## 2022-09-29 ENCOUNTER — CLINICAL SUPPORT (OUTPATIENT)
Dept: REHABILITATION | Facility: HOSPITAL | Age: 67
End: 2022-09-29
Payer: MEDICARE

## 2022-09-29 DIAGNOSIS — Z98.890 HX OF HAND SURGERY: Primary | ICD-10-CM

## 2022-09-29 PROCEDURE — 97110 THERAPEUTIC EXERCISES: CPT | Mod: PN

## 2022-09-29 PROCEDURE — 97022 WHIRLPOOL THERAPY: CPT | Mod: PN

## 2022-09-29 PROCEDURE — 97530 THERAPEUTIC ACTIVITIES: CPT | Mod: PN

## 2022-09-29 NOTE — PROGRESS NOTES
[facility] Occupational Therapy Daily Treatment Note   Date: 9/29/2022  Name: Aleskandar Knight Sr.  Clinic Number: [id number]  Therapy Diagnosis: Right hand pain and stiffness  Physician: Rafi Andre MD  Physician Orders: OT for RT hand, 2 x week for 4 weeks   Medical Diagnosis: Z98.890 (ICD-10-CM) - Hx of hand surgery M72.0 (ICD-10-CM) - Dupuytren's contracture of right hand   Surgical Procedure and Date: 7/27/2022; Right small finger Dupuytren's release open  Evaluation Date: 8/29/2022  Insurance Authorization Period Expiration: 10/23/22  Plan of Care Certification Period: 10/30/22  Date of Return to MD: 10/04/22  Visit # / Visits authorized: Brandin 1 / 1 8/12 (including eval)  Time In:6:50  Time Out: 7:45  Total Billable Time: 55 minutes   Precautions:  Standard     post op     Subjective   Pt reports: Hurts in the tip of the finger when you try to straighten it.      was compliant with home exercise program given    Response to previous treatment:  better   Functional change: as above  Pain: 3/10 on average  Location: right hand   FOTO score: Eval 8/29  36%  Goal 68%;  9/15 42 %     Objective    Hand -  Strength  Jaymar dynamometer 2nd Rung in lbs;  Pinches:pinch gauge average of 2 trials in psi    9/29/22 9/29/22   Position Right Left   Hand : Trial 1 40 65   Hand : Trial 2 32 65   Hand : Trial 3 36 55    Average 36 61   Patient received the following treatment:   Supervised modalities after being cleared for contradictions for 10 minutes including:      Fluidotherapy to right hand for 10 minutes to increase tissue elasticity, desensitization, sensory re-education and for pain management. Air flow 80  heat 112 degrees   Manual therapy techniques to increase joint mobilization and soft tissue mobilization for 7 minutes including:    -Scar massage to ulnar volar aspect of hand for 7 minutes  to decrease dense adhesions and improve tensile glide.   Therapeutic  "activities to improve functional performance with use of dynamic activities for 8  minutes  including:  -in hand manipulation of beads 1/2" at a time and placed on table top x 20 beads  -RED theraputty cookie cutting with 1" cardboard dowel x 25 repetitions then gripping with ulnar side of hand as pulled up towards ceiling x 10 repetitions       Therapeutic exercises to improve functional performance while increasing strength, endurance, ROM, and flexibility for 30  minutes, including:  Active assistive/Passive range of motion of each joint (all fingers) into flexion and extension and composite of each  -therapist holding of MPs in flexion as patient actively fisted x 5 repetitions   -fingers extension off table top isolated and composite 5 repetitions each  -little finger abduction/adduction 10 repetitions  -2# barbell  extension, deviation, flexion  20 repetitions each  -RED of theraputty rolling out to stretch into extension then gripping x 2 minutes     Instruction and demonstration given of all introduced   Patient required moderate cuing for correct performance of exercise.    Home Exercises and Education Provided   Education provided:  - discussed insurance limitation  - Progress towards goals     Written Home Exercises Provided: 9/07/22 Added: in hand manipulation of objects ~ 2 minutes and flicking of ring and little fingers as above 10 repetitions each 2 times a day. Patient was fitted with a piece of silicone gel sheeting for scar management to be worn under the orthotic. He took a picture of the instructions and precautions and therapist reviewed them with him. Reviewed orthotic guidelines: care (clean with soap and water and keep away from heat source), wear (for 1 hour 3 times during the day and wear while sleeping), precautions (increase in swelling, pain and pressure areas). Patient to call therapist if any problems arise with orthotic for proper adjustments to be made.   Patient instructed to cont " prior HEP. Exercises were reviewed and he was able to demonstrate them prior to the end of the session. he demonstrated good  understanding of the HEP provided.   See EMR under Patient Instructions for exercises provided prior visit.     Assessment   Patient with decreasing frequency of pain but still high with electrical sharp pain; on average 3/10. His passive flexion has improved to being able to touch distal palmar crease with all fingers. He is tolerating massage much better; most sensitivity at most proximal aspect of scar and noting electrical shock in little finger medial aspect. Scar is becoming much more pliable and less rigid. Pt  will continue to benefit from skilled OT to further address pain and deficits in ROM and strength which are hindering ability to perform self care and IADLs. Patient's cultural,spiritual, and educational needs were considered  Goals:  Short Term Goals  Patient with increase in ROM and decrease in pain allowing for increase use of right for to feed self with greater ease in 2 weeks-met  Patient with decrease in pain with palpation in 3 weeks-ongoing  Patient able to use right with greater ease to bathe self in 3 weeks-met  Patient with increase in ROM and decrease in pain allowing for increase in use for folding of clothes in 4 weeks-met  Long Term Goals  Patient with decrease in pain and increase in motion and strength allowing for greater ease with meal preparation in 5 weeks-ongoing  Patient able to use right to help operate lawnmower in 5 weeks-ongoing  Patient able to perform sweeping and mopping with increase use of right for greater ease in performance in 6 weeks-ongoing  Improve FOTO score by 15 points indicating improved function of right hand in 8 weeks-ongoing  Plan   Continue skilled therapy 2 times a week for 3 weeks then may decrease to 1 time a week for 2 weeks including therapeutic exercises and activities, manual therapy, and pain modalities, adjust orthotic as  needed   next session: lifting of cuff weights to stool on table top    NELY Hines

## 2022-10-04 ENCOUNTER — OFFICE VISIT (OUTPATIENT)
Dept: ORTHOPEDICS | Facility: CLINIC | Age: 67
End: 2022-10-04
Payer: MEDICARE

## 2022-10-04 ENCOUNTER — CLINICAL SUPPORT (OUTPATIENT)
Dept: REHABILITATION | Facility: HOSPITAL | Age: 67
End: 2022-10-04
Payer: MEDICARE

## 2022-10-04 VITALS — BODY MASS INDEX: 32.14 KG/M2 | HEIGHT: 66 IN | WEIGHT: 200 LBS

## 2022-10-04 DIAGNOSIS — Z98.890 HX OF HAND SURGERY: Primary | ICD-10-CM

## 2022-10-04 DIAGNOSIS — M72.0 DUPUYTREN'S CONTRACTURE OF RIGHT HAND: ICD-10-CM

## 2022-10-04 PROCEDURE — 1159F MED LIST DOCD IN RCRD: CPT | Mod: CPTII,S$GLB,, | Performed by: ORTHOPAEDIC SURGERY

## 2022-10-04 PROCEDURE — 1160F RVW MEDS BY RX/DR IN RCRD: CPT | Mod: CPTII,S$GLB,, | Performed by: ORTHOPAEDIC SURGERY

## 2022-10-04 PROCEDURE — 3066F PR DOCUMENTATION OF TREATMENT FOR NEPHROPATHY: ICD-10-PCS | Mod: CPTII,S$GLB,, | Performed by: ORTHOPAEDIC SURGERY

## 2022-10-04 PROCEDURE — 99024 POSTOP FOLLOW-UP VISIT: CPT | Mod: S$GLB,,, | Performed by: ORTHOPAEDIC SURGERY

## 2022-10-04 PROCEDURE — 3066F NEPHROPATHY DOC TX: CPT | Mod: CPTII,S$GLB,, | Performed by: ORTHOPAEDIC SURGERY

## 2022-10-04 PROCEDURE — 97110 THERAPEUTIC EXERCISES: CPT | Mod: PN

## 2022-10-04 PROCEDURE — 3061F PR NEG MICROALBUMINURIA RESULT DOCUMENTED/REVIEW: ICD-10-PCS | Mod: CPTII,S$GLB,, | Performed by: ORTHOPAEDIC SURGERY

## 2022-10-04 PROCEDURE — 1101F PT FALLS ASSESS-DOCD LE1/YR: CPT | Mod: CPTII,S$GLB,, | Performed by: ORTHOPAEDIC SURGERY

## 2022-10-04 PROCEDURE — 97022 WHIRLPOOL THERAPY: CPT | Mod: PN

## 2022-10-04 PROCEDURE — 4010F PR ACE/ARB THEARPY RXD/TAKEN: ICD-10-PCS | Mod: CPTII,S$GLB,, | Performed by: ORTHOPAEDIC SURGERY

## 2022-10-04 PROCEDURE — 97530 THERAPEUTIC ACTIVITIES: CPT | Mod: PN

## 2022-10-04 PROCEDURE — 3008F PR BODY MASS INDEX (BMI) DOCUMENTED: ICD-10-PCS | Mod: CPTII,S$GLB,, | Performed by: ORTHOPAEDIC SURGERY

## 2022-10-04 PROCEDURE — 3061F NEG MICROALBUMINURIA REV: CPT | Mod: CPTII,S$GLB,, | Performed by: ORTHOPAEDIC SURGERY

## 2022-10-04 PROCEDURE — 4010F ACE/ARB THERAPY RXD/TAKEN: CPT | Mod: CPTII,S$GLB,, | Performed by: ORTHOPAEDIC SURGERY

## 2022-10-04 PROCEDURE — 1160F PR REVIEW ALL MEDS BY PRESCRIBER/CLIN PHARMACIST DOCUMENTED: ICD-10-PCS | Mod: CPTII,S$GLB,, | Performed by: ORTHOPAEDIC SURGERY

## 2022-10-04 PROCEDURE — 3288F PR FALLS RISK ASSESSMENT DOCUMENTED: ICD-10-PCS | Mod: CPTII,S$GLB,, | Performed by: ORTHOPAEDIC SURGERY

## 2022-10-04 PROCEDURE — 3288F FALL RISK ASSESSMENT DOCD: CPT | Mod: CPTII,S$GLB,, | Performed by: ORTHOPAEDIC SURGERY

## 2022-10-04 PROCEDURE — 1101F PR PT FALLS ASSESS DOC 0-1 FALLS W/OUT INJ PAST YR: ICD-10-PCS | Mod: CPTII,S$GLB,, | Performed by: ORTHOPAEDIC SURGERY

## 2022-10-04 PROCEDURE — 3008F BODY MASS INDEX DOCD: CPT | Mod: CPTII,S$GLB,, | Performed by: ORTHOPAEDIC SURGERY

## 2022-10-04 PROCEDURE — 99024 PR POST-OP FOLLOW-UP VISIT: ICD-10-PCS | Mod: S$GLB,,, | Performed by: ORTHOPAEDIC SURGERY

## 2022-10-04 PROCEDURE — 1159F PR MEDICATION LIST DOCUMENTED IN MEDICAL RECORD: ICD-10-PCS | Mod: CPTII,S$GLB,, | Performed by: ORTHOPAEDIC SURGERY

## 2022-10-04 PROCEDURE — 1125F PR PAIN SEVERITY QUANTIFIED, PAIN PRESENT: ICD-10-PCS | Mod: CPTII,S$GLB,, | Performed by: ORTHOPAEDIC SURGERY

## 2022-10-04 PROCEDURE — 1125F AMNT PAIN NOTED PAIN PRSNT: CPT | Mod: CPTII,S$GLB,, | Performed by: ORTHOPAEDIC SURGERY

## 2022-10-04 RX ORDER — METHYLPREDNISOLONE 4 MG/1
TABLET ORAL
Qty: 1 EACH | Refills: 0 | Status: SHIPPED | OUTPATIENT
Start: 2022-10-04 | End: 2022-11-01 | Stop reason: SDUPTHER

## 2022-10-04 NOTE — PROGRESS NOTES
[facility] ORTHOPEDICS POST-OP NOTE    Subjective:           Chief Complaint:   Chief Complaint   Patient presents with    Right Hand - Post-op Evaluation     Right Dupuytrens Release 07/27/22, patient is doing better, he states that physical therapy is helping.       Past Medical History:   Diagnosis Date    Dupuytren contracture 2022    Hyperlipidemia     Hypertension        Past Surgical History:   Procedure Laterality Date    COLONOSCOPY N/A 11/20/2019    Procedure: COLONOSCOPY;  Surgeon: Nabeel Hart MD;  Location: [facility] ENDO;  Service: Endoscopy;  Laterality: N/A;    DUPUYTREN CONTRACTURE RELEASE Right 7/27/2022    Procedure: RELEASE, DUPUYTREN CONTRACTURE;  Surgeon: Rafi Andre MD;  Location: [facility] OR;  Service: Orthopedics;  Laterality: Right;       Current Outpatient Medications   Medication Sig    amlodipine-benazepril 10-20mg (LOTREL) 10-20 mg per capsule Take 1 capsule by mouth once daily.    atorvastatin (LIPITOR) 40 MG tablet Take 1 tablet (40 mg total) by mouth once daily. For cholesterol    tamsulosin (FLOMAX) 0.4 mg Cap Take 1 capsule (0.4 mg total) by mouth once daily.    oxyCODONE-acetaminophen (PERCOCET) 7.5-325 mg per tablet Take 1 tablet by mouth every 4 (four) hours as needed for Pain. (Patient not taking: No sig reported)     No current facility-administered medications for this visit.       Review of patient's allergies indicates:  No Known Allergies    Family History   Problem Relation Age of Onset    Cancer Father         unkn type       Social History     Socioeconomic History    Marital status: Single   Tobacco Use    Smoking status: Never    Smokeless tobacco: Never   Substance and Sexual Activity    Alcohol use: Never    Drug use: Never       History of present illness:  Mr. Virgen comes in today for follow-up for his right hand Dupuytren's contracture excision.  He is just over 2 months postop.  He has been actively participating in occupational therapy.  He is pleased with the  "progress he is making there, however he is still quite tender/sensitive over the operative site and has reduced range of motion.    Review of Systems:    Musculoskeletal:  See HPI      Objective:        Physical Examination:    Vital Signs:  There were no vitals filed for this visit.    Body mass index is 32.28 kg/m².    This a well-developed, well nourished patient in no acute distress.  They are alert and oriented and cooperative to examination.        Right hand exam:  Skin right hand is clean dry and intact.  There is no erythema or ecchymosis.  Right hand palmar incision is well healed without wound dehiscence or drainage.  There are no signs or symptoms of infection.  He still has a quite tender to palpation area about the distal aspect of his surgical incision.  It is somewhat keloidal.  He is almost hypersensitive to palpation in an RSD type fashion.  He cannot fully close his right hand into a fist.  He cannot fully extend to open his hand.  This is all secondary to pain at the pinky finger with the surgery occurred.  He can oppose his right thumb to all digits in his right hand.  He is neurovascularly intact throughout the right upper extremity.    Pertinent New Results:    XRAY Report / Interpretation:   No new radiographs were taken on today's clinic visit.    Assessment/Plan:      1.  Status post right hand Dupuytren's contracture release.    Would recommend that he continue with his occupational therapy to work on range of motion and to desensitize the area.  We will place him on a Medrol Dosepak to help dwayne any inflammation and help expedite his progress with therapy.  We will check him back in 1 month to see how he is progressing.  He may need excision of the scar tissue.    Jorge Méndez, Physician Assistant, served in the capacity as a "scribe" for this patient encounter.  A "face-to-face" encounter occurred with Dr. Rafi Andre on this date.  The treatment plan and medical " decision-making is outlined above. Patient was seen and examined with a chaperone.     This note was created using Dragon voice recognition software that occasionally misinterpreted phrases or words.

## 2022-10-04 NOTE — PLAN OF CARE
[facility] Occupational Therapy Daily Treatment /Updated Plan of Care Note   Date: 10/4/2022  Name: Aleksandar Knight .  Clinic Number: [id number]  Therapy Diagnosis: Right hand pain and stiffness  Physician: Rafi Andre MD  Physician Orders: OT for RT hand, 2 x week for 4 weeks   Medical Diagnosis: Z98.890 (ICD-10-CM) - Hx of hand surgery M72.0 (ICD-10-CM) - Dupuytren's contracture of right hand   Surgical Procedure and Date: 7/27/2022; Right small finger Dupuytren's release open  Evaluation Date: 8/29/2022  Insurance Authorization Period Expiration: 10/23/22  Plan of Care Certification Period: 10/30/22 extend to 11/15/22  Date of Return to MD: 10/04/22  Visit # / Visits authorized: Eval 1 / 1 9/12 (including eval)  Time In:6:55  Time Out: 7:45  Total Billable Time: 50 minutes   Precautions:  Standard     post op     Subjective   Pt reports: Still really sensitive along that scar.      was compliant with home exercise program given    Response to previous treatment:  better   Functional change: as above  Pain: 3/10 on average  Location: right hand   FOTO score: Eval 8/29  36%  Goal 68%;  9/15 42 %     Objective    A/PROM                                       RIGHT                                         8/29/22                    INDEX      LONG      RING           LITTLE                 MCP Ext-Flexion       0-60        0-45      15-40/0-70     30-40/10-75  PIP Ext-Flexion         0-75        0-50      15-70/0-75     35-50/20-55  DIP Ext-Flexion         0-50        0-60       0-50 /NT         0-65/NT  AROM  9/13/22                     INDEX    LONG      RING     LITTLE                 MCP Ext-Flexion       0-50        0-60        10-55      10-60  PIP Ext-Flexion         0-90       10-95       20-80      20-70  DIP Ext-Flexion         0-50        0-70         0-55        0-65  10/04/22                     INDEX    LONG      RING     LITTLE                 MCP Ext-Flexion       0-60      "   0-60        10-60      10-65  PIP Ext-Flexion         0-90        0-95        15-80      20-85  DIP Ext-Flexion         0-50        0-70         0-60        0-65  Hand -  Strength  Jaymar dynamometer 2nd Rung in lbs;  Pinches:pinch gauge average of 2 trials in psi    9/29/22 9/29/22   Position Right Left   Hand : Trial 1 40 65   Hand : Trial 2 32 65   Hand : Trial 3 36 55    Average 36 61     Patient received the following treatment:   Supervised modalities after being cleared for contradictions for 10 minutes including:      Fluidotherapy to right hand for 10 minutes to increase tissue elasticity, desensitization, sensory re-education and for pain management. Air flow 80  heat 112 degrees   Manual therapy techniques to increase joint mobilization and soft tissue mobilization for 5 minutes including:    -Scar massage to ulnar volar aspect of hand for 5 minutes  to decrease dense adhesions and improve tensile glide.   Therapeutic activities to improve functional performance with use of dynamic activities for 8  minutes  including:  -in hand manipulation of wooden beads 1/4" (decreased by 1/4")  2  at a time and laced x 14 beads then unlaced with alternating fingers to palm to hold bead and slide off lace  ADDED-RED theraputty holding 1/2" dowel with gross grasp with ulnar side of hand and depress into putty x 20 repetitions    -grasping RED putty with ulnar side of hand and pulling upward x 10 repetitions    Therapeutic exercises to improve functional performance while increasing strength, endurance, ROM, and flexibility for 27  minutes, including:  Active assistive/Passive range of motion of each joint (all fingers) into flexion and extension and composite of each  -therapist holding of MPs in flexion as patient actively fisted x 5 repetitions   -fingers extension off table top isolated and composite 5 repetitions each  -little finger abduction/adduction 10 repetitions  -RED of theraputty  " gripping x 2 minutes     Instruction and demonstration given of all introduced   Patient required moderate cuing for correct performance of exercise.    Home Exercises and Education Provided   Education provided:  - discussed insurance limitation  - Progress towards goals     Written Home Exercises Provided: ADDED; gripping with RED putty (given putty)  Added: in hand manipulation of objects ~ 2 minutes and flicking of ring and little fingers as above 10 repetitions each 2 times a day. Patient was fitted with a piece of silicone gel sheeting for scar management to be worn under the orthotic. He took a picture of the instructions and precautions and therapist reviewed them with him. Reviewed orthotic guidelines: care (clean with soap and water and keep away from heat source), wear (for 1 hour 3 times during the day and wear while sleeping), precautions (increase in swelling, pain and pressure areas). Patient to call therapist if any problems arise with orthotic for proper adjustments to be made.   Patient instructed to cont prior HEP. Exercises were reviewed and he was able to demonstrate them prior to the end of the session. he demonstrated good  understanding of the HEP provided.   See EMR under Patient Instructions for exercises provided prior visit.     Assessment   Patient with decreasing frequency of pain but still high with electrical sharp pain; on average 3/10.  He is tolerating massage much better; most sensitivity at most proximal aspect of scar and noting electrical shock in little finger proximal phalanx.  His range is improving. Composite flexion difficult and lacking MP flexion it. If isolates MP flexion able to achieve greater range.  He was able to hold all fingers to palm post heat and stretches.  He continues with difficulty using right for grasping and carrying activities.   Pt  will continue to benefit from skilled OT to further address pain and deficits in ROM and strength which are hindering  ability to perform self care and IADLs. Patient's cultural,spiritual, and educational needs were considered  Goals:  Short Term Goals  Patient with increase in ROM and decrease in pain allowing for increase use of right for to feed self with greater ease in 2 weeks-met  Patient with decrease in pain with palpation in 3 weeks-ongoing  Patient able to use right with greater ease to bathe self in 3 weeks-met  Patient with increase in ROM and decrease in pain allowing for increase in use for folding of clothes in 4 weeks-met  Long Term Goals  Patient with decrease in pain and increase in motion and strength allowing for greater ease with meal preparation in 5 weeks-ongoing  Patient able to use right to help operate lawnmower in 5 weeks-ongoing  Patient able to perform sweeping and mopping with increase use of right for greater ease in performance in 6 weeks-ongoing  Improve FOTO score by 15 points indicating improved function of right hand in 8 weeks-ongoing  Plan   Continue skilled therapy 2 times a week for 4 weeks then may decrease to 1 time a week for 2 weeks including therapeutic exercises and activities, manual therapy, and pain modalities, adjust orthotic as needed     NELY Hines

## 2022-10-04 NOTE — Clinical Note
Patient has an appt with you this morning. His hypersensitivity and range is improving. Continues with limitations in range and strength hindering functional use of hand.  Please see today's note for specifics. Thank you, Verónica

## 2022-10-06 ENCOUNTER — CLINICAL SUPPORT (OUTPATIENT)
Dept: REHABILITATION | Facility: HOSPITAL | Age: 67
End: 2022-10-06
Payer: MEDICARE

## 2022-10-06 DIAGNOSIS — Z98.890 HX OF HAND SURGERY: Primary | ICD-10-CM

## 2022-10-06 PROCEDURE — 97140 MANUAL THERAPY 1/> REGIONS: CPT | Mod: PN

## 2022-10-06 PROCEDURE — 97022 WHIRLPOOL THERAPY: CPT | Mod: PN

## 2022-10-06 PROCEDURE — 97110 THERAPEUTIC EXERCISES: CPT | Mod: PN

## 2022-10-06 PROCEDURE — 97530 THERAPEUTIC ACTIVITIES: CPT | Mod: PN

## 2022-10-06 NOTE — PROGRESS NOTES
[facility] Daily Treatment Note   Date: 10/6/2022  Name: Aleksandar Knight Sr.  Clinic Number: [id number]  Therapy Diagnosis: Right hand pain and stiffness  Physician: Rafi Andre MD  Physician Orders: OT for RT hand, 2 x week for 4 weeks   Medical Diagnosis: Z98.890 (ICD-10-CM) - Hx of hand surgery M72.0 (ICD-10-CM) - Dupuytren's contracture of right hand   Surgical Procedure and Date: 7/27/2022; Right small finger Dupuytren's release open  Evaluation Date: 8/29/2022  Insurance Authorization Period Expiration: 10/23/22  Plan of Care Certification Period: 10/30/22 extend to 11/15/22  Date of Return to MD: 1/22  Visit # / Visits authorized: Brandin 1 / 1    10/12 (including eval)  Time In:6:50  Time Out: 7:45  Total Billable Time: 54 minutes   Precautions:  Standard     post op     Subjective   Pt reports: Getting a burning pain when massaging it and trying to stretch it out.   said if I'm still having this much pain and can't fully straighten or make a fist in a month he is going to do surgery to take out the scar tissue.  Wearing the splint at night for several hours then have to take it off because it starts to hurt but then I put it back on for an hour or so several times during the day. Still not able to use that right hand to do a lot of things. When I do the lawn I'm not able to hold onto the handle with the ring and little fingers.     was compliant with home exercise program given    Response to previous treatment: better   Functional change: as above  Pain: 3/10 on average  Location: right hand   FOTO score: Eval 8/29  36%  Goal 68%;  9/15 42 %     Objective   Patient received the following treatment:   Supervised modalities after being cleared for contradictions for 10 minutes including:      Fluidotherapy to right hand for 10 minutes to increase tissue elasticity, desensitization, sensory re-education and for pain management. Air flow 80  heat 112 degrees  "  Manual therapy techniques to increase joint mobilization and soft tissue mobilization for 8 minutes including:    -Scar massage to ulnar volar aspect of hand for 8 minutes  to decrease dense adhesions and improve tensile glide.   Therapeutic activities to improve functional performance with use of dynamic activities for 8  minutes  including:  -in hand manipulation of beads 1/2"  2 at a time and laced x 20 beads then removed with flexion to palm to hold bead as unlaced  (attempted 1/4" beads- great difficulty maintaining hold on one as manipulated the other   -RED theraputty gripping with ulnar aspect of hand and pulling   Therapeutic exercises to improve functional performance while increasing strength, endurance, ROM, and flexibility for 28  minutes, including:  Active assistive/Passive range of motion of each joint (all fingers) into flexion and extension and composite of each  -therapist holding of MPs in flexion as patient actively fisted x 5 repetitions   -fingers extension off table top isolated and composite 5 repetitions each  -little finger abduction/adduction 10 repetitions  -RED of theraputty rolling out to stretch into extension then gripping x 2 minutes   -place and hold into fist x 5 repetitions   Instruction and demonstration given of all introduced   Patient required moderate cuing for correct performance of exercise.    Home Exercises and Education Provided   Education provided:  - discussed insurance limitation  - Progress towards goals     Written Home Exercises Provided: 9/07/22 Added: in hand manipulation of objects ~ 2 minutes and flicking of ring and little fingers as above 10 repetitions each 2 times a day. Patient was fitted with a piece of silicone gel sheeting for scar management to be worn under the orthotic. He took a picture of the instructions and precautions and therapist reviewed them with him. Reviewed orthotic guidelines: care (clean with soap and water and keep away from heat " source), wear (for 1 hour 3 times during the day and wear while sleeping), precautions (increase in swelling, pain and pressure areas). Patient to call therapist if any problems arise with orthotic for proper adjustments to be made.   Patient instructed to cont prior HEP. Exercises were reviewed and he was able to demonstrate them prior to the end of the session. he demonstrated good  understanding of the HEP provided.   See EMR under Patient Instructions for exercises provided prior visit.     Assessment   Patient with burning at finger and surgical site with massage and passive stretch into extension. Passive flexion into a fist is achieved post heat and massage. He is able to hold momentarily once placed. He continues to note limited use of hand due to pain and limited range. Pt  will continue to benefit from skilled OT to further address pain and deficits in ROM and strength which are hindering ability to perform self care and IADLs. Patient's cultural,spiritual, and educational needs were considered  Goals:  Short Term Goals  Patient with increase in ROM and decrease in pain allowing for increase use of right for to feed self with greater ease in 2 weeks-met  Patient with decrease in pain with palpation in 3 weeks-ongoing  Patient able to use right with greater ease to bathe self in 3 weeks-met  Patient with increase in ROM and decrease in pain allowing for increase in use for folding of clothes in 4 weeks-met  Long Term Goals  Patient with decrease in pain and increase in motion and strength allowing for greater ease with meal preparation in 5 weeks-ongoing  Patient able to use right to help operate lawnmower in 5 weeks-ongoing  Patient able to perform sweeping and mopping with increase use of right for greater ease in performance in 6 weeks-ongoing  Improve FOTO score by 15 points indicating improved function of right hand in 8 weeks-ongoing  Plan   Continue skilled therapy 2 times a week for 4 weeks then may  decrease to 1 time a week for 2 weeks including therapeutic exercises and activities, manual therapy, and pain modalities, adjust orthotic as needed   next session: May try silicone gel sheet again and use tape to keep it in place (while have splint on) lifting of cuff weights to stool on table top    NELY Hines

## 2022-10-11 ENCOUNTER — CLINICAL SUPPORT (OUTPATIENT)
Dept: REHABILITATION | Facility: HOSPITAL | Age: 67
End: 2022-10-11
Payer: MEDICARE

## 2022-10-11 DIAGNOSIS — Z98.890 HX OF HAND SURGERY: Primary | ICD-10-CM

## 2022-10-11 PROCEDURE — 97530 THERAPEUTIC ACTIVITIES: CPT | Mod: PN

## 2022-10-11 PROCEDURE — 97140 MANUAL THERAPY 1/> REGIONS: CPT | Mod: PN

## 2022-10-11 PROCEDURE — 97022 WHIRLPOOL THERAPY: CPT | Mod: PN

## 2022-10-11 PROCEDURE — 97110 THERAPEUTIC EXERCISES: CPT | Mod: PN

## 2022-10-11 NOTE — PROGRESS NOTES
[facility] Occupational Therapy Daily Treatment Note   Date: 10/11/2022  Name: Aleksandar Knight Sr.  Clinic Number: [id number]  Therapy Diagnosis: Right hand pain and stiffness  Physician: Rafi Andre MD  Physician Orders: OT for RT hand, 2 x week for 4 weeks   Medical Diagnosis: Z98.890 (ICD-10-CM) - Hx of hand surgery M72.0 (ICD-10-CM) - Dupuytren's contracture of right hand   Surgical Procedure and Date: 7/27/2022; Right small finger Dupuytren's release open  Evaluation Date: 8/29/2022  Insurance Authorization Period Expiration: 10/23/22  Plan of Care Certification Period: 10/30/22 extend to 11/15/22  Date of Return to MD: 11/1/22  Visit # / Visits authorized: Brandin 1 / 1 11/12 (including eval)  Time In:6:50  Time Out: 7:45  Total Billable Time: 55 minutes   Precautions:  Standard     post op     Subjective   Pt reports: Started taking the medication the doctor gave me. On the 5th day have to take for 7 days. This morning able to bend my finger with very little pain and hurts to straighten but not as bad.Able to hold onto toilet brush little better.    was compliant with home exercise program given    Response to previous treatment: better   Functional change: as above  Pain: 2/10 with bending finger 5/10 with straightening it  Location: right hand   FOTO score: Eval 8/29  36%  Goal 68%;  9/15 42 %     Objective   Patient received the following treatment:   Supervised modalities after being cleared for contradictions for 10 minutes including:      Fluidotherapy to right hand for 10 minutes to increase tissue elasticity, desensitization, sensory re-education and for pain management. Air flow 80  heat 112 degrees   Manual therapy techniques to increase joint mobilization and soft tissue mobilization for 8 minutes including:    -Scar massage to ulnar volar aspect of hand for 8 minutes  to decrease dense adhesions and improve tensile glide.   Therapeutic activities to improve functional  "performance with use of dynamic activities for 8  minutes  including:  - beads 1/4"   laced x 10 beads placed and removed with flexion to palm to hold bead as performed    -RED theraputty gripping with ulnar aspect of hand and pulling   -"highlighter" roll with 1/2"  small dowel x 15 repetitions   -ADDED: gross grasp on screw  as placed 2 screws into screw board   Therapeutic exercises to improve functional performance while increasing strength, endurance, ROM, and flexibility for 29 minutes, including:  Active assistive/Passive range of motion of each joint (all fingers) into flexion and extension and composite of each  -therapist holding of MPs in flexion as patient actively fisted x 5 repetitions   -fingers extension off table top isolated and composite 5 repetitions each  -little finger abduction/adduction 10 repetitions  --RED of theraputty rolling out to stretch into extension then gripping x 2 minutes   -place and hold into fist x 5 repetitions   -ADDED: Gripper 35# 7 repetitions then due to pain and fatigue decreased  to 25# x 10 repetitions   Instruction and demonstration given of all introduced   Patient required moderate cuing for correct performance of exercise.    Home Exercises and Education Provided   Education provided:  - discussed insurance limitation  - Progress towards goals     Written Home Exercises Provided: 9/07/22 Added: in hand manipulation of objects ~ 2 minutes and flicking of ring and little fingers as above 10 repetitions each 2 times a day. Patient was fitted with a piece of silicone gel sheeting for scar management to be worn under the orthotic. He took a picture of the instructions and precautions and therapist reviewed them with him. Reviewed orthotic guidelines: care (clean with soap and water and keep away from heat source), wear (for 1 hour 3 times during the day and wear while sleeping), precautions (increase in swelling, pain and pressure areas). Patient to call therapist " if any problems arise with orthotic for proper adjustments to be made.   Patient instructed to cont prior HEP. Exercises were reviewed and he was able to demonstrate them prior to the end of the session. he demonstrated good  understanding of the HEP provided.   See EMR under Patient Instructions for exercises provided prior visit.     Assessment   Patient noting decrease in pain with use of Medrol dose pack. He exhibited less shaking of hand with exercise due to decrease in pain. He tolerated finger flexion better and was able to use smaller beads today. Gripper performed with great effort. Pt  will continue to benefit from skilled OT to further address pain and deficits in ROM and strength which are hindering ability to perform self care and IADLs. Patient's cultural,spiritual, and educational needs were considered  Goals:  Short Term Goals  Patient with increase in ROM and decrease in pain allowing for increase use of right for to feed self with greater ease in 2 weeks-met  Patient with decrease in pain with palpation in 3 weeks-ongoing  Patient able to use right with greater ease to bathe self in 3 weeks-met  Patient with increase in ROM and decrease in pain allowing for increase in use for folding of clothes in 4 weeks-met  Long Term Goals  Patient with decrease in pain and increase in motion and strength allowing for greater ease with meal preparation in 5 weeks-ongoing  Patient able to use right to help operate lawnmower in 5 weeks-ongoing  Patient able to perform sweeping and mopping with increase use of right for greater ease in performance in 6 weeks-ongoing  Improve FOTO score by 15 points indicating improved function of right hand in 8 weeks-ongoing  Plan   Continue skilled therapy 2 times a week for 4 weeks then may decrease to 1 time a week for 2 weeks including therapeutic exercises and activities, manual therapy, and pain modalities, adjust orthotic as needed   next session: May try silicone gel  sheet again and use tape to keep it in place (while have splint on) lifting of cuff weights to stool on table top    NELY Hines

## 2022-10-12 NOTE — PROGRESS NOTES
[facility] Occupational Therapy Daily Treatment Note   Date: 10/13/2022  Name: Aleksandar nKight .  Clinic Number: [id number]  Therapy Diagnosis: Right hand pain and stiffness  Physician: Rafi Andre MD  Physician Orders: OT for RT hand, 2 x week for 4 weeks   Medical Diagnosis: Z98.890 (ICD-10-CM) - Hx of hand surgery M72.0 (ICD-10-CM) - Dupuytren's contracture of right hand   Surgical Procedure and Date: 7/27/2022; Right small finger Dupuytren's release open  Evaluation Date: 8/29/2022  Insurance Authorization Period Expiration: 10/23/22  Plan of Care Certification Period: 10/30/22 extend to 11/15/22 (updated 10/04)  Date of Return to MD: 11/1/22  Visit # / Visits authorized: Catinaal 1 / 1 12/12 (including eval)  Time In:7:00  Time Out: 7:45  Total Billable Time: 45 minutes   Precautions:  Standard     post op     Subjective   Pt reports: Can tell the pain is coming back some-indicating at little proximal phalanx. Can lift a gallon of milk but can't quite pour it.    was compliant with home exercise program given    Response to previous treatment: better   Functional change: as above  Pain: 5/10 at worst  Location: right hand   FOTO score: Eval 8/29  36%  Goal 68%;  9/15  42 %     Objective   Patient received the following treatment:   Supervised modalities after being cleared for contradictions for 10 minutes including:      Fluidotherapy to right hand for 10 minutes to increase tissue elasticity, desensitization, sensory re-education and for pain management. Air flow 80  heat 112 degrees   Manual therapy techniques to increase joint mobilization and soft tissue mobilization for 8 minutes including:    -Scar massage to ulnar volar aspect of hand for 8 minutes  to decrease dense adhesions and improve tensile glide.   Therapeutic activities to improve functional performance with use of dynamic activities for 5  minutes  including:   -GREEN (increased by 1 resistance level) theraputty  "gripping with ulnar aspect of hand and pulling   -"highlighter" roll with 1/2"  small dowel x 15 repetitions   Therapeutic exercises to improve functional performance while increasing strength, endurance, ROM, and flexibility for 22 minutes, including:  Active assistive/Passive range of motion of each joint (all fingers) into flexion and extension and composite of each  -therapist holding of MPs in flexion as patient actively fisted x 5 repetitions   -fingers extension off table top isolated and composite 5 repetitions each  -little finger abduction/adduction 10 repetitions  --GREEN (increased by 1 level resistance)  of theraputty rolling out to stretch into extension then gripping x 2 minutes  ADDED: dowel with string with 4# rolled up 20 repetitions   -place and hold into fist x 5 repetitions   -Gripper 35# 10 (increased by 3 repetitions ) repetitions then  decreased  to 25# x 10 repetitions   Instruction and demonstration given of all introduced   Patient required moderate cuing for correct performance of exercise.    Home Exercises and Education Provided   Education provided:  - discussed insurance limitation  - Progress towards goals     Written Home Exercises Provided: 9/07/22 Added: in hand manipulation of objects ~ 2 minutes and flicking of ring and little fingers as above 10 repetitions each 2 times a day. Patient was fitted with a piece of silicone gel sheeting for scar management to be worn under the orthotic. He took a picture of the instructions and precautions and therapist reviewed them with him. Reviewed orthotic guidelines: care (clean with soap and water and keep away from heat source), wear (for 1 hour 3 times during the day and wear while sleeping), precautions (increase in swelling, pain and pressure areas). Patient to call therapist if any problems arise with orthotic for proper adjustments to be made.   Patient instructed to cont prior HEP. Exercises were reviewed and he was able to " demonstrate them prior to the end of the session. he demonstrated good  understanding of the HEP provided.   See EMR under Patient Instructions for exercises provided prior visit.     Assessment   Patient noting a little return of pain that had resolved with use of Medrol dose pack. He was able to tolerate use of increase resistance for putty exercise. He was able to complete a fist post heat and stretches. Full extension continues to be difficult to achieve.  Pt  will continue to benefit from skilled OT to further address pain and deficits in ROM and strength which are hindering ability to perform self care and IADLs. Patient's cultural,spiritual, and educational needs were considered  Goals:  Short Term Goals  Patient with increase in ROM and decrease in pain allowing for increase use of right for to feed self with greater ease in 2 weeks-met  Patient with decrease in pain with palpation in 3 weeks-ongoing  Patient able to use right with greater ease to bathe self in 3 weeks-met  Patient with increase in ROM and decrease in pain allowing for increase in use for folding of clothes in 4 weeks-met  Long Term Goals  Patient with decrease in pain and increase in motion and strength allowing for greater ease with meal preparation in 5 weeks-ongoing  Patient able to use right to help operate lawnmower in 5 weeks-ongoing  Patient able to perform sweeping and mopping with increase use of right for greater ease in performance in 6 weeks-ongoing  Improve FOTO score by 15 points indicating improved function of right hand in 8 weeks-ongoing  Plan   Continue skilled therapy 2 times a week for 3 weeks then may decrease to 1 time a week for 2 weeks including therapeutic exercises and activities, manual therapy, and pain modalities, adjust orthotic as needed   next session: May try silicone gel sheet again and use tape to keep it in place (while have splint on)     NELY Hines

## 2022-10-13 ENCOUNTER — CLINICAL SUPPORT (OUTPATIENT)
Dept: REHABILITATION | Facility: HOSPITAL | Age: 67
End: 2022-10-13
Payer: MEDICARE

## 2022-10-13 DIAGNOSIS — Z98.890 HX OF HAND SURGERY: Primary | ICD-10-CM

## 2022-10-13 PROCEDURE — 97022 WHIRLPOOL THERAPY: CPT | Mod: PN

## 2022-10-13 PROCEDURE — 97140 MANUAL THERAPY 1/> REGIONS: CPT | Mod: PN

## 2022-10-13 PROCEDURE — 97110 THERAPEUTIC EXERCISES: CPT | Mod: PN

## 2022-10-18 ENCOUNTER — CLINICAL SUPPORT (OUTPATIENT)
Dept: REHABILITATION | Facility: HOSPITAL | Age: 67
End: 2022-10-18
Payer: MEDICARE

## 2022-10-18 DIAGNOSIS — Z98.890 HX OF HAND SURGERY: Primary | ICD-10-CM

## 2022-10-18 PROCEDURE — 97110 THERAPEUTIC EXERCISES: CPT | Mod: PN

## 2022-10-18 PROCEDURE — 97140 MANUAL THERAPY 1/> REGIONS: CPT | Mod: PN

## 2022-10-18 PROCEDURE — 97022 WHIRLPOOL THERAPY: CPT | Mod: PN

## 2022-10-18 NOTE — PROGRESS NOTES
[facility] Occupational Therapy Daily Treatment Note   Date: 10/18/2022  Name: Aleksandar Knight .  Clinic Number: [id number]  Therapy Diagnosis: Right hand pain and stiffness  Physician: Rafi Andre MD  Physician Orders: OT for RT hand, 2 x week for 4 weeks   Medical Diagnosis: Z98.890 (ICD-10-CM) - Hx of hand surgery M72.0 (ICD-10-CM) - Dupuytren's contracture of right hand   Surgical Procedure and Date: 7/27/2022; Right small finger Dupuytren's release open  Evaluation Date: 8/29/2022  Insurance Authorization Period Expiration: 10/23/22  Plan of Care Certification Period: 10/30/22 extend to 11/15/22 (updated 10/04)  Date of Return to MD: 11/1/22  Visit # / Visits authorized: Eval 1 / 1 12/12 (including eval)   1/pending  Time In:6:52  Time Out: 7:45  Total Billable Time: 53 minutes   Precautions:  Standard     post op     Subjective   Pt reports: It's at least 75% better since the surgery. The pain came back since not on medication anymore but not as bad as it was. Still very sensitive here (indicating at proximal phalanx little finger). Noted numbness along lateral aspect of little finger since I took the medicine.    was compliant with home exercise program given    Response to previous treatment: better   Functional change: as above  Pain: 5/10 at worst  Location: right hand   FOTO score: Eval 8/29  36%  Goal 68%;  9/15  42 %     Objective   Patient received the following treatment:   Supervised modalities after being cleared for contradictions for 10 minutes including:      Fluidotherapy to right hand for 10 minutes to increase tissue elasticity, desensitization, sensory re-education and for pain management. Air flow 80  heat 112 degrees   Manual therapy techniques to increase joint mobilization and soft tissue mobilization for 10 minutes including:    -Scar massage to ulnar volar aspect of hand for 10 minutes  to decrease dense adhesions and improve tensile glide.   Therapeutic  "activities to improve functional performance with use of dynamic activities for 3  minutes  including:   -"cookie cutting" of GREEN theraputty with 1" dowel   Therapeutic exercises to improve functional performance while increasing strength, endurance, ROM, and flexibility for 30 minutes, including:  Active assistive/Passive range of motion of each joint (all fingers) into flexion and extension and composite of each  -therapist holding of MPs in flexion as patient actively fisted x 5 repetitions   -fingers extension off table top isolated and composite 5 repetitions each  -little finger abduction/adduction 10 repetitions  --GREEN of theraputty rolling out to stretch into extension then gripping x 2 minutes  dowel with string with 4# rolled up 20 repetitions   -place and hold into fist x 5 repetitions   -Gripper 35# 10 repetitions then  decreased to 25# x 10 repetitions   Instruction and demonstration given of all introduced   Patient required moderate cuing for correct performance of exercise.    Home Exercises and Education Provided   Education provided:  - discussed insurance limitation  - Progress towards goals     Written Home Exercises Provided: 9/07/22 Added: in hand manipulation of objects ~ 2 minutes and flicking of ring and little fingers as above 10 repetitions each 2 times a day. Patient was fitted with a piece of silicone gel sheeting for scar management to be worn under the orthotic. He took a picture of the instructions and precautions and therapist reviewed them with him. Reviewed orthotic guidelines: care (clean with soap and water and keep away from heat source), wear (for 1 hour 3 times during the day and wear while sleeping), precautions (increase in swelling, pain and pressure areas). Patient to call therapist if any problems arise with orthotic for proper adjustments to be made.   Patient instructed to cont prior HEP. Exercises were reviewed and he was able to demonstrate them prior to the end " "of the session. he demonstrated good  understanding of the HEP provided.   See EMR under Patient Instructions for exercises provided prior visit.     Assessment   Patient noting pain is at least 75% less than right after surgery. He does have persisting hypersensitivity at little proximal phalanx and of lesser intensity at proximal scar. Patient continues with weakness. He had to reposition flexbar several times for it to be comfortable in hand, unable to tolerate prior attempt.  Pt  will continue to benefit from skilled OT to further address pain and deficits in ROM and strength which are hindering ability to perform self care and IADLs. Patient's cultural,spiritual, and educational needs were considered  Goals:  Short Term Goals  Patient with increase in ROM and decrease in pain allowing for increase use of right for to feed self with greater ease in 2 weeks-met  Patient with decrease in pain with palpation in 3 weeks-ongoing  Patient able to use right with greater ease to bathe self in 3 weeks-met  Patient with increase in ROM and decrease in pain allowing for increase in use for folding of clothes in 4 weeks-met  Long Term Goals  Patient with decrease in pain and increase in motion and strength allowing for greater ease with meal preparation in 5 weeks-ongoing  Patient able to use right to help operate lawnmower in 5 weeks-ongoing  Patient able to perform sweeping and mopping with increase use of right for greater ease in performance in 6 weeks-ongoing  Improve FOTO score by 15 points indicating improved function of right hand in 8 weeks-ongoing  Plan   Continue skilled therapy 2 times a week for 3 weeks then may decrease to 1 time a week for 2 weeks including therapeutic exercises and activities, manual therapy, and pain modalities, adjust orthotic as needed   next session:  in hand manipulation of 1/4" beads un/lace    NELY Hines                         "

## 2022-10-20 ENCOUNTER — CLINICAL SUPPORT (OUTPATIENT)
Dept: REHABILITATION | Facility: HOSPITAL | Age: 67
End: 2022-10-20
Payer: MEDICARE

## 2022-10-20 DIAGNOSIS — Z98.890 HX OF HAND SURGERY: Primary | ICD-10-CM

## 2022-10-20 PROCEDURE — 97022 WHIRLPOOL THERAPY: CPT | Mod: PN

## 2022-10-20 PROCEDURE — 97110 THERAPEUTIC EXERCISES: CPT | Mod: PN

## 2022-10-20 PROCEDURE — 97140 MANUAL THERAPY 1/> REGIONS: CPT | Mod: PN

## 2022-10-20 NOTE — PROGRESS NOTES
[facility] Occupational Therapy Daily Treatment Note   Date: 10/20/2022  Name: Aleksandar Knight .  Clinic Number: [id number]  Therapy Diagnosis: Right hand pain and stiffness  Physician: Rafi Andre MD  Physician Orders: OT for RT hand, 2 x week for 4 weeks   Medical Diagnosis: Z98.890 (ICD-10-CM) - Hx of hand surgery M72.0 (ICD-10-CM) - Dupuytren's contracture of right hand   Surgical Procedure and Date: 7/27/2022; Right small finger Dupuytren's release open  Evaluation Date: 8/29/2022  Insurance Authorization Period Expiration: 10/23/22  Plan of Care Certification Period: 10/30/22 extend to 11/15/22 (updated 10/04)  Date of Return to MD: 11/1/22  Visit # / Visits authorized: Eval 1 / 1 12/12 (including eval)   2/pending  Time In:6:53  Time Out: 7:46  Total Billable Time: 53 minutes   Precautions:  Standard     post op     Subjective   Pt reports: Went to him because I couldn't straighten my finger. Still can't . I I try to sit with my palm down I get sharp pain/twitches in it.  I was able to use that silicone, stayed on fine.   was compliant with home exercise program given    Response to previous treatment: better   Functional change: as above  Pain: 10/10 at worst during session today during massage of little proximal phalanx  Location: right hand   FOTO score: Eval 8/29  36%  Goal 68%;  9/15  42 %     Objective   Patient received the following treatment:   Supervised modalities after being cleared for contradictions for 10 minutes including:      Fluidotherapy to right hand for 10 minutes to increase tissue elasticity, desensitization, sensory re-education and for pain management. Air flow 80  heat 112 degrees   Manual therapy techniques to increase joint mobilization and soft tissue mobilization for 10 minutes including:    -Scar massage to ulnar volar aspect of hand for 10 minutes  to decrease dense adhesions and improve tensile glide.   Therapeutic activities to improve  "functional performance with use of dynamic activities for 5  minutes  including:   -in hand manipulation of 2  1/4" beads (12) as laced and unlaced with little to palm  -GREEN  theraputty grasp with ulnar aspect of hand and pull x 20 repetitions   Therapeutic exercises to improve functional performance while increasing strength, endurance, ROM, and flexibility for 28 minutes, including:  -Active assistive/Passive range of motion of each joint (all fingers) into flexion and extension and composite of each  -therapist holding of MPs in flexion as patient actively fisted x 5 repetitions   -fingers extension off table top isolated and composite 5 repetitions each  -little finger abduction/adduction 10 repetitions  -GREEN putty  gripping x 2 minutes  -dowel with string with 4# rolled up 20 repetitions   -place and hold into fist x 5 repetitions   -Gripper 35# 10 repetitions then  decreased to 25# x 10 repetitions   Instruction and demonstration given of all introduced   Patient required moderate cuing for correct performance of exercise.    Home Exercises and Education Provided   Education provided:  - discussed insurance limitation  - Progress towards goals     Written Home Exercises Provided: 10/20 Encouraged sitting with left hand over right to provide stretch into extension while sedentary. 9/07/22 Added: in hand manipulation of objects ~ 2 minutes and flicking of ring and little fingers as above 10 repetitions each 2 times a day. Patient was fitted with a piece of silicone gel sheeting for scar management to be worn under the orthotic. He took a picture of the instructions and precautions and therapist reviewed them with him. Reviewed orthotic guidelines: care (clean with soap and water and keep away from heat source), wear (for 1 hour 3 times during the day and wear while sleeping), precautions (increase in swelling, pain and pressure areas). Patient to call therapist if any problems arise with orthotic for proper " "adjustments to be made.   Patient instructed to cont prior HEP. Exercises were reviewed and he was able to demonstrate them prior to the end of the session. he demonstrated good  understanding of the HEP provided.   See EMR under Patient Instructions for exercises provided prior visit.     Assessment   Patient noting continues with inability to fully straighten little finger. He experiences sharp painful twinges if tries to let hand rest palm down.  Less pain noted flexion. Still unable to make a tight fist. Pt  will continue to benefit from skilled OT to further address pain and deficits in ROM and strength which are hindering ability to perform self care and IADLs. Patient's cultural,spiritual, and educational needs were considered  Goals:  Short Term Goals  Patient with increase in ROM and decrease in pain allowing for increase use of right for to feed self with greater ease in 2 weeks-met  Patient with decrease in pain with palpation in 3 weeks-ongoing  Patient able to use right with greater ease to bathe self in 3 weeks-met  Patient with increase in ROM and decrease in pain allowing for increase in use for folding of clothes in 4 weeks-met  Long Term Goals  Patient with decrease in pain and increase in motion and strength allowing for greater ease with meal preparation in 5 weeks-ongoing  Patient able to use right to help operate lawnmower in 5 weeks-ongoing  Patient able to perform sweeping and mopping with increase use of right for greater ease in performance in 6 weeks-ongoing  Improve FOTO score by 15 points indicating improved function of right hand in 8 weeks-ongoing  Plan   Continue skilled therapy 2 times a week for 3 weeks then may decrease to 1 time a week for 2 weeks including therapeutic exercises and activities, manual therapy, and pain modalities, adjust orthotic as needed   next session:  in hand manipulation of 1/4" beads un/lace    NELY Hines                           "

## 2022-10-25 ENCOUNTER — CLINICAL SUPPORT (OUTPATIENT)
Dept: REHABILITATION | Facility: HOSPITAL | Age: 67
End: 2022-10-25
Payer: MEDICARE

## 2022-10-25 DIAGNOSIS — Z98.890 HX OF HAND SURGERY: Primary | ICD-10-CM

## 2022-10-25 PROCEDURE — 97110 THERAPEUTIC EXERCISES: CPT | Mod: PN

## 2022-10-25 PROCEDURE — 97022 WHIRLPOOL THERAPY: CPT | Mod: PN

## 2022-10-25 PROCEDURE — 97140 MANUAL THERAPY 1/> REGIONS: CPT | Mod: PN

## 2022-10-25 NOTE — PROGRESS NOTES
[facility] Occupational Therapy Daily Treatment Note   Date: 10/25/2022  Name: Aleksandar Knight .  Clinic Number: [id number]  Therapy Diagnosis: Right hand pain and stiffness  Physician: Rafi Andre MD  Physician Orders: OT for RT hand, 2 x week for 4 weeks   Medical Diagnosis: Z98.890 (ICD-10-CM) - Hx of hand surgery M72.0 (ICD-10-CM) - Dupuytren's contracture of right hand   Surgical Procedure and Date: 7/27/2022; Right small finger Dupuytren's release open  Evaluation Date: 8/29/2022  Insurance Authorization Period Expiration: 10/23/22  Plan of Care Certification Period: 10/30/22 extend to 11/15/22 (updated 10/04)  Date of Return to MD: 11/1/22  Visit # / Visits authorized: Eval 1 / 1 12/12 (including eval)  3/pending  Time In:6:53  Time Out: 7:46  Total Billable Time: 53 minutes   Precautions:  Standard     post op     Subjective   Pt reports: Thursday afternoon my fingers (ring and little) and that side of my hand swollen up. It was throbbing and burning until Sunday up to an 8/10.  Hurts more to straighten my fingers.    was compliant with home exercise program given    Response to previous treatment: better   Functional change: as above  Pain: 4/10 start of session  Location: right hand   FOTO score: Eval 8/29  36%  Goal 68%;  9/15  42 %     Objective   Patient received the following treatment:   Supervised modalities after being cleared for contradictions for 10 minutes including:      Fluidotherapy to right hand for 10 minutes to increase tissue elasticity, desensitization, sensory re-education and for pain management. Air flow 80  heat 112 degrees   Manual therapy techniques to increase joint mobilization and soft tissue mobilization for 8 minutes including:    -Scar massage to ulnar volar aspect of hand for 8 minutes  to decrease dense adhesions and improve tensile glide.   Therapeutic activities to improve functional performance with use of dynamic activities for 5  minutes   "including:   -GREEN  theraputty grasp with ulnar aspect of hand and pull x 20 repetitions   -GREEN theraputty "cookie cutting" with 1" cardboard dowel board-having to use thumb to help depress today  Therapeutic exercises to improve functional performance while increasing strength, endurance, ROM, and flexibility for 30 minutes, including:  -Active assistive/Passive range of motion of each joint (all fingers) into flexion and extension and composite of each  -therapist holding of MPs in flexion as patient actively fisted x 5 repetitions   -fingers extension off table top isolated and composite 5 repetitions each  -little finger abduction/adduction 10 repetitions  -GREEN putty  gripping x 2 minutes  -dowel with string with 4# rolled up 20 repetitions   -place and hold into fist x 5 repetitions   -Gripper 35# 15 repetitions then  decreased to 25# x 10 repetitions   Instruction and demonstration given of all introduced   Patient required moderate cuing for correct performance of exercise.    Home Exercises and Education Provided   Education provided:  - discussed insurance limitation  - Progress towards goals     Written Home Exercises Provided: 10/20 Encouraged sitting with left hand over right to provide stretch into extension while sedentary. 9/07/22 Added: in hand manipulation of objects ~ 2 minutes and flicking of ring and little fingers as above 10 repetitions each 2 times a day. Patient was fitted with a piece of silicone gel sheeting for scar management to be worn under the orthotic. He took a picture of the instructions and precautions and therapist reviewed them with him. Reviewed orthotic guidelines: care (clean with soap and water and keep away from heat source), wear (for 1 hour 3 times during the day and wear while sleeping), precautions (increase in swelling, pain and pressure areas). Patient to call therapist if any problems arise with orthotic for proper adjustments to be made.   Patient instructed to " cont prior HEP. Exercises were reviewed and he was able to demonstrate them prior to the end of the session. he demonstrated good  understanding of the HEP provided.   See EMR under Patient Instructions for exercises provided prior visit.     Assessment   Patient noting a return of higher pain levels which may be due to no longer taking Medrol dose pack. Post heat and massage patient able to actively flex little. Pain with massage has returned at little proximal phalanx with patient jumping with shocking pain. During session noted tingling for some time in all fingers. By end of session noted usual tingling at ring and little finger persisted. He noted a decrease in pain and increase mobility by end of session. Pt  will continue to benefit from skilled OT to further address pain and deficits in ROM and strength which are hindering ability to perform self care and IADLs. Patient's cultural,spiritual, and educational needs were considered  Goals:  Short Term Goals  Patient with increase in ROM and decrease in pain allowing for increase use of right for to feed self with greater ease in 2 weeks-met  Patient with decrease in pain with palpation in 3 weeks-ongoing  Patient able to use right with greater ease to bathe self in 3 weeks-met  Patient with increase in ROM and decrease in pain allowing for increase in use for folding of clothes in 4 weeks-met  Long Term Goals  Patient with decrease in pain and increase in motion and strength allowing for greater ease with meal preparation in 5 weeks-ongoing  Patient able to use right to help operate lawnmower in 5 weeks-ongoing  Patient able to perform sweeping and mopping with increase use of right for greater ease in performance in 6 weeks-ongoing  Improve FOTO score by 15 points indicating improved function of right hand in 8 weeks-ongoing  Plan   Continue skilled therapy 2 times a week for 3 weeks then may decrease to 1 time a week for 2 weeks including therapeutic  exercises and activities, manual therapy, and pain modalities, adjust orthotic as needed   next session:      NELY Hines

## 2022-10-27 ENCOUNTER — CLINICAL SUPPORT (OUTPATIENT)
Dept: REHABILITATION | Facility: HOSPITAL | Age: 67
End: 2022-10-27
Payer: MEDICARE

## 2022-10-27 DIAGNOSIS — Z98.890 HX OF HAND SURGERY: Primary | ICD-10-CM

## 2022-10-27 PROCEDURE — 97140 MANUAL THERAPY 1/> REGIONS: CPT | Mod: KX,PN

## 2022-10-27 PROCEDURE — 97530 THERAPEUTIC ACTIVITIES: CPT | Mod: KX,PN

## 2022-10-27 PROCEDURE — 97022 WHIRLPOOL THERAPY: CPT | Mod: KX,PN

## 2022-10-27 PROCEDURE — 97110 THERAPEUTIC EXERCISES: CPT | Mod: KX,PN

## 2022-10-27 NOTE — PROGRESS NOTES
[facility] Occupational Therapy Daily Treatment Note   Date: 10/27/2022  Name: Aleksandar Knight Sr.  Clinic Number: [id number]  Therapy Diagnosis: Right hand pain and stiffness  Physician: Rafi Andre MD  Physician Orders: OT for RT hand, 2 x week for 4 weeks   Medical Diagnosis: Z98.890 (ICD-10-CM) - Hx of hand surgery M72.0 (ICD-10-CM) - Dupuytren's contracture of right hand   Surgical Procedure and Date: 7/27/2022; Right small finger Dupuytren's release open  Evaluation Date: 8/29/2022  Insurance Authorization Period Expiration: 10/23/22  Plan of Care Certification Period: 10/30/22 extend to 11/15/22 (updated 10/04)  Date of Return to MD: 11/1/22  Visit # / Visits authorized: Eval 1 / 1 12/12 (including eval)  4/pending  Time In:6:50  Time Out: 7:45  Total Billable Time: 55 minutes   Precautions:  Standard     post op     Subjective   Pt reports: My hand is better. Not hurting like it was and can move my fingers without it hurting so much. Still can't make a fist or straighten my finger out all the way.   was compliant with home exercise program given    Response to previous treatment: better   Functional change: as above  Pain: /10 start of session  Location: right hand   FOTO score: Eval 8/29  36%  Goal 68%;  9/15  42 %     Objective   Patient received the following treatment:   Supervised modalities after being cleared for contradictions for 10 minutes including:      Fluidotherapy to right hand for 10 minutes to increase tissue elasticity, desensitization, sensory re-education and for pain management. Air flow 80  heat 112 degrees   Manual therapy techniques to increase joint mobilization and soft tissue mobilization for 8 minutes including:    -Scar massage to ulnar volar aspect of hand for 8 minutes  to decrease dense adhesions and improve tensile glide.   Therapeutic activities to improve functional performance with use of dynamic activities for 8 minutes  including:   -GREEN   "theraputty grasp with ulnar aspect of hand and pull x 20 repetitions   -GREEN theraputty "cookie cutting" with 1" cardboard dowel board-having to use thumb to help depress today  -Flexbar RED towel wringing   Therapeutic exercises to improve functional performance while increasing strength, endurance, ROM, and flexibility for 29  minutes, including:  -Active assistive/Passive range of motion of each joint (all fingers) into flexion and extension and composite of each  -therapist holding of MPs in flexion as patient actively fisted x 5 repetitions   -fingers extension off table top isolated and composite 5 repetitions each  -little finger abduction/adduction 10 repetitions  -GREEN putty  gripping x 2 minutes  -place and hold into fist x 5 repetitions   -Gripper 35# 15 repetitions then  decreased to 25# x 15 repetitions    Instruction and demonstration given of all introduced   Patient required moderate cuing for correct performance of exercise.    Home Exercises and Education Provided   Education provided:  - discussed insurance limitation  - Progress towards goals     Written Home Exercises Provided: 10/20 Encouraged sitting with left hand over right to provide stretch into extension while sedentary. 9/07/22 Added: in hand manipulation of objects ~ 2 minutes and flicking of ring and little fingers as above 10 repetitions each 2 times a day. Patient was fitted with a piece of silicone gel sheeting for scar management to be worn under the orthotic. He took a picture of the instructions and precautions and therapist reviewed them with him. Reviewed orthotic guidelines: care (clean with soap and water and keep away from heat source), wear (for 1 hour 3 times during the day and wear while sleeping), precautions (increase in swelling, pain and pressure areas). Patient to call therapist if any problems arise with orthotic for proper adjustments to be made.   Patient instructed to cont prior HEP. Exercises were reviewed and " he was able to demonstrate them prior to the end of the session. he demonstrated good  understanding of the HEP provided.   See EMR under Patient Instructions for exercises provided prior visit.     Assessment   Patient noting decrease in pain back to levels a week prior. He is able to move with much less pain . Limitations in range persisting. He continues to be very sensitive along the scar especially at proximal phalanx. He was able to tolerate flexbar today, unable previously. Pt  will continue to benefit from skilled OT to further address pain and deficits in ROM and strength which are hindering ability to perform self care and IADLs. Patient's cultural,spiritual, and educational needs were considered  Goals:  Short Term Goals  Patient with increase in ROM and decrease in pain allowing for increase use of right for to feed self with greater ease in 2 weeks-met  Patient with decrease in pain with palpation in 3 weeks-ongoing  Patient able to use right with greater ease to bathe self in 3 weeks-met  Patient with increase in ROM and decrease in pain allowing for increase in use for folding of clothes in 4 weeks-met  Long Term Goals  Patient with decrease in pain and increase in motion and strength allowing for greater ease with meal preparation in 5 weeks-ongoing  Patient able to use right to help operate lawnmower in 5 weeks-ongoing  Patient able to perform sweeping and mopping with increase use of right for greater ease in performance in 6 weeks-ongoing  Improve FOTO score by 15 points indicating improved function of right hand in 8 weeks-ongoing  Plan   Continue skilled therapy 2 times a week for 3 weeks then may decrease to 1 time a week for 2 weeks including therapeutic exercises and activities, manual therapy, and pain modalities, adjust orthotic as needed   next session:      NELY Hines

## 2022-11-01 ENCOUNTER — OFFICE VISIT (OUTPATIENT)
Dept: ORTHOPEDICS | Facility: CLINIC | Age: 67
End: 2022-11-01
Payer: MEDICARE

## 2022-11-01 ENCOUNTER — CLINICAL SUPPORT (OUTPATIENT)
Dept: REHABILITATION | Facility: HOSPITAL | Age: 67
End: 2022-11-01
Payer: MEDICARE

## 2022-11-01 VITALS — BODY MASS INDEX: 32.14 KG/M2 | WEIGHT: 200 LBS | HEIGHT: 66 IN

## 2022-11-01 DIAGNOSIS — M72.0 DUPUYTREN'S CONTRACTURE OF RIGHT HAND: ICD-10-CM

## 2022-11-01 DIAGNOSIS — Z98.890 HX OF HAND SURGERY: Primary | ICD-10-CM

## 2022-11-01 PROCEDURE — 1125F AMNT PAIN NOTED PAIN PRSNT: CPT | Mod: CPTII,S$GLB,, | Performed by: ORTHOPAEDIC SURGERY

## 2022-11-01 PROCEDURE — 1101F PR PT FALLS ASSESS DOC 0-1 FALLS W/OUT INJ PAST YR: ICD-10-PCS | Mod: CPTII,S$GLB,, | Performed by: ORTHOPAEDIC SURGERY

## 2022-11-01 PROCEDURE — 1125F PR PAIN SEVERITY QUANTIFIED, PAIN PRESENT: ICD-10-PCS | Mod: CPTII,S$GLB,, | Performed by: ORTHOPAEDIC SURGERY

## 2022-11-01 PROCEDURE — 1159F PR MEDICATION LIST DOCUMENTED IN MEDICAL RECORD: ICD-10-PCS | Mod: CPTII,S$GLB,, | Performed by: ORTHOPAEDIC SURGERY

## 2022-11-01 PROCEDURE — 97140 MANUAL THERAPY 1/> REGIONS: CPT | Mod: KX,PN

## 2022-11-01 PROCEDURE — 3288F FALL RISK ASSESSMENT DOCD: CPT | Mod: CPTII,S$GLB,, | Performed by: ORTHOPAEDIC SURGERY

## 2022-11-01 PROCEDURE — 99213 OFFICE O/P EST LOW 20 MIN: CPT | Mod: S$GLB,,, | Performed by: ORTHOPAEDIC SURGERY

## 2022-11-01 PROCEDURE — 4010F ACE/ARB THERAPY RXD/TAKEN: CPT | Mod: CPTII,S$GLB,, | Performed by: ORTHOPAEDIC SURGERY

## 2022-11-01 PROCEDURE — 3066F NEPHROPATHY DOC TX: CPT | Mod: CPTII,S$GLB,, | Performed by: ORTHOPAEDIC SURGERY

## 2022-11-01 PROCEDURE — 99213 PR OFFICE/OUTPT VISIT, EST, LEVL III, 20-29 MIN: ICD-10-PCS | Mod: S$GLB,,, | Performed by: ORTHOPAEDIC SURGERY

## 2022-11-01 PROCEDURE — 97022 WHIRLPOOL THERAPY: CPT | Mod: KX,PN

## 2022-11-01 PROCEDURE — 3061F PR NEG MICROALBUMINURIA RESULT DOCUMENTED/REVIEW: ICD-10-PCS | Mod: CPTII,S$GLB,, | Performed by: ORTHOPAEDIC SURGERY

## 2022-11-01 PROCEDURE — 3061F NEG MICROALBUMINURIA REV: CPT | Mod: CPTII,S$GLB,, | Performed by: ORTHOPAEDIC SURGERY

## 2022-11-01 PROCEDURE — 97110 THERAPEUTIC EXERCISES: CPT | Mod: KX,PN

## 2022-11-01 PROCEDURE — 3288F PR FALLS RISK ASSESSMENT DOCUMENTED: ICD-10-PCS | Mod: CPTII,S$GLB,, | Performed by: ORTHOPAEDIC SURGERY

## 2022-11-01 PROCEDURE — 4010F PR ACE/ARB THEARPY RXD/TAKEN: ICD-10-PCS | Mod: CPTII,S$GLB,, | Performed by: ORTHOPAEDIC SURGERY

## 2022-11-01 PROCEDURE — 1101F PT FALLS ASSESS-DOCD LE1/YR: CPT | Mod: CPTII,S$GLB,, | Performed by: ORTHOPAEDIC SURGERY

## 2022-11-01 PROCEDURE — 1159F MED LIST DOCD IN RCRD: CPT | Mod: CPTII,S$GLB,, | Performed by: ORTHOPAEDIC SURGERY

## 2022-11-01 PROCEDURE — 3066F PR DOCUMENTATION OF TREATMENT FOR NEPHROPATHY: ICD-10-PCS | Mod: CPTII,S$GLB,, | Performed by: ORTHOPAEDIC SURGERY

## 2022-11-01 RX ORDER — METHYLPREDNISOLONE 4 MG/1
TABLET ORAL
Qty: 1 EACH | Refills: 0 | Status: SHIPPED | OUTPATIENT
Start: 2022-11-01 | End: 2023-05-15

## 2022-11-01 NOTE — PROGRESS NOTES
[facility]    Subjective:     Chief Complaint:   Chief Complaint   Patient presents with    Right Hand - Pain     Follow up RT Dupuytrens release 7/27/22 PT f/u, therapy is helping a lot, pain is off and on, c/o weakness       Past Medical History:   Diagnosis Date    Dupuytren contracture 2022    Hyperlipidemia     Hypertension        Past Surgical History:   Procedure Laterality Date    COLONOSCOPY N/A 11/20/2019    Procedure: COLONOSCOPY;  Surgeon: Nabeel Hart MD;  Location: [facility] ENDO;  Service: Endoscopy;  Laterality: N/A;    DUPUYTREN CONTRACTURE RELEASE Right 7/27/2022    Procedure: RELEASE, DUPUYTREN CONTRACTURE;  Surgeon: Rafi Andre MD;  Location: [facility] OR;  Service: Orthopedics;  Laterality: Right;       Current Outpatient Medications   Medication Sig    amlodipine-benazepril 10-20mg (LOTREL) 10-20 mg per capsule Take 1 capsule by mouth once daily.    atorvastatin (LIPITOR) 40 MG tablet Take 1 tablet (40 mg total) by mouth once daily. For cholesterol    methylPREDNISolone (MEDROL DOSEPACK) 4 mg tablet use as directed    tamsulosin (FLOMAX) 0.4 mg Cap Take 1 capsule (0.4 mg total) by mouth once daily.     No current facility-administered medications for this visit.       Review of patient's allergies indicates:  No Known Allergies    Family History   Problem Relation Age of Onset    Cancer Father         unkn type       Social History     Socioeconomic History    Marital status: Single   Tobacco Use    Smoking status: Never    Smokeless tobacco: Never   Substance and Sexual Activity    Alcohol use: Never    Drug use: Never       History of present illness:  67-year-old male returns to clinic today for follow-up for his right hand Dupuytren's contracture excision.  He is just over 3 months postop.  He has been actively participating in occupational therapy.  He is pleased with the progress he is making there, however he is still quite tender/sensitive over the operative site and has  reduced range of motion.    After his last visit he was given a Medrol Dosepak he states this helped a great deal with his symptoms.  Also helped with some progression in physical therapy.      Review of Systems:    Constitution: Negative for chills, fever, and sweats.  Negative for unexplained weight loss.    HENT:  Negative for headaches and blurry vision.    Cardiovascular:Negative for chest pain or irregular heart beat. Negative for hypertension.    Respiratory:  Negative for cough and shortness of breath.    Gastrointestinal: Negative for abdominal pain, heartburn, melena, nausea, and vomitting.    Genitourinary:  Negative bladder incontinence and dysuria.    Musculoskeletal:  See HPI for details.     Neurological: Negative for numbness.    Psychiatric/Behavioral: Negative for depression.  The patient is not nervous/anxious.      Endocrine: Negative for polyuria    Hematologic/Lymphatic: Negative for bleeding problem.  Does not bruise/bleed easily.    Skin: Negative for poor would healing and rash    Objective:      Physical Examination:    Vital Signs:  There were no vitals filed for this visit.    Body mass index is 32.28 kg/m².    This a well-developed, well nourished patient in no acute distress.  They are alert and oriented and cooperative to examination.        Examination of the right hand,  skin right hand is clean dry and intact.  There is no erythema or ecchymosis.  Right hand palmar incision is well healed without wound dehiscence or drainage.  There are no signs or symptoms of infection.  He still has a quite tender to palpation area about the distal aspect of his surgical incision.  It is somewhat hypertrophied or keloidal.  He has hypersensitive to palpation in an RSD type fashion.  He cannot fully close his right hand into a fist.  He cannot fully extend to open his hand, extension is the most painful.     This is all secondary to pain at the pinky finger with the surgery occurred.  He can oppose  "his right thumb to all digits in his right hand.  He is neurovascularly intact throughout the right upper extremity.    Pertinent New Results:    XRAY Report / Interpretation:       Assessment/Plan:      67-year-old male who had a right little finger or 5th finger Dupuytren's release about 3 months ago.  He had a little excess scar formation and was having some difficulty with range of motion.  He responded well since his last visit with a Medrol Dosepak and continued occupational therapy.  Will repeat that and check him back in another 6 weeks.  Would like to avoid repeat surgery are scar excision at this point as I will be afraid that the inflammation and the gains that we seen postoperatively now in his recovery would return.    Dieter Zhang, Physician Assistant, served in the capacity as a "scribe" for this patient encounter.  A "face-to-face" encounter occurred with Dr. Rafi Andre on this date.  The treatment plan and medical decision-making is outlined above. Patient was seen and examined with a chaperone.       This note was created using Dragon voice recognition software that occasionally misinterpreted phrases or words.          "

## 2022-11-01 NOTE — PROGRESS NOTES
[facility] Occupational Therapy Daily Treatment Note   Date: 11/1/2022  Name: Aleksandar Knight Sr.  Clinic Number: [id number]  Therapy Diagnosis: Right hand pain and stiffness  Physician: Rafi Andre MD  Physician Orders: OT for RT hand, 2 x week for 4 weeks   Medical Diagnosis: Z98.890 (ICD-10-CM) - Hx of hand surgery M72.0 (ICD-10-CM) - Dupuytren's contracture of right hand   Surgical Procedure and Date: 7/27/2022; Right small finger Dupuytren's release open  Evaluation Date: 8/29/2022  Insurance Authorization Period Expiration: 10/23/22  Plan of Care Certification Period: 10/30/22 extend to 11/15/22 (updated 10/04)  Date of Return to MD: 11/1/22  Visit # / Visits authorized: Eval 1 / 1 12/12 (including eval)  5/pending  Time In:6:55  Time Out: 7:45  Total Billable Time: 50 minutes   Precautions:  Standard     post op     Subjective   Pt reports: Still getting those sharp pains usually at night. The numbness changes, sometimes all fingers but all the time almost the whole little finger indicating from tip of finger along lateral aspect of finger.   was compliant with home exercise program given    Response to previous treatment: better   Functional change: as above  Pain: 4/10 on average  Location: right hand   FOTO score: Eval 8/29  36%  Goal 68%;  9/15  42 %     Objective     LITTLE  MP    0 -70/0-90;    PIP   25-80/0-85;   DIP 0-50/0-65                                60                          80                        55     in lbs  3 trials 45  45 40    Ave 43#  Patient received the following treatment:   Supervised modalities after being cleared for contradictions for 10 minutes including:      Fluidotherapy to right hand for 10 minutes to increase tissue elasticity, desensitization, sensory re-education and for pain management. Air flow 80  heat 112 degrees   Manual therapy techniques to increase joint mobilization and soft tissue mobilization for 8 minutes including:     "-Scar massage to ulnar volar aspect of hand for 8 minutes  to decrease dense adhesions and improve tensile glide.   Therapeutic activities to improve functional performance with use of dynamic activities for 3 minutes  including:   --GREEN theraputty "cookie cutting" with 1" cardboard dowel board-having to use thumb to help depress today  Therapeutic exercises to improve functional performance while increasing strength, endurance, ROM, and flexibility for 29  minutes, including:  -Active assistive/Passive range of motion of each joint (all fingers) into flexion and extension and composite of each  -therapist holding of MPs in flexion as patient actively fisted x 5 repetitions   -fingers extension off table top isolated and composite 5 repetitions each  -little finger abduction/adduction 10 repetitions  -GREEN putty  gripping x 2 minutes  -place and hold into fist x 5 repetitions     Instruction and demonstration given of all introduced   Patient required moderate cuing for correct performance of exercise.    Home Exercises and Education Provided   Education provided:  - discussed insurance limitation  - Progress towards goals     Written Home Exercises Provided: 10/20 Encouraged sitting with left hand over right to provide stretch into extension while sedentary. 9/07/22 Added: in hand manipulation of objects ~ 2 minutes and flicking of ring and little fingers as above 10 repetitions each 2 times a day. Patient was fitted with a piece of silicone gel sheeting for scar management to be worn under the orthotic. He took a picture of the instructions and precautions and therapist reviewed them with him. Reviewed orthotic guidelines: care (clean with soap and water and keep away from heat source), wear (for 1 hour 3 times during the day and wear while sleeping), precautions (increase in swelling, pain and pressure areas). Patient to call therapist if any problems arise with orthotic for proper adjustments to be made.   " Patient instructed to cont prior HEP. Exercises were reviewed and he was able to demonstrate them prior to the end of the session. he demonstrated good  understanding of the HEP provided.   See EMR under Patient Instructions for exercises provided prior visit.     Assessment   A week ago patient experienced high levels of pain that hindered use of hand or use of splint. By last Thurs pain back to levels a week prior. He is able to move with much less pain . Limitations in range persisting. He continues to be very sensitive along the scar especially at proximal phalanx. His  strength is continuing to improve but with increase pain in  tightly with the little finger.  Pt  will continue to benefit from skilled OT to further address pain and deficits in ROM and strength which are hindering ability to perform self care and IADLs. Patient's cultural,spiritual, and educational needs were considered  Goals:  Short Term Goals  Patient with increase in ROM and decrease in pain allowing for increase use of right for to feed self with greater ease in 2 weeks-met  Patient with decrease in pain with palpation in 3 weeks-ongoing  Patient able to use right with greater ease to bathe self in 3 weeks-met  Patient with increase in ROM and decrease in pain allowing for increase in use for folding of clothes in 4 weeks-met  Long Term Goals  Patient with decrease in pain and increase in motion and strength allowing for greater ease with meal preparation in 5 weeks-ongoing  Patient able to use right to help operate lawnmower in 5 weeks-ongoing  Patient able to perform sweeping and mopping with increase use of right for greater ease in performance in 6 weeks-ongoing  Improve FOTO score by 15 points indicating improved function of right hand in 8 weeks-ongoing  Plan   Continue skilled therapy 2 times a week for 3 weeks then may decrease to 1 time a week for 2 weeks including therapeutic exercises and activities, manual therapy, and  pain modalities, adjust orthotic as needed   next session:      NELY Hines

## 2022-11-03 ENCOUNTER — CLINICAL SUPPORT (OUTPATIENT)
Dept: REHABILITATION | Facility: HOSPITAL | Age: 67
End: 2022-11-03
Payer: MEDICARE

## 2022-11-03 DIAGNOSIS — Z98.890 HX OF HAND SURGERY: Primary | ICD-10-CM

## 2022-11-03 PROCEDURE — 97140 MANUAL THERAPY 1/> REGIONS: CPT | Mod: KX,PN

## 2022-11-03 PROCEDURE — 97110 THERAPEUTIC EXERCISES: CPT | Mod: KX,PN

## 2022-11-03 PROCEDURE — 97022 WHIRLPOOL THERAPY: CPT | Mod: KX,PN

## 2022-11-03 NOTE — PROGRESS NOTES
[facility] Occupational Therapy Daily Treatment Note   Date: 11/3/2022  Name: Aleksandar Knight Sr.  Clinic Number: [id number]  Therapy Diagnosis: Right hand pain and stiffness  Physician: Rafi Andre MD  Physician Orders: OT for RT hand, 2 x week for 4 weeks   Medical Diagnosis: Z98.890 (ICD-10-CM) - Hx of hand surgery M72.0 (ICD-10-CM) - Dupuytren's contracture of right hand   Surgical Procedure and Date: 7/27/2022; Right small finger Dupuytren's release open  Evaluation Date: 8/29/2022  Insurance Authorization Period Expiration: 10/23/22  Plan of Care Certification Period: 10/30/22 extend to 11/15/22 (updated 10/04)  Date of Return to MD: 12/13/22  Visit # / Visits authorized: Eval 1 / 1 12/12 (including eval)  6/pending  Time In:6:50  Time Out: 7:45  Total Billable Time: 55 minutes   Precautions:  Standard     post op     Subjective   Pt reports: Doctor ordered me another round of the steroids and said to continue with therapy. Used a  yesterday for just a few minutes but I wasn't able to lift my thumb to release the trigger. My neighbor opened my fingers and took it out of my hand.     was compliant with home exercise program given    Response to previous treatment: better   Functional change: as above  Pain: 4/10 up to a 6/10 by end of session  Location: right hand   FOTO score: Eval 8/29  36%  Goal 68%;  9/15  42 %     Objective    Patient received the following treatment:   Supervised modalities after being cleared for contradictions for 10 minutes including:      Fluidotherapy to right hand for 10 minutes to increase tissue elasticity, desensitization, sensory re-education and for pain management. Air flow 80  heat 112 degrees   Manual therapy techniques to increase joint mobilization and soft tissue mobilization for 8 minutes including:    -Scar massage to ulnar volar aspect of hand for 8 minutes  to decrease dense adhesions and improve tensile glide.   Therapeutic  "activities to improve functional performance with use of dynamic activities for 5 minutes  including:   --GREEN theraputty "cookie cutting" with 1"  dowel -having to use thumb to help depress  _GREEN theraputty pulling vertically and upward and downward 10 repetitions each   Therapeutic exercises to improve functional performance while increasing strength, endurance, ROM, and flexibility for 32  minutes, including:  -Active assistive/Passive range of motion of each joint (all fingers) into flexion and extension and composite of each  -therapist holding of MPs in flexion as patient actively fisted x 5 repetitions   -fingers extension off table top isolated and composite 5 repetitions each  -little finger abduction/adduction with YELLOW rubberband resistance x 15 repetitions  -GREEN putty  gripping x 2 minutes  -place and hold into fist x 5 repetitions   -Gripper 50# 17 repetitions 35# 10 eps     Instruction and demonstration given of all introduced   Patient required moderate cuing for correct performance of exercise.    Home Exercises and Education Provided   Education provided:  - discussed insurance limitation  - Progress towards goals     Written Home Exercises Provided: 10/20 Encouraged sitting with left hand over right to provide stretch into extension while sedentary. 9/07/22 Added: in hand manipulation of objects ~ 2 minutes and flicking of ring and little fingers as above 10 repetitions each 2 times a day. Patient was fitted with a piece of silicone gel sheeting for scar management to be worn under the orthotic. He took a picture of the instructions and precautions and therapist reviewed them with him. Reviewed orthotic guidelines: care (clean with soap and water and keep away from heat source), wear (for 1 hour 3 times during the day and wear while sleeping), precautions (increase in swelling, pain and pressure areas). Patient to call therapist if any problems arise with orthotic for proper adjustments to " be made.   Patient instructed to cont prior HEP. Exercises were reviewed and he was able to demonstrate them prior to the end of the session. he demonstrated good  understanding of the HEP provided.   See EMR under Patient Instructions for exercises provided prior visit.     Assessment   Patient noting pain increased with exercise but able to achieve greater range by end of session. He is to start another round of steriods. He noted use of  caused patient's hand to cramp on it and was unable to release it.   Pt  will continue to benefit from skilled OT to further address pain and deficits in ROM and strength which are hindering ability to perform self care and IADLs. Patient's cultural,spiritual, and educational needs were considered  Goals:  Short Term Goals  Patient with increase in ROM and decrease in pain allowing for increase use of right for to feed self with greater ease in 2 weeks-met  Patient with decrease in pain with palpation in 3 weeks-ongoing  Patient able to use right with greater ease to bathe self in 3 weeks-met  Patient with increase in ROM and decrease in pain allowing for increase in use for folding of clothes in 4 weeks-met  Long Term Goals  Patient with decrease in pain and increase in motion and strength allowing for greater ease with meal preparation in 5 weeks-ongoing  Patient able to use right to help operate lawnmower in 5 weeks-ongoing  Patient able to perform sweeping and mopping with increase use of right for greater ease in performance in 6 weeks-ongoing  Improve FOTO score by 15 points indicating improved function of right hand in 8 weeks-ongoing  Plan   Continue skilled therapy 2 times a week for 3 weeks then may decrease to 1 time a week for 2 weeks including therapeutic exercises and activities, manual therapy, and pain modalities, adjust orthotic as needed   next session:      NELY Hines

## 2022-11-08 ENCOUNTER — CLINICAL SUPPORT (OUTPATIENT)
Dept: REHABILITATION | Facility: HOSPITAL | Age: 67
End: 2022-11-08
Payer: MEDICARE

## 2022-11-08 DIAGNOSIS — Z98.890 HX OF HAND SURGERY: Primary | ICD-10-CM

## 2022-11-08 PROCEDURE — 97530 THERAPEUTIC ACTIVITIES: CPT | Mod: KX,PN

## 2022-11-08 PROCEDURE — 97022 WHIRLPOOL THERAPY: CPT | Mod: KX,PN

## 2022-11-08 PROCEDURE — 97110 THERAPEUTIC EXERCISES: CPT | Mod: KX,PN

## 2022-11-08 NOTE — PROGRESS NOTES
"        [facility] Occupational Therapy Daily Treatment Note   Date: 11/8/2022  Name: Aleksandar Knight Sr.  Clinic Number: [id number]  Therapy Diagnosis: Right hand pain and stiffness  Physician: Rafi Andre MD  Physician Orders: OT for RT hand, 2 x week for 4 weeks   Medical Diagnosis: Z98.890 (ICD-10-CM) - Hx of hand surgery M72.0 (ICD-10-CM) - Dupuytren's contracture of right hand   Surgical Procedure and Date: 7/27/2022; Right small finger Dupuytren's release open  Evaluation Date: 8/29/2022  Insurance Authorization Period Expiration: 10/23/22  Plan of Care Certification Period: 10/30/22 extend to 11/15/22 (updated 10/04)  Date of Return to MD: 12/13/22  Visit # / Visits authorized: Eval 1 / 1 12/12 (including eval)  7/pending  Time In:6:52  Time Out: 7:45  Total Billable Time: 53 minutes   Precautions:  Standard     post op     Subjective   Pt reports:  Started taking those pills again. The pain isn't as bad. Isn't quite as sensitive to touching it.    was compliant with home exercise program given    Response to previous treatment: better   Functional change: as above  Pain: 3/10 up to a 6/10 by end of session  Location: right hand   FOTO score: Eval 8/29  36%  Goal 68%;  9/15  42 %     Objective    Patient received the following treatment:   Supervised modalities after being cleared for contradictions for 10 minutes including:      Fluidotherapy to right hand for 10 minutes to increase tissue elasticity, desensitization, sensory re-education and for pain management. Air flow 80  heat 112 degrees   Manual therapy techniques to increase joint mobilization and soft tissue mobilization for 5 minutes including:    -Scar massage to ulnar volar aspect of hand for 8 minutes  to decrease dense adhesions and improve tensile glide.   Therapeutic activities to improve functional performance with use of dynamic activities for 8 minutes  including:   --GREEN theraputty "cookie cutting" with 1"  dowel " -sharp pain little finger into hand  _GREEN theraputty pulling vertically and upward and downward 10 repetitions each   -GREEN rolling out stretching into extension x 1 minute  -dowel with 4# on string rolled up 5 repetitions   Therapeutic exercises to improve functional performance while increasing strength, endurance, ROM, and flexibility for 30 minutes, including:  -Active assistive/Passive range of motion of each joint (all fingers) into flexion and extension and composite of each  -therapist holding of MPs in flexion as patient actively fisted x 5 repetitions   -fingers extension off table top isolated and composite 5 repetitions each  -little finger abduction/adduction with YELLOW rubberband resistance x 15 repetitions  -GREEN putty  gripping x 2 minutes  -place and hold into fist x 5 repetitions   -Gripper 50# 17 repetitions 35# 10 reps     Instruction and demonstration given of all introduced   Patient required moderate cuing for correct performance of exercise.    Home Exercises and Education Provided   Education provided:  - discussed insurance limitation  - Progress towards goals     Written Home Exercises Provided: 10/20 Encouraged sitting with left hand over right to provide stretch into extension while sedentary. 9/07/22 Added: in hand manipulation of objects ~ 2 minutes and flicking of ring and little fingers as above 10 repetitions each 2 times a day. Patient was fitted with a piece of silicone gel sheeting for scar management to be worn under the orthotic. He took a picture of the instructions and precautions and therapist reviewed them with him. Reviewed orthotic guidelines: care (clean with soap and water and keep away from heat source), wear (for 1 hour 3 times during the day and wear while sleeping), precautions (increase in swelling, pain and pressure areas). Patient to call therapist if any problems arise with orthotic for proper adjustments to be made.   Patient instructed to cont prior HEP.  Exercises were reviewed and he was able to demonstrate them prior to the end of the session. he demonstrated good  understanding of the HEP provided.   See EMR under Patient Instructions for exercises provided prior visit.     Assessment   Patient noting decrease in pain since started steroids again. Continues with area on little that is hypersensitive but of lesser pain. Pt  will continue to benefit from skilled OT to further address pain and deficits in ROM and strength which are hindering ability to perform self care and IADLs. Patient's cultural,spiritual, and educational needs were considered.  Goals:  Short Term Goals  Patient with increase in ROM and decrease in pain allowing for increase use of right for to feed self with greater ease in 2 weeks-met  Patient with decrease in pain with palpation in 3 weeks-ongoing  Patient able to use right with greater ease to bathe self in 3 weeks-met  Patient with increase in ROM and decrease in pain allowing for increase in use for folding of clothes in 4 weeks-met  Long Term Goals  Patient with decrease in pain and increase in motion and strength allowing for greater ease with meal preparation in 5 weeks-ongoing  Patient able to use right to help operate lawnmower in 5 weeks-ongoing  Patient able to perform sweeping and mopping with increase use of right for greater ease in performance in 6 weeks-ongoing  Improve FOTO score by 15 points indicating improved function of right hand in 8 weeks-ongoing  Plan   Continue skilled therapy 2 times a week for 3 weeks then may decrease to 1 time a week for 2 weeks including therapeutic exercises and activities, manual therapy, and pain modalities, adjust orthotic as needed   next session:      NELY Hines

## 2022-11-15 ENCOUNTER — CLINICAL SUPPORT (OUTPATIENT)
Dept: REHABILITATION | Facility: HOSPITAL | Age: 67
End: 2022-11-15
Payer: MEDICARE

## 2022-11-15 DIAGNOSIS — Z98.890 HX OF HAND SURGERY: Primary | ICD-10-CM

## 2022-11-15 PROCEDURE — 97022 WHIRLPOOL THERAPY: CPT | Mod: KX,PN

## 2022-11-15 PROCEDURE — 97140 MANUAL THERAPY 1/> REGIONS: CPT | Mod: KX,PN

## 2022-11-15 PROCEDURE — 97110 THERAPEUTIC EXERCISES: CPT | Mod: KX,PN

## 2022-11-15 NOTE — PLAN OF CARE
[facility] Occupational Therapy Daily Treatment/Updated Plan of Care Note   Date: 11/15/2022  Name: Aleksandar Knight .  Clinic Number: [id number]  Therapy Diagnosis: Right hand pain and stiffness  Physician: Rafi Andre MD  Physician Orders: OT for RT hand, 2 x week for 4 weeks   Medical Diagnosis: Z98.890 (ICD-10-CM) - Hx of hand surgery M72.0 (ICD-10-CM) - Dupuytren's contracture of right hand   Surgical Procedure and Date: 7/27/2022; Right small finger Dupuytren's release open  Evaluation Date: 8/29/2022  Insurance Authorization Period Expiration: 10/23/22  Plan of Care Certification Period: 10/30/22 extend to 12/15/22 (updated 11/15)  Date of Return to MD: 12/13/22  Visit # / Visits authorized: Eval 1 / 1 12/12 (including eval)  8/pending  Time In:6:52  Time Out: 7:45  Total Billable Time: 53 minutes   Precautions:  Standard     post op     Subjective   Pt reports:  Finished the medicine last week. It took out about another fourth of the pain. Just still really sore at the base of the little finger by the bend. Still get the sharp pains at night. Don't hold a lot with that hand    was compliant with home exercise program given    Response to previous treatment: better   Functional change: as above  Pain: 3/10 up to a 6/10 by end of session  Location: right hand   FOTO score: Eval 8/29  36%  Goal 68%;  9/15  42 %     Objective   11/01  LITTLE  MP    0 -70/0-90;    PIP   25-80/0-85;   DIP 0-50/0-65  11/15               MP        70/   85;      PIP  20-80/5-95;   DIP   55/65  11/01    in lbs  3 trials 45  45 40    Ave 43#        Patient received the following treatment:   Supervised modalities after being cleared for contradictions for 10 minutes including:      Fluidotherapy to right hand for 10 minutes to increase tissue elasticity, desensitization, sensory re-education and for pain management. Air flow 80  heat 112 degrees   Manual therapy techniques to increase joint  mobilization and soft tissue mobilization for 8 minutes including:    -Scar massage to ulnar volar aspect of hand for 8 minutes  to decrease dense adhesions and improve tensile glide.   Therapeutic activities to improve functional performance with use of dynamic activities for 5 minutes  including:  -gross  on screw  as placed 4 into and out of screw board    -GREEN rolling out stretching into extension x 1 minute  Therapeutic exercises to improve functional performance while increasing strength, endurance, ROM, and flexibility for 30 minutes, including:  -Active assistive/Passive range of motion of each joint (all fingers) into flexion and extension and composite of each  -therapist holding of MPs in flexion as patient actively fisted x 5 repetitions   -fingers extension off table top isolated and composite 5 repetitions each  -little finger abduction/adduction with YELLOW rubberband resistance x 15 repetitions  -GREEN putty  gripping x 2 minutes  -place and hold into fist x 5 repetitions   -Gripper 50# 15 repetitions 35# 10 repetitions 25# 10 repetitions   Instruction and demonstration given of all introduced   Patient required moderate cuing for correct performance of exercise.    Home Exercises and Education Provided   Education provided:  - discussed insurance limitation  - Progress towards goals     Written Home Exercises Provided: 11/15 given GREEN theraputty 10/20 Encouraged sitting with left hand over right to provide stretch into extension while sedentary. 9/07/22 Added: in hand manipulation of objects ~ 2 minutes and flicking of ring and little fingers as above 10 repetitions each 2 times a day. Patient was fitted with a piece of silicone gel sheeting for scar management to be worn under the orthotic. He took a picture of the instructions and precautions and therapist reviewed them with him. Reviewed orthotic guidelines: care (clean with soap and water and keep away from heat source), wear (for  1 hour 3 times during the day and wear while sleeping), precautions (increase in swelling, pain and pressure areas). Patient to call therapist if any problems arise with orthotic for proper adjustments to be made.   Patient instructed to cont prior HEP. Exercises were reviewed and he was able to demonstrate them prior to the end of the session. he demonstrated good  understanding of the HEP provided.   See EMR under Patient Instructions for exercises provided prior visit.     Assessment   Patient noting further decrease in pain and area of sharp pain has also localized more to base of little finger. During session he did experience up to 8/10 pain with passive little Proximal interphalangeal joint stretch into extension. His range of motion has improved 5 degrees in past 2 weeks at Distal interphalangeal joint flexion and 10 degrees in passive flexion of Proximal interphalangeal joint. He is able to hold tight fist momentarily once placed. Continues with weakness at hand.  Pt  will continue to benefit from skilled OT to further address pain and deficits in ROM and strength which are hindering ability to perform self care and IADLs. Patient's cultural,spiritual, and educational needs were considered.  Goals:  Short Term Goals  Patient with increase in ROM and decrease in pain allowing for increase use of right for to feed self with greater ease in 2 weeks-met  Patient with decrease in pain with palpation in 3 weeks-ongoing  Patient able to use right with greater ease to bathe self in 3 weeks-met  Patient with increase in ROM and decrease in pain allowing for increase in use for folding of clothes in 4 weeks-met  Long Term Goals  Patient with decrease in pain and increase in motion and strength allowing for greater ease with meal preparation in 5 weeks-ongoing  Patient able to use right to help operate lawnmower in 5 weeks-ongoing  Patient able to perform sweeping and mopping with increase use of right for greater  ease in performance in 6 weeks-ongoing  Improve FOTO score by 15 points indicating improved function of right hand in 8 weeks-ongoing  Plan   Continue skilled therapy 2 times a week for 3 weeks then may decrease to 1 time a week for 2 weeks including therapeutic exercises and activities, manual therapy, and pain modalities, adjust orthotic as needed    NELY Hines

## 2022-11-17 ENCOUNTER — CLINICAL SUPPORT (OUTPATIENT)
Dept: REHABILITATION | Facility: HOSPITAL | Age: 67
End: 2022-11-17
Payer: MEDICARE

## 2022-11-17 DIAGNOSIS — Z98.890 HX OF HAND SURGERY: Primary | ICD-10-CM

## 2022-11-17 PROCEDURE — 97530 THERAPEUTIC ACTIVITIES: CPT | Mod: KX,PN

## 2022-11-17 PROCEDURE — 97110 THERAPEUTIC EXERCISES: CPT | Mod: KX,PN

## 2022-11-17 PROCEDURE — 97124 MASSAGE THERAPY: CPT | Mod: KX,PN

## 2022-11-17 PROCEDURE — 97022 WHIRLPOOL THERAPY: CPT | Mod: KX,PN

## 2022-11-17 NOTE — PROGRESS NOTES
[facility] Occupational Therapy Daily Treatment Note   Date: 11/17/2022  Name: Aleksandar Knight Sr.  Clinic Number: [id number]  Therapy Diagnosis: Right hand pain and stiffness  Physician: Rafi Andre MD  Physician Orders: OT for RT hand, 2 x week for 4 weeks   Medical Diagnosis: Z98.890 (ICD-10-CM) - Hx of hand surgery M72.0 (ICD-10-CM) - Dupuytren's contracture of right hand   Surgical Procedure and Date: 7/27/2022; Right small finger Dupuytren's release open  Evaluation Date: 8/29/2022  Insurance Authorization Period Expiration: 10/23/22  Plan of Care Certification Period: 10/30/22 extend to 12/15/22 (updated 11/15)  Date of Return to MD: 12/13/22  Visit # / Visits authorized: Eval 1 / 1 12/12 (including eval)  9/pending  Time In:6:50  Time Out: 7:45  Total Billable Time: 55 minutes   Precautions:  Standard     post op     Subjective   Pt reports:Have about 25% of the pain still in it. As passively extending little noted up to 5/10 pain. Unable to lift anything heavy and unable to sustain tight  on handles ie hammer.     was compliant with home exercise program given    Response to previous treatment: better   Functional change: as above  Pain: ranges from 2.5 to 4-5/10   Location: right hand   FOTO score: Eval 8/29  36%  Goal 68%;  9/15  42 %     Objective    Patient received the following treatment:   Supervised modalities after being cleared for contradictions for 10 minutes including:      Fluidotherapy to right hand for 10 minutes to increase tissue elasticity, desensitization, sensory re-education and for pain management. Air flow 80  heat 112 degrees   Manual therapy techniques to increase joint mobilization and soft tissue mobilization for 8 minutes including:    -Scar massage to ulnar volar aspect of hand for 8 minutes  to decrease dense adhesions and improve tensile glide.   Therapeutic activities to improve functional performance with use of dynamic activities for 8 minutes   "including:   -GREEN rolling out stretching into extension x 1 minute  -GREEN theraputty "cookie cutting" with 1/2" dowel with gross grasp on it  -dowel with 4# on string rolled up 5 repetitions   -Flexbar GREEN simulated towel wringing 20 repetitions each direction   Therapeutic exercises to improve functional performance while increasing strength, endurance, ROM, and flexibility for 29 minutes, including:  -Active assistive/Passive range of motion of each joint (all fingers) into flexion and extension and composite of each  -therapist holding of MPs in flexion as patient actively fisted x 5 repetitions   -fingers extension off table top isolated and composite 5 repetitions each  -little finger abduction/adduction with YELLOW rubberband resistance x 15 repetitions  -place and hold into fist x 5 repetitions   -Gripper 50# 15 repetitions 35# 10 repetitions 25# 10 repetitions   Instruction and demonstration given of all introduced   Patient required moderate cuing for correct performance of exercise.     Home Exercises and Education Provided   Education provided:  - discussed insurance limitation  - Progress towards goals      Written Home Exercises Provided: 11/15 given GREEN theraputty 10/20 Encouraged sitting with left hand over right to provide stretch into extension while sedentary. 9/07/22 Added: in hand manipulation of objects ~ 2 minutes and flicking of ring and little fingers as above 10 repetitions each 2 times a day. Patient was fitted with a piece of silicone gel sheeting for scar management to be worn under the orthotic. He took a picture of the instructions and precautions and therapist reviewed them with him. Reviewed orthotic guidelines: care (clean with soap and water and keep away from heat source), wear (for 1 hour 3 times during the day and wear while sleeping), precautions (increase in swelling, pain and pressure areas). Patient to call therapist if any problems arise with orthotic for proper " adjustments to be made.   Patient instructed to cont prior HEP. Exercises were reviewed and he was able to demonstrate them prior to the end of the session. he demonstrated good  understanding of the HEP provided.   See EMR under Patient Instructions for exercises provided prior visit.      Assessment   Patient noting up to 5/10 pain with passive little finger extension. Cramping at fingers with sustained tight gripping on dowel. He continues with limitations in Pt  will daily activities requiring tight gripping or heavy lifting. Patient will continue to benefit from skilled OT to further address pain and deficits in ROM and strength which are hindering ability to perform self care and IADLs. Patient's cultural,spiritual, and educational needs were considered.  Goals:  Short Term Goals  Patient with increase in ROM and decrease in pain allowing for increase use of right for to feed self with greater ease in 2 weeks-met  Patient with decrease in pain with palpation in 3 weeks-ongoing  Patient able to use right with greater ease to bathe self in 3 weeks-met  Patient with increase in ROM and decrease in pain allowing for increase in use for folding of clothes in 4 weeks-met  Long Term Goals  Patient with decrease in pain and increase in motion and strength allowing for greater ease with meal preparation in 5 weeks-ongoing  Patient able to use right to help operate lawnmower in 5 weeks-ongoing  Patient able to perform sweeping and mopping with increase use of right for greater ease in performance in 6 weeks-ongoing  Improve FOTO score by 15 points indicating improved function of right hand in 8 weeks-ongoing  Plan   Continue skilled therapy 2 times a week for 3 weeks then may decrease to 1 time a week for 2 weeks including therapeutic exercises and activities, manual therapy, and pain modalities, adjust orthotic as needed     NELY Hines

## 2022-11-22 ENCOUNTER — CLINICAL SUPPORT (OUTPATIENT)
Dept: REHABILITATION | Facility: HOSPITAL | Age: 67
End: 2022-11-22
Payer: MEDICARE

## 2022-11-22 DIAGNOSIS — Z98.890 HX OF HAND SURGERY: Primary | ICD-10-CM

## 2022-11-22 PROCEDURE — 97110 THERAPEUTIC EXERCISES: CPT | Mod: KX,PN

## 2022-11-22 PROCEDURE — 97022 WHIRLPOOL THERAPY: CPT | Mod: KX,PN

## 2022-11-22 PROCEDURE — 97530 THERAPEUTIC ACTIVITIES: CPT | Mod: KX,PN

## 2022-11-22 NOTE — PROGRESS NOTES
[facility] Occupational Therapy Daily Treatment Note   Date: 11/22/2022  Name: Aleksandar Knight .  Clinic Number: [id number]  Therapy Diagnosis: Right hand pain and stiffness  Physician: Rafi Andre MD  Physician Orders: OT for RT hand, 2 x week for 4 weeks   Medical Diagnosis: Z98.890 (ICD-10-CM) - Hx of hand surgery M72.0 (ICD-10-CM) - Dupuytren's contracture of right hand   Surgical Procedure and Date: 7/27/2022; Right small finger Dupuytren's release open  Evaluation Date: 8/29/2022  Insurance Authorization Period Expiration: 10/23/22  Plan of Care Certification Period: 10/30/22 extend to 12/15/22 (updated 11/15)  Date of Return to MD: 12/13/22  Visit # / Visits authorized: Eval 1 / 1 12/12 (including eval)  10/pending  Time In:6:55  Time Out: 7:45  Total Billable Time: 50 minutes   Precautions:  Standard     post op     Subjective   Pt reports:It's better. Still can't bend it all the way and does hurt some to straighten it. Not sure the surgery did any good.     was compliant with home exercise program given    Response to previous treatment: better   Functional change: as above  Pain: ranges from 2 to 4-5/10 sometimes up to 5-7/10 with straightening it  Location: right hand   FOTO score: Eval 8/29  36%  Goal 68%;  9/15  42 %     Objective    Patient received the following treatment:   Supervised modalities after being cleared for contradictions for 10 minutes including:      Fluidotherapy to right hand for 10 minutes to increase tissue elasticity, desensitization, sensory re-education and for pain management. Air flow 80  heat 112 degrees   Manual therapy techniques to increase joint mobilization and soft tissue mobilization for 4 minutes including:    -Scar massage to ulnar volar aspect of hand for 4 minutes  to decrease dense adhesions and improve tensile glide.   Therapeutic activities to improve functional performance with use of dynamic activities for 8 minutes  including:  ADDED:  "grasping of GREEN putty with ulnar fingers and stretching out x 10 repetitions    -GREEN rolling out stretching into extension x 1 minute  -GREEN theraputty "cookie cutting" with 1/2" dowel with gross grasp on it  -Flexbar GREEN simulated towel wringing 20 repetitions each direction   Therapeutic exercises to improve functional performance while increasing strength, endurance, ROM, and flexibility for 28 minutes, including:  -Active assistive/Passive range of motion of each joint (all fingers) into flexion and extension and composite of each  -therapist holding of MPs in flexion as patient actively fisted x 5 repetitions   -fingers extension off table top isolated and composite 5 repetitions each  -little finger abduction/adduction with YELLOW rubberband resistance x 15 repetitions  ADDED-Proximal interphalangeal joint flexion with yellow rubberband x 20 repetitions   -place and hold into fist x 5 repetitions   -Gripper 50# 10 repetitions 35# 20 repetitions    Instruction and demonstration given of all introduced   Patient required moderate cuing for correct performance of exercise.     Home Exercises and Education Provided   Education provided:  - discussed insurance limitation  - Progress towards goals      Written Home Exercises Provided: 11/15 given GREEN theraputty 10/20 Encouraged sitting with left hand over right to provide stretch into extension while sedentary. 9/07/22 Added: in hand manipulation of objects ~ 2 minutes and flicking of ring and little fingers as above 10 repetitions each 2 times a day. Patient was fitted with a piece of silicone gel sheeting for scar management to be worn under the orthotic. He took a picture of the instructions and precautions and therapist reviewed them with him. Reviewed orthotic guidelines: care (clean with soap and water and keep away from heat source), wear (for 1 hour 3 times during the day and wear while sleeping), precautions (increase in swelling, pain and pressure " areas). Patient to call therapist if any problems arise with orthotic for proper adjustments to be made.   Patient instructed to cont prior HEP. Exercises were reviewed and he was able to demonstrate them prior to the end of the session. he demonstrated good  understanding of the HEP provided.   See EMR under Patient Instructions for exercises provided prior visit.      Assessment   Patient continues to note a decrease in pain, however, continues with up to 7/10 at base of little finger with pressure and with straightening of little finger. Passively able to bring little finger to palm and hold momentarily once placed. He is unable to touch palm actively.  He continues with weakness at flexors. He notes increase in pain with sustained tight gripping. He can fully adduct little finger with great effort but notes unable to feel it touching the ring finger. Patient will continue to benefit from skilled OT to further address pain and deficits in ROM and strength which are hindering ability to perform self care and IADLs. Patient's cultural,spiritual, and educational needs were considered.  Goals:  Short Term Goals  Patient with increase in ROM and decrease in pain allowing for increase use of right for to feed self with greater ease in 2 weeks-met  Patient with decrease in pain with palpation in 3 weeks-ongoing  Patient able to use right with greater ease to bathe self in 3 weeks-met  Patient with increase in ROM and decrease in pain allowing for increase in use for folding of clothes in 4 weeks-met  Long Term Goals  Patient with decrease in pain and increase in motion and strength allowing for greater ease with meal preparation in 5 weeks-ongoing  Patient able to use right to help operate lawnmower in 5 weeks-ongoing  Patient able to perform sweeping and mopping with increase use of right for greater ease in performance in 6 weeks-ongoing  Improve FOTO score by 15 points indicating improved function of right hand in 8  weeks-ongoing  Plan   Continue skilled therapy 2 times a week for 1 week  including therapeutic exercises and activities, manual therapy, and pain modalities, adjust orthotic as needed     NELY Hines

## 2022-12-06 ENCOUNTER — CLINICAL SUPPORT (OUTPATIENT)
Dept: REHABILITATION | Facility: HOSPITAL | Age: 67
End: 2022-12-06
Payer: MEDICARE

## 2022-12-06 DIAGNOSIS — Z98.890 HX OF HAND SURGERY: Primary | ICD-10-CM

## 2022-12-06 PROCEDURE — 97110 THERAPEUTIC EXERCISES: CPT | Mod: KX,PN

## 2022-12-06 PROCEDURE — 97022 WHIRLPOOL THERAPY: CPT | Mod: KX,PN

## 2022-12-06 PROCEDURE — 97140 MANUAL THERAPY 1/> REGIONS: CPT | Mod: KX,PN

## 2022-12-06 NOTE — PROGRESS NOTES
[facility] Occupational Therapy Daily Treatment Note   Date: 12/06/2022  Name: Aleksandar Knight Sr.  Clinic Number: [id number]  Therapy Diagnosis: Right hand pain and stiffness  Physician: Rafi Andre MD  Physician Orders: OT for RT hand, 2 x week for 4 weeks   Medical Diagnosis: Z98.890 (ICD-10-CM) - Hx of hand surgery M72.0 (ICD-10-CM) - Dupuytren's contracture of right hand   Surgical Procedure and Date: 7/27/2022; Right small finger Dupuytren's release open  Evaluation Date: 8/29/2022  Insurance Authorization Period Expiration: 10/23/22  Plan of Care Certification Period: 10/30/22 extend to 12/15/22 (updated 11/15)  Date of Return to MD: 12/13/22  Visit # / Visits authorized: Catinaal 1 / 1 12/12 (including eval)  11/pending  Time In:6:51  Time Out: 7:46  Total Billable Time: 54 minutes   Precautions:  Standard     post op     Subjective   Pt reports:Saturday did 4 digs with a shovel and had to stop. My hand was so painful. Throbbing and going numb. Pain up to 10/10 that night and hasn't come below 4-5/10.  Going numb every night. Get a lot of pain if try to put any pressure with the little finger as performing gripper.   Response to previous treatment: better   Functional change: as above  Pain: ranges from  4-5/10   10/10 with straightening it and at night  Location: right hand   FOTO score: Eval 8/29  36%  Goal 68%;   9/15  42 %     Objective    Patient received the following treatment:   Supervised modalities after being cleared for contradictions for 10 minutes including:      Fluidotherapy to right hand for 10 minutes to increase tissue elasticity, desensitization, sensory re-education and for pain management. Air flow 80  heat 112 degrees   Manual therapy techniques to increase joint mobilization and soft tissue mobilization for 8 minutes including:    -Scar massage to ulnar volar aspect of hand for 5 minutes  to decrease dense adhesions and improve tensile glide. And stretching and  "holding into finger extension   Therapeutic activities to improve functional performance with use of dynamic activities for  3 minutes  including:  -GREEN theraputty depressing into putty with 1" dowel with gross grasp on it  attempted 1/2" -unable to hold onto it  Therapeutic exercises to improve functional performance while increasing strength, endurance, ROM, and flexibility for 33 minutes, including:  -Active assistive/Passive range of motion of each joint (all fingers) into flexion and extension and composite of each  -therapist holding of MPs in flexion as patient actively fisted x 5 repetitions   -fingers extension off table top isolated and composite 5 repetitions each  -little finger abduction/adduction with YELLOW rubberband resistance x 15 repetitions  -Proximal interphalangeal joint flexion with yellow rubberband x 20 repetitions   -place and hold into fist x 5 repetitions   -Gripper 50# 10 repetitions 35# 20 repetitions    Instruction and demonstration given of all introduced   Patient required moderate cuing for correct performance of exercise.     Home Exercises and Education Provided   Education provided:  - discussed insurance limitation  - Progress towards goals      Written Home Exercises Provided: 11/15 given GREEN theraputty 10/20 Encouraged sitting with left hand over right to provide stretch into extension while sedentary. 9/07/22 Added: in hand manipulation of objects ~ 2 minutes and flicking of ring and little fingers as above 10 repetitions each 2 times a day. Patient was fitted with a piece of silicone gel sheeting for scar management to be worn under the orthotic. He took a picture of the instructions and precautions and therapist reviewed them with him. Reviewed orthotic guidelines: care (clean with soap and water and keep away from heat source), wear (for 1 hour 3 times during the day and wear while sleeping), precautions (increase in swelling, pain and pressure areas). Patient to call " therapist if any problems arise with orthotic for proper adjustments to be made.   Patient instructed to cont prior HEP. Exercises were reviewed and he was able to demonstrate them prior to the end of the session. he demonstrated good  understanding of the HEP provided.   See EMR under Patient Instructions for exercises provided prior visit.      Assessment   Patient noting increase in pain levels post attempting to use for tight sustained gripping activity. He is also noting an increase in frequency of hand going numb at night (indicating that his wrist and fingers are straight. Patient with increase in pain with soft tissue mobilization. He had increase in difficulty with today's activities.  Patient will continue to benefit from skilled OT to further address pain and deficits in ROM and strength which are hindering ability to perform self care and IADLs. Patient's cultural,spiritual, and educational needs were considered.  Goals:  Short Term Goals  Patient with increase in ROM and decrease in pain allowing for increase use of right for to feed self with greater ease in 2 weeks-met  Patient with decrease in pain with palpation in 3 weeks-ongoing  Patient able to use right with greater ease to bathe self in 3 weeks-met  Patient with increase in ROM and decrease in pain allowing for increase in use for folding of clothes in 4 weeks-met  Long Term Goals  Patient with decrease in pain and increase in motion and strength allowing for greater ease with meal preparation in 5 weeks-ongoing  Patient able to use right to help operate lawnmower in 5 weeks-ongoing  Patient able to perform sweeping and mopping with increase use of right for greater ease in performance in 6 weeks-ongoing  Improve FOTO score by 15 points indicating improved function of right hand in 8 weeks-ongoing  Plan   Continue skilled therapy 2 times a week for 1 week  including therapeutic exercises and activities, manual therapy, and pain modalities,  adjust orthotic as needed     Will reassess next session and discuss plan of care     NELY Hines

## 2022-12-08 ENCOUNTER — CLINICAL SUPPORT (OUTPATIENT)
Dept: REHABILITATION | Facility: HOSPITAL | Age: 67
End: 2022-12-08
Payer: MEDICARE

## 2022-12-08 DIAGNOSIS — Z98.890 HX OF HAND SURGERY: Primary | ICD-10-CM

## 2022-12-08 PROCEDURE — 97110 THERAPEUTIC EXERCISES: CPT | Mod: KX,PN

## 2022-12-08 PROCEDURE — 97022 WHIRLPOOL THERAPY: CPT | Mod: KX,PN

## 2022-12-08 PROCEDURE — 97140 MANUAL THERAPY 1/> REGIONS: CPT | Mod: KX,PN

## 2022-12-08 NOTE — PLAN OF CARE
[facility] Occupational Therapy Daily Treatment/Updated Plan of Care Note   Date: 12/08/2022  Name: Aleksandar Knight .  Clinic Number: [id number]  Therapy Diagnosis: Right hand pain and stiffness  Physician: Rafi Andre MD  Physician Orders: OT for RT hand, 2 x week for 4 weeks   Medical Diagnosis: Z98.890 (ICD-10-CM) - Hx of hand surgery M72.0 (ICD-10-CM) - Dupuytren's contracture of right hand   Surgical Procedure and Date: 7/27/2022; Right small finger Dupuytren's release open  Evaluation Date: 8/29/2022  Insurance Authorization Period Expiration: 1/03/23  Plan of Care Certification Period: 1/31/23 (updated 12/08)  Date of Return to MD: 12/13/22  Visit # / Visits authorized: Brandin 1 / 1 23/ total of 35  Time In:6:50  Time Out: 7:45  Total Billable Time: 55 minutes   Precautions:  Standard     post op     Subjective   Pt reports:Numbness at night never changes.   Response to previous treatment: better   Functional change: as above  Pain: ranges from  3.5-4/10     Location: right hand   FOTO score: Eval 8/29  36%  Goal 68%;   9/15  42 %     Objective    A/PROM                                       RIGHT                                         10/04/22                     INDEX    LONG      RING     LITTLE                 MCP Ext-Flexion       0-60        0-60        10-60      10-65  PIP Ext-Flexion         0-90        0-95        15-80      20-85  DIP Ext-Flexion         0-50        0-70         0-60        0-65  12/08/22                     INDEX    LONG      RING     LITTLE                 MCP Ext-Flexion       0-60        0-60          5-60        7-55  PIP Ext-Flexion         0-90        0-95        10-90      20-80  DIP Ext-Flexion         0-50        0-70         0-60        0-65    *post heat, massage and stretches able to achieve 10 more degrees in flexion at little MP and Proximal interphalangeal joint   Hand -  Strength  Jaymar dynamometer 2nd Rung in lbs;  Pinches:pinch gauge  average of 2 trials in psi    12/08/22 11/01/22 9/29/22 9/29/22   Position Right Right Right Left   Hand : Trial 1 46 45 40 65   Hand : Trial 2 45 45 32 65   Hand : Trial 3 44 40 36 55    Average 45 43 36 61     Patient received the following treatment:   Supervised modalities after being cleared for contradictions for 10 minutes including:      Fluidotherapy to right hand for 10 minutes to increase tissue elasticity, desensitization, sensory re-education and for pain management. Air flow 80  heat 112 degrees   Manual therapy techniques to increase joint mobilization and soft tissue mobilization for 8 minutes including:    -Scar massage to ulnar volar aspect of hand for 8 minutes  to decrease dense adhesions and improve tensile glide. And stretching and holding into finger extension     Therapeutic exercises to improve functional performance while increasing strength, endurance, ROM, and flexibility for 37 minutes, including:  -Active assistive/Passive range of motion of each joint (all fingers) into flexion and extension and composite of each  -therapist holding of MPs in flexion as patient actively fisted x 5 repetitions   -fingers extension off table top isolated and composite 5 repetitions each  -little finger abduction/adduction with YELLOW rubberband resistance x 15 repetition  -Proximal interphalangeal joint flexion with yellow rubberband x 20 repetitions   -place and hold into fist x 5 repetitions   -Rubberband gripper with 2 large red bands 7 repetitions, 8 repetitions, 5 repetitions   Instruction and demonstration given of all introduced   Patient required moderate cuing for correct performance of exercise.     Home Exercises and Education Provided   Education provided:  - discussed insurance limitation  - Progress towards goals      Written Home Exercises Provided: 11/15 given GREEN theraputty 10/20 Encouraged sitting with left hand over right to provide stretch into extension while  sedentary. 9/07/22 Added: in hand manipulation of objects ~ 2 minutes and flicking of ring and little fingers as above 10 repetitions each 2 times a day. Patient was fitted with a piece of silicone gel sheeting for scar management to be worn under the orthotic. He took a picture of the instructions and precautions and therapist reviewed them with him. Reviewed orthotic guidelines: care (clean with soap and water and keep away from heat source), wear (for 1 hour 3 times during the day and wear while sleeping), precautions (increase in swelling, pain and pressure areas). Patient to call therapist if any problems arise with orthotic for proper adjustments to be made.   Patient instructed to cont prior HEP. Exercises were reviewed and he was able to demonstrate them prior to the end of the session. he demonstrated good  understanding of the HEP provided.   See EMR under Patient Instructions for exercises provided prior visit.      Assessment   Patient continues with fluctuating pain depending on how tightly he tries to . He continues with constant numbness at little finger tip and along lateral border and occasionally at ring finger. He continues to experience a shocking pain at little finger with palpation at proximal phalanx. His range of motion improves with heat massage and stretches. His  strength increased 2#, however, still with great deficit as compared to the left. His daily activities continue to be hindered due to inability to grasp with right hand. Patient will continue to benefit from skilled OT to further address pain and deficits in ROM and strength which are hindering ability to perform self care and IADLs. Patient's cultural,spiritual, and educational needs were considered.  Goals:  Short Term Goals  Patient with increase in ROM and decrease in pain allowing for increase use of right for to feed self with greater ease in 2 weeks-met  Patient with decrease in pain with palpation in 3  weeks-ongoing  Patient able to use right with greater ease to bathe self in 3 weeks-met  Patient with increase in ROM and decrease in pain allowing for increase in use for folding of clothes in 4 weeks-met  Long Term Goals-increase by 6 weeks  Patient with decrease in pain and increase in motion and strength allowing for greater ease with meal preparation in 5 weeks-ongoing  Patient able to use right to help operate lawnmower in 5 weeks-ongoing  Patient able to perform sweeping and mopping with increase use of right for greater ease in performance in 6 weeks-ongoing  Improve FOTO score by 15 points indicating improved function of right hand in 8 weeks-ongoing  Plan   Continue skilled therapy 2 times a week for 6 weeks  including therapeutic exercises and activities, manual therapy, and pain modalities, adjust orthotic as needed       NELY Hines

## 2022-12-13 ENCOUNTER — OFFICE VISIT (OUTPATIENT)
Dept: ORTHOPEDICS | Facility: CLINIC | Age: 67
End: 2022-12-13
Payer: MEDICARE

## 2022-12-13 VITALS
WEIGHT: 200 LBS | DIASTOLIC BLOOD PRESSURE: 84 MMHG | SYSTOLIC BLOOD PRESSURE: 140 MMHG | BODY MASS INDEX: 32.14 KG/M2 | HEIGHT: 66 IN

## 2022-12-13 DIAGNOSIS — Z98.890 HX OF HAND SURGERY: Primary | ICD-10-CM

## 2022-12-13 PROCEDURE — 99213 OFFICE O/P EST LOW 20 MIN: CPT | Mod: S$GLB,,, | Performed by: ORTHOPAEDIC SURGERY

## 2022-12-13 PROCEDURE — 3066F NEPHROPATHY DOC TX: CPT | Mod: CPTII,S$GLB,, | Performed by: ORTHOPAEDIC SURGERY

## 2022-12-13 PROCEDURE — 1160F RVW MEDS BY RX/DR IN RCRD: CPT | Mod: CPTII,S$GLB,, | Performed by: ORTHOPAEDIC SURGERY

## 2022-12-13 PROCEDURE — 4010F ACE/ARB THERAPY RXD/TAKEN: CPT | Mod: CPTII,S$GLB,, | Performed by: ORTHOPAEDIC SURGERY

## 2022-12-13 PROCEDURE — 1101F PT FALLS ASSESS-DOCD LE1/YR: CPT | Mod: CPTII,S$GLB,, | Performed by: ORTHOPAEDIC SURGERY

## 2022-12-13 PROCEDURE — 3077F PR MOST RECENT SYSTOLIC BLOOD PRESSURE >= 140 MM HG: ICD-10-PCS | Mod: CPTII,S$GLB,, | Performed by: ORTHOPAEDIC SURGERY

## 2022-12-13 PROCEDURE — 3079F DIAST BP 80-89 MM HG: CPT | Mod: CPTII,S$GLB,, | Performed by: ORTHOPAEDIC SURGERY

## 2022-12-13 PROCEDURE — 3008F PR BODY MASS INDEX (BMI) DOCUMENTED: ICD-10-PCS | Mod: CPTII,S$GLB,, | Performed by: ORTHOPAEDIC SURGERY

## 2022-12-13 PROCEDURE — 1125F PR PAIN SEVERITY QUANTIFIED, PAIN PRESENT: ICD-10-PCS | Mod: CPTII,S$GLB,, | Performed by: ORTHOPAEDIC SURGERY

## 2022-12-13 PROCEDURE — 1125F AMNT PAIN NOTED PAIN PRSNT: CPT | Mod: CPTII,S$GLB,, | Performed by: ORTHOPAEDIC SURGERY

## 2022-12-13 PROCEDURE — 3008F BODY MASS INDEX DOCD: CPT | Mod: CPTII,S$GLB,, | Performed by: ORTHOPAEDIC SURGERY

## 2022-12-13 PROCEDURE — 3061F PR NEG MICROALBUMINURIA RESULT DOCUMENTED/REVIEW: ICD-10-PCS | Mod: CPTII,S$GLB,, | Performed by: ORTHOPAEDIC SURGERY

## 2022-12-13 PROCEDURE — 3288F PR FALLS RISK ASSESSMENT DOCUMENTED: ICD-10-PCS | Mod: CPTII,S$GLB,, | Performed by: ORTHOPAEDIC SURGERY

## 2022-12-13 PROCEDURE — 3061F NEG MICROALBUMINURIA REV: CPT | Mod: CPTII,S$GLB,, | Performed by: ORTHOPAEDIC SURGERY

## 2022-12-13 PROCEDURE — 1101F PR PT FALLS ASSESS DOC 0-1 FALLS W/OUT INJ PAST YR: ICD-10-PCS | Mod: CPTII,S$GLB,, | Performed by: ORTHOPAEDIC SURGERY

## 2022-12-13 PROCEDURE — 3077F SYST BP >= 140 MM HG: CPT | Mod: CPTII,S$GLB,, | Performed by: ORTHOPAEDIC SURGERY

## 2022-12-13 PROCEDURE — 1159F PR MEDICATION LIST DOCUMENTED IN MEDICAL RECORD: ICD-10-PCS | Mod: CPTII,S$GLB,, | Performed by: ORTHOPAEDIC SURGERY

## 2022-12-13 PROCEDURE — 3079F PR MOST RECENT DIASTOLIC BLOOD PRESSURE 80-89 MM HG: ICD-10-PCS | Mod: CPTII,S$GLB,, | Performed by: ORTHOPAEDIC SURGERY

## 2022-12-13 PROCEDURE — 4010F PR ACE/ARB THEARPY RXD/TAKEN: ICD-10-PCS | Mod: CPTII,S$GLB,, | Performed by: ORTHOPAEDIC SURGERY

## 2022-12-13 PROCEDURE — 1159F MED LIST DOCD IN RCRD: CPT | Mod: CPTII,S$GLB,, | Performed by: ORTHOPAEDIC SURGERY

## 2022-12-13 PROCEDURE — 3066F PR DOCUMENTATION OF TREATMENT FOR NEPHROPATHY: ICD-10-PCS | Mod: CPTII,S$GLB,, | Performed by: ORTHOPAEDIC SURGERY

## 2022-12-13 PROCEDURE — 3288F FALL RISK ASSESSMENT DOCD: CPT | Mod: CPTII,S$GLB,, | Performed by: ORTHOPAEDIC SURGERY

## 2022-12-13 PROCEDURE — 99213 PR OFFICE/OUTPT VISIT, EST, LEVL III, 20-29 MIN: ICD-10-PCS | Mod: S$GLB,,, | Performed by: ORTHOPAEDIC SURGERY

## 2022-12-13 PROCEDURE — 1160F PR REVIEW ALL MEDS BY PRESCRIBER/CLIN PHARMACIST DOCUMENTED: ICD-10-PCS | Mod: CPTII,S$GLB,, | Performed by: ORTHOPAEDIC SURGERY

## 2022-12-13 NOTE — PROGRESS NOTES
[facility]    Subjective:     Chief Complaint:   Chief Complaint   Patient presents with    Right Hand - Post-op Evaluation     Right Dupuytrens release 7/27/22, overall doing well. PT is helping. Still has shooting pain at night.       Past Medical History:   Diagnosis Date    Dupuytren contracture 2022    Hyperlipidemia     Hypertension        Past Surgical History:   Procedure Laterality Date    COLONOSCOPY N/A 11/20/2019    Procedure: COLONOSCOPY;  Surgeon: Nabeel Hart MD;  Location: [facility] ENDO;  Service: Endoscopy;  Laterality: N/A;    DUPUYTREN CONTRACTURE RELEASE Right 7/27/2022    Procedure: RELEASE, DUPUYTREN CONTRACTURE;  Surgeon: Rafi Andre MD;  Location: [facility] OR;  Service: Orthopedics;  Laterality: Right;       Current Outpatient Medications   Medication Sig    amlodipine-benazepril 10-20mg (LOTREL) 10-20 mg per capsule Take 1 capsule by mouth once daily.    atorvastatin (LIPITOR) 40 MG tablet Take 1 tablet (40 mg total) by mouth once daily. For cholesterol    tamsulosin (FLOMAX) 0.4 mg Cap Take 1 capsule (0.4 mg total) by mouth once daily.    methylPREDNISolone (MEDROL DOSEPACK) 4 mg tablet use as directed (Patient not taking: Reported on 12/13/2022)     No current facility-administered medications for this visit.       Review of patient's allergies indicates:  No Known Allergies    Family History   Problem Relation Age of Onset    Cancer Father         unkn type       Social History     Socioeconomic History    Marital status: Single   Tobacco Use    Smoking status: Never    Smokeless tobacco: Never   Substance and Sexual Activity    Alcohol use: Never    Drug use: Never       History of present illness:  67-year-old male returns to clinic today for follow-up Dupuytren's contracture release right 5th finger.  This was done back in July.  He has been doing physical therapy, he just had a lot of scarring afterwards.  This caused some stiffness and decreased range of motion.  He is  very happy and pleased with the results and his physical therapy and progression.  He does complain of some occasional tingling or shocking type pains in the little finger and the ulnar aspect of the ring finger.      Review of Systems:    Constitution: Negative for chills, fever, and sweats.  Negative for unexplained weight loss.    HENT:  Negative for headaches and blurry vision.    Cardiovascular:Negative for chest pain or irregular heart beat. Negative for hypertension.    Respiratory:  Negative for cough and shortness of breath.    Gastrointestinal: Negative for abdominal pain, heartburn, melena, nausea, and vomitting.    Genitourinary:  Negative bladder incontinence and dysuria.    Musculoskeletal:  See HPI for details.     Neurological: Negative for numbness.    Psychiatric/Behavioral: Negative for depression.  The patient is not nervous/anxious.      Endocrine: Negative for polyuria    Hematologic/Lymphatic: Negative for bleeding problem.  Does not bruise/bleed easily.    Skin: Negative for poor would healing and rash    Objective:      Physical Examination:    Vital Signs:    Vitals:    12/13/22 0735   BP: (!) 140/84       Body mass index is 32.28 kg/m².    This a well-developed, well nourished patient in no acute distress.  They are alert and oriented and cooperative to examination.        Examination of the right hand, palmar aspect surgical incision looking better after physical therapy and modalities.  He is almost able to completely straighten the hand, he can flex to within 1 cm of the palm.  Phalen's test was negative, Tinel sign negative at the wrist and elbow.    Pertinent New Results:    XRAY Report / Interpretation:       Assessment/Plan:      Stable status post Dupuytren's contracture release right 5th finger.  Continue with therapy.  We will complete this round of therapy in that that point he can follow-up us p.r.n. left he still having some symptoms or concerns.    Dieter Zhang, Physician  "Assistant, served in the capacity as a "scribe" for this patient encounter.  A "face-to-face" encounter occurred with Dr. Rafi Andre on this date.  The treatment plan and medical decision-making is outlined above. Patient was seen and examined with a chaperone.       This note was created using Dragon voice recognition software that occasionally misinterpreted phrases or words.          "

## 2022-12-20 ENCOUNTER — CLINICAL SUPPORT (OUTPATIENT)
Dept: REHABILITATION | Facility: HOSPITAL | Age: 67
End: 2022-12-20
Payer: MEDICARE

## 2022-12-20 DIAGNOSIS — Z98.890 HX OF HAND SURGERY: Primary | ICD-10-CM

## 2022-12-20 PROCEDURE — 97110 THERAPEUTIC EXERCISES: CPT | Mod: PN

## 2022-12-20 PROCEDURE — 97140 MANUAL THERAPY 1/> REGIONS: CPT | Mod: PN

## 2022-12-20 PROCEDURE — 97022 WHIRLPOOL THERAPY: CPT | Mod: PN

## 2022-12-20 NOTE — PROGRESS NOTES
[facility] Occupational Therapy Daily Treatment Note   Date: 12/20/2022  Name: Aleksandar Knight .  Clinic Number: [id number]  Therapy Diagnosis: Right hand pain and stiffness  Physician: Rafi Andre MD  Physician Orders: OT for RT hand, 2 x week for 4 weeks   Medical Diagnosis: Z98.890 (ICD-10-CM) - Hx of hand surgery M72.0 (ICD-10-CM) - Dupuytren's contracture of right hand   Surgical Procedure and Date: 7/27/2022; Right small finger Dupuytren's release open  Evaluation Date: 8/29/2022  Insurance Authorization Period Expiration: 1/03/23  Plan of Care Certification Period: 1/31/23 (updated 12/08)  Date of Return to MD:   Visit # / Visits authorized: Brandin 1 / 1   24/ total of 35  Time In:6:50  Time Out: 7:45  Total Billable Time: 55 minutes   Precautions:  Standard          Subjective   Pt reports: Saw the doctor said to keep coming to therapy. Patient indicated he did a Phalen's test which was negative. Little finger and 1/2 of the ring finger stays numb. Ring finger getting worse and sometimes feel it into the middle finger. Night time is still the worse with the numbness. During session several times noted a sharp pain with massage and with passive extension of index and long Proximal interphalangeal joints.  Used my hand some over the weekend to help put in a wood stove. Hand was killing me for 2 days.  Response to previous treatment: unremarkable   Functional change: as above  Pain:  4/10  pain up to 10/10 with the sharp pain   Location: right hand   FOTO score: Brandin 8/29  36%  Goal 68%;   9/15  42 %     Objective   Patient received the following treatment:   Supervised modalities after being cleared for contradictions for 10 minutes including:      Fluidotherapy to right hand for 10 minutes to increase tissue elasticity, desensitization, sensory re-education and for pain management. Air flow 80  heat 112 degrees   Manual therapy techniques to increase joint mobilization and soft tissue  "mobilization for 8 minutes including:    -Scar massage to ulnar volar aspect of hand for 8 minutes  to decrease dense adhesions and improve tensile glide. And stretching and holding into finger extension   Therapeutic exercises to improve functional performance while increasing strength, endurance, ROM, and flexibility for 34 minutes, including:  -Active assistive/Passive range of motion of each joint (all fingers) into flexion and extension and composite of each  -therapist holding of MPs in flexion as patient actively fisted x 5 repetitions   -fingers extension off table top isolated and composite 5 repetitions each  -little finger abduction/adduction with YELLOW rubberband resistance x 15 repetition  -Proximal interphalangeal joint flexion with yellow rubberband x 20 repetitions   -place and hold into fist x 10 repetitions   Therapeutic activities to improve functional performance with use of dynamic activities for  3 minutes  including:  -GREEN theraputty depressing into putty 1/2" dowel -unable to hold onto it last visit     Instruction and demonstration given of all introduced   Patient required moderate cuing for correct performance of exercise.     Home Exercises and Education Provided   Education provided:  - discussed insurance limitation  - Progress towards goals      Written Home Exercises Provided: 11/15 given GREEN theraputty 10/20 Encouraged sitting with left hand over right to provide stretch into extension while sedentary. 9/07/22 Added: in hand manipulation of objects ~ 2 minutes and flicking of ring and little fingers as above 10 repetitions each 2 times a day. Patient was fitted with a piece of silicone gel sheeting for scar management to be worn under the orthotic. He took a picture of the instructions and precautions and therapist reviewed them with him. Reviewed orthotic guidelines: care (clean with soap and water and keep away from heat source), wear (for 1 hour 3 times during the day and " wear while sleeping), precautions (increase in swelling, pain and pressure areas). Patient to call therapist if any problems arise with orthotic for proper adjustments to be made.   Patient instructed to cont prior HEP. Exercises were reviewed and he was able to demonstrate them prior to the end of the session. he demonstrated good  understanding of the HEP provided.   See EMR under Patient Instructions for exercises provided prior visit.      Assessment   Patient jumped several times with a sharp pain at little finger with scar massage. Patient noting the tingling/numbness is getting worse-at times extending into the middle finger. He reported pain with passive extension of the index and long finger Proximal interphalangeal joint . He has difficulty with assuming table top to achieve full MP flexion of little finger. Pain noted with all passive motion. Patient will continue to benefit from skilled OT to further address pain and deficits in ROM and strength which are hindering ability to perform self care and IADLs. Patient's cultural,spiritual, and educational needs were considered.  Goals:  Long Term Goals-increase by 6 weeks  Patient with decrease in pain and increase in motion and strength allowing for greater ease with meal preparation in 5 weeks-ongoing  Patient able to use right to help operate lawnmower in 5 weeks-ongoing  Patient able to perform sweeping and mopping with increase use of right for greater ease in performance in 6 weeks-ongoing  Improve FOTO score by 15 points indicating improved function of right hand in 8 weeks-ongoing  Plan   Continue skilled therapy 2 times a week for 6 weeks  including therapeutic exercises and activities, manual therapy, and pain modalities, adjust orthotic as needed       NELY Hines

## 2022-12-22 ENCOUNTER — CLINICAL SUPPORT (OUTPATIENT)
Dept: REHABILITATION | Facility: HOSPITAL | Age: 67
End: 2022-12-22
Payer: MEDICARE

## 2022-12-22 DIAGNOSIS — Z98.890 HX OF HAND SURGERY: Primary | ICD-10-CM

## 2022-12-22 PROCEDURE — 97110 THERAPEUTIC EXERCISES: CPT | Mod: PN

## 2022-12-22 PROCEDURE — 97530 THERAPEUTIC ACTIVITIES: CPT | Mod: PN

## 2022-12-22 PROCEDURE — 97022 WHIRLPOOL THERAPY: CPT | Mod: PN

## 2022-12-22 PROCEDURE — 97140 MANUAL THERAPY 1/> REGIONS: CPT | Mod: PN

## 2022-12-22 NOTE — PROGRESS NOTES
[facility] Occupational Therapy Daily Treatment Note   Date: 12/22/2022  Name: Aleksandar Knight .  Clinic Number: [id number]  Therapy Diagnosis: Right hand pain and stiffness  Physician: Rafi Andre MD  Physician Orders: OT for RT hand, 2 x week for 4 weeks   Medical Diagnosis: Z98.890 (ICD-10-CM) - Hx of hand surgery M72.0 (ICD-10-CM) - Dupuytren's contracture of right hand   Surgical Procedure and Date: 7/27/2022; Right small finger Dupuytren's release open  Evaluation Date: 8/29/2022  Insurance Authorization Period Expiration: 1/03/23  Plan of Care Certification Period: 1/31/23 (updated 12/08)  Date of Return to MD:   Visit # / Visits authorized: Brandin 1 / 1   25/ total of 35  Time In:6:50  Time Out: 7:50  Total Billable Time: 60 minutes   Precautions:  Standard          Subjective   Pt reports: Numbness in all my fingers. Wakes me up at night. As performing gripping and fisting exercise and passive little extension noted increase in numbness into all fingers.   Response to previous treatment: hand sore until Wed.   Functional change: as above  Pain:  4/10  pain up to 10/10 with the sharp pain   Location: right hand   FOTO score: Brandin 8/29  36%  Goal 68%;   9/15  42 %     Objective   Patient received the following treatment:   Supervised modalities after being cleared for contradictions for 10 minutes including:      Fluidotherapy to right hand for 10 minutes to increase tissue elasticity, desensitization, sensory re-education and for pain management. Air flow 80  heat 112 degrees   Manual therapy techniques to increase joint mobilization and soft tissue mobilization for 10 minutes including:    -Scar massage to ulnar volar aspect of hand for 8 minutes  to decrease dense adhesions and improve tensile glide. And stretching and holding into finger extension. Adhesion noted at Proximal interphalangeal joint crease   Therapeutic exercises to improve functional performance while increasing strength,  "endurance, ROM, and flexibility for 32 minutes, including:  -Active assistive/Passive range of motion of each joint (all fingers) into flexion and extension and composite of each  -joint blocking flexion of little Proximal interphalangeal joint and Distal interphalangeal joint 15 repetitions each  -therapist holding of MPs in flexion as patient actively fisted x 5 repetitions   -place and hold into fist x 10 repetitions   -Rubberband gripper with 2 large red bands 10 repetitions x 2 repetitions   Therapeutic activities to improve functional performance with use of dynamic activities for  8 minutes  including:  -sponge squeezes at 4th web 10 repetitions x 2   -GREEN theraputty depressing into putty 1/2" dowel   -grasping of GREEN putty with ulnar fingers and stretching out x 10 repetitions    -GREEN rolling out stretching into extension x 1 minute  Instruction and demonstration given of all introduced   Patient required moderate cuing for correct performance of exercise.     Home Exercises and Education Provided   Education provided:  - discussed insurance limitation  - Progress towards goals      Written Home Exercises Provided: 11/15 given GREEN theraputty 10/20 Encouraged sitting with left hand over right to provide stretch into extension while sedentary. 9/07/22 Added: in hand manipulation of objects ~ 2 minutes and flicking of ring and little fingers as above 10 repetitions each 2 times a day. Patient was fitted with a piece of silicone gel sheeting for scar management to be worn under the orthotic. He took a picture of the instructions and precautions and therapist reviewed them with him. Reviewed orthotic guidelines: care (clean with soap and water and keep away from heat source), wear (for 1 hour 3 times during the day and wear while sleeping), precautions (increase in swelling, pain and pressure areas). Patient to call therapist if any problems arise with orthotic for proper adjustments to be made.   Patient " instructed to cont prior HEP. Exercises were reviewed and he was able to demonstrate them prior to the end of the session. he demonstrated good  understanding of the HEP provided.   See EMR under Patient Instructions for exercises provided prior visit.      Assessment   Patient continues with numbness at fingers awakening him at night. Patient noting numbness throughout fingers with fisting and gripping exercise, numbness and tingling comes on faster if uses little to .  Increase in numbness also noted with therapist passively extending little finger. Patient unable to achieve or maintain fingers to palm. Patient will continue to benefit from skilled OT to further address pain and deficits in ROM and strength which are hindering ability to perform self care and IADLs. Patient's cultural,spiritual, and educational needs were considered.  Goals:  Long Term Goals-increase by 6 weeks  Patient with decrease in pain and increase in motion and strength allowing for greater ease with meal preparation in 5 weeks-ongoing  Patient able to use right to help operate lawnmower in 5 weeks-ongoing  Patient able to perform sweeping and mopping with increase use of right for greater ease in performance in 6 weeks-ongoing  Improve FOTO score by 15 points indicating improved function of right hand in 8 weeks-ongoing  Plan   Continue skilled therapy 2 times a week for 5 weeks  including therapeutic exercises and activities, manual therapy, and pain modalities, adjust orthotic as needed       NELY Hines

## 2022-12-27 ENCOUNTER — CLINICAL SUPPORT (OUTPATIENT)
Dept: REHABILITATION | Facility: HOSPITAL | Age: 67
End: 2022-12-27
Payer: MEDICARE

## 2022-12-27 DIAGNOSIS — Z98.890 HX OF HAND SURGERY: Primary | ICD-10-CM

## 2022-12-27 PROCEDURE — 97022 WHIRLPOOL THERAPY: CPT | Mod: PN

## 2022-12-27 PROCEDURE — 97140 MANUAL THERAPY 1/> REGIONS: CPT | Mod: PN

## 2022-12-27 PROCEDURE — 97530 THERAPEUTIC ACTIVITIES: CPT | Mod: PN

## 2022-12-27 PROCEDURE — 97110 THERAPEUTIC EXERCISES: CPT | Mod: PN

## 2022-12-27 NOTE — PROGRESS NOTES
[facility] Occupational Therapy Daily Treatment Note   Date: 12/27/2022  Name: Aleksandar Knight .  Clinic Number: [id number]  Therapy Diagnosis: Right hand pain and stiffness  Physician: Rafi Andre MD  Physician Orders: OT for RT hand, 2 x week for 4 weeks   Medical Diagnosis: Z98.890 (ICD-10-CM) - Hx of hand surgery M72.0 (ICD-10-CM) - Dupuytren's contracture of right hand   Surgical Procedure and Date: 7/27/2022; Right small finger Dupuytren's release open  Evaluation Date: 8/29/2022  Insurance Authorization Period Expiration: 1/03/23  Plan of Care Certification Period: 1/31/23 (updated 12/08)  Date of Return to MD:   Visit # / Visits authorized: Brandin 1 / 1   26/ total of 35  Time In:6:50  Time Out: 7:45  Total Billable Time: 55 minutes   Precautions:  Standard          Subjective   Pt reports: Tried to  and carry a boxed microwave. Carried it about 10-12 ft and dropped it. Did it twice. My hand was killing me that night. Had a lot of sharp pains and numbness. Once my hand cramped up so bad couldn't straighten them had to get up and put it in hot warm and slowly work it.   Response to previous treatment:   Functional change: as above  Pain:  4/10  pain up to 10/10 with the sharp pain   Location: right hand   FOTO score: Brandin 8/29  36%  Goal 68%;   9/15  42 %     Objective   Patient received the following treatment:   Supervised modalities after being cleared for contradictions for 10 minutes including:      Fluidotherapy to right hand for 10 minutes to increase tissue elasticity, desensitization, sensory re-education and for pain management. Air flow 80  heat 112 degrees   Manual therapy techniques to increase joint mobilization and soft tissue mobilization for 10 minutes including:    -Scar massage to ulnar volar aspect of hand for 8 minutes  to decrease dense adhesions and improve tensile glide. And stretching and holding into finger extension. Adhesion noted at Proximal  "interphalangeal joint crease   Therapeutic exercises to improve functional performance while increasing strength, endurance, ROM, and flexibility for 31 minutes, including:  -Active assistive/Passive range of motion of each joint (all fingers) into flexion and extension and composite of each  -joint blocking flexion of little Proximal interphalangeal joint and Distal interphalangeal joint 15 repetitions each  -therapist holding of MPs in flexion as patient actively fisted x 5 repetitions   -place and hold into fist x 10 repetitions   -   Therapeutic activities to improve functional performance with use of dynamic activities for  8 minutes  including:  -GREEN theraputty depressing into putty 1" cardboard dowel   -little to palm to hold 3/4" and 1/2" beads as un/laced 12 beads   -GREEN rolling out stretching into extension x 1 minute  Instruction and demonstration given of all introduced   Patient required moderate cuing for correct performance of exercise.     Home Exercises and Education Provided   Education provided:  - discussed insurance limitation  - Progress towards goals      Written Home Exercises Provided: 11/15 given GREEN theraputty 10/20 Encouraged sitting with left hand over right to provide stretch into extension while sedentary. 9/07/22 Added: in hand manipulation of objects ~ 2 minutes and flicking of ring and little fingers as above 10 repetitions each 2 times a day. Patient was fitted with a piece of silicone gel sheeting for scar management to be worn under the orthotic. He took a picture of the instructions and precautions and therapist reviewed them with him. Reviewed orthotic guidelines: care (clean with soap and water and keep away from heat source), wear (for 1 hour 3 times during the day and wear while sleeping), precautions (increase in swelling, pain and pressure areas). Patient to call therapist if any problems arise with orthotic for proper adjustments to be made.   Patient instructed to " cont prior HEP. Exercises were reviewed and he was able to demonstrate them prior to the end of the session. he demonstrated good  understanding of the HEP provided.   See EMR under Patient Instructions for exercises provided prior visit.      Assessment   Patient with increase in frequency of numbness at fingers; constant at little and on and off at others. During today's session other fingers became numb with grasping. He was not able to perform activities as in previus sessions due to increase in pain and numbness.  Patient will continue to benefit from skilled OT to further address pain and deficits in ROM and strength which are hindering ability to perform self care and IADLs. Patient's cultural,spiritual, and educational needs were considered.  Goals:  Long Term Goals-increase by 6 weeks  Patient with decrease in pain and increase in motion and strength allowing for greater ease with meal preparation in 5 weeks-ongoing  Patient able to use right to help operate lawnmower in 5 weeks-ongoing  Patient able to perform sweeping and mopping with increase use of right for greater ease in performance in 6 weeks-ongoing  Improve FOTO score by 15 points indicating improved function of right hand in 8 weeks-ongoing  Plan   Continue skilled therapy 2 times a week for 5 weeks  including therapeutic exercises and activities, manual therapy, and pain modalities, adjust orthotic as needed       NELY Hines

## 2022-12-29 ENCOUNTER — CLINICAL SUPPORT (OUTPATIENT)
Dept: REHABILITATION | Facility: HOSPITAL | Age: 67
End: 2022-12-29
Payer: MEDICARE

## 2022-12-29 DIAGNOSIS — Z98.890 HX OF HAND SURGERY: Primary | ICD-10-CM

## 2022-12-29 PROCEDURE — 97022 WHIRLPOOL THERAPY: CPT | Mod: PN

## 2022-12-29 PROCEDURE — 97140 MANUAL THERAPY 1/> REGIONS: CPT | Mod: PN

## 2022-12-29 PROCEDURE — 97110 THERAPEUTIC EXERCISES: CPT | Mod: PN

## 2022-12-29 PROCEDURE — 97530 THERAPEUTIC ACTIVITIES: CPT | Mod: PN

## 2022-12-29 NOTE — PROGRESS NOTES
[facility] Occupational Therapy Daily Treatment Note   Date: 12/29/2022  Name: Aleksandar Knight .  Clinic Number: [id number]  Therapy Diagnosis: Right hand pain and stiffness  Physician: Rafi Andre MD  Physician Orders: OT for RT hand, 2 x week for 4 weeks   Medical Diagnosis: Z98.890 (ICD-10-CM) - Hx of hand surgery M72.0 (ICD-10-CM) - Dupuytren's contracture of right hand   Surgical Procedure and Date: 7/27/2022; Right small finger Dupuytren's release open  Evaluation Date: 8/29/2022  Insurance Authorization Period Expiration: 1/03/23  Plan of Care Certification Period: 1/31/23 (updated 12/08)  Date of Return to MD:   Visit # / Visits authorized: Brandin 1 / 1 27/ total of 35  6 left as of 12/29 per referral  Time In:6:52  Time Out: 7:45  Total Billable Time: 53 minutes   Precautions:  Standard          Subjective   Pt reports: Pain wasn't as bad last night, not the shocking pain but still waking me up with numbness. Therapy sessions help or I would have quit coming.  Response to previous treatment: better after a while  Functional change: as above  Pain:  3/10   Location: right hand   FOTO score: Eval 8/29  36%  Goal 68%;   9/15  42 %     Objective   Patient received the following treatment:   Supervised modalities after being cleared for contradictions for 10 minutes including:      Fluidotherapy to right hand for 10 minutes to increase tissue elasticity, desensitization, sensory re-education and for pain management. Air flow 80  heat 112 degrees   Manual therapy techniques to increase joint mobilization and soft tissue mobilization for 10 minutes including:    -Scar massage to ulnar volar aspect of hand for 8 minutes  to decrease dense adhesions and improve tensile glide. And stretching and holding into finger extension. Adhesion noted at Proximal interphalangeal joint crease   Therapeutic exercises to improve functional performance while increasing strength, endurance, ROM, and flexibility  "for 17 minutes, including:  -Active assistive/Passive range of motion of each joint (all fingers) into flexion and extension and composite of each  -joint blocking flexion of little Proximal interphalangeal joint and Distal interphalangeal joint 15 repetitions each  -thin yellow rubberband little Proximal interphalangeal joint flexion x 20 repetitions   -therapist holding of MPs in flexion as patient actively fisted x 5 repetitions   -place and hold into fist x 10 repetitions   - Gripper 35# 20 repetitions  - after a few repetitions sharp pain so completed without use of little finger -unable to completely close gripper  Therapeutic activities to improve functional performance with use of dynamic activities for 18 minutes  including:  -grasping of GREEN putty with ulnar fingers and stretching out x 10 repetitions   -sponge squeezes at 4th web 10 repetitions x 2   -holding screw  with dagger grasp placed 5 screws into/out of screw board  -little to palm to hold 3/4" and 1/2" beads as un/laced 12 beads  Instruction and demonstration given of all introduced   Patient required moderate cuing for correct performance of exercise.     Home Exercises and Education Provided   Education provided:  - discussed insurance limitation  - Progress towards goals      Written Home Exercises Provided: 11/15 given GREEN theraputty 10/20 Encouraged sitting with left hand over right to provide stretch into extension while sedentary. 9/07/22 Added: in hand manipulation of objects ~ 2 minutes and flicking of ring and little fingers as above 10 repetitions each 2 times a day. Patient was fitted with a piece of silicone gel sheeting for scar management to be worn under the orthotic. He took a picture of the instructions and precautions and therapist reviewed them with him. Reviewed orthotic guidelines: care (clean with soap and water and keep away from heat source), wear (for 1 hour 3 times during the day and wear while sleeping), " precautions (increase in swelling, pain and pressure areas). Patient to call therapist if any problems arise with orthotic for proper adjustments to be made.   Patient instructed to cont prior HEP. Exercises were reviewed and he was able to demonstrate them prior to the end of the session. he demonstrated good  understanding of the HEP provided.   See EMR under Patient Instructions for exercises provided prior visit.      Assessment   Patient noting night pain a little less than noted on previous session. He was able to perform rubberband exercise a little better-achieving greater range in Proximal interphalangeal joint flexion. He continues to not to be able to depress gripper as ~ 2 weeks ago due to increase in sharp pain in hand when using little finger in gripping.  Patient will continue to benefit from skilled OT to further address pain and deficits in ROM and strength which are hindering ability to perform self care and IADLs. Patient's cultural,spiritual, and educational needs were considered.  Goals:  Long Term Goals-increase by 6 weeks  Patient with decrease in pain and increase in motion and strength allowing for greater ease with meal preparation in 5 weeks-ongoing  Patient able to use right to help operate lawnmower in 5 weeks-ongoing  Patient able to perform sweeping and mopping with increase use of right for greater ease in performance in 6 weeks-ongoing  Improve FOTO score by 15 points indicating improved function of right hand in 8 weeks-ongoing  Plan   Continue skilled therapy 2 times a week for 4 weeks  including therapeutic exercises and activities, manual therapy, and pain modalities, adjust orthotic as needed       NELY Hines

## 2023-01-03 ENCOUNTER — CLINICAL SUPPORT (OUTPATIENT)
Dept: REHABILITATION | Facility: HOSPITAL | Age: 68
End: 2023-01-03
Payer: MEDICARE

## 2023-01-03 DIAGNOSIS — Z98.890 HX OF HAND SURGERY: Primary | ICD-10-CM

## 2023-01-03 PROCEDURE — 97140 MANUAL THERAPY 1/> REGIONS: CPT | Mod: PN

## 2023-01-03 PROCEDURE — 97110 THERAPEUTIC EXERCISES: CPT | Mod: PN

## 2023-01-03 PROCEDURE — 97022 WHIRLPOOL THERAPY: CPT | Mod: PN

## 2023-01-03 NOTE — PROGRESS NOTES
[facility] Occupational Therapy Daily Treatment Note   Date: 01/03/23  Name: Aleksandar Knight Sr.  Clinic Number: [id number]  Therapy Diagnosis: Right hand pain and stiffness  Physician: Rafi Andre MD  Physician Orders: OT for RT hand, 2 x week for 4 weeks   Medical Diagnosis: Z98.890 (ICD-10-CM) - Hx of hand surgery M72.0 (ICD-10-CM) - Dupuytren's contracture of right hand   Surgical Procedure and Date: 7/27/2022; Right small finger Dupuytren's release open  Evaluation Date: 8/29/2022  Insurance Authorization Period Expiration: 1/03/23  Plan of Care Certification Period: 1/31/23 (updated 12/08)  Date of Return to MD:   Visit # / Visits authorized: Brandin 1 / 1   28/ total of 35    5 left as of 12/29 per referral  Time In:7:30  Time Out: 8:15  Total Billable Time: 45 minutes   Precautions:  Standard          Subjective   Pt reports: Shocking pain more often. The little and ring fingers are numb 50% of the time then the long finger another 25% then the index another 25% .  Pain not as bad as it was around [place] time but not back to as good prior to that.   Response to previous treatment: better after a while  Functional change: none  Pain:  3/10   5/10 by end of session  Location: right hand   FOTO score: Catinaal 8/29  36%  Goal 68%;   9/15  42%     Objective   Patient received the following treatment:   Supervised modalities after being cleared for contradictions for 10 minutes including:      Fluidotherapy to right hand for 10 minutes to increase tissue elasticity, desensitization, sensory re-education and for pain management. Air flow 80  heat 112 degrees   Manual therapy techniques to increase joint mobilization and soft tissue mobilization for 10 minutes including:    -Scar massage to ulnar volar aspect of hand for 8 minutes  to decrease dense adhesions and improve tensile glide. And stretching and holding into finger extension. Adhesion noted at Proximal interphalangeal joint crease    "Therapeutic exercises to improve functional performance while increasing strength, endurance, ROM, and flexibility for 22 minutes, including:  -Active assistive/Passive range of motion of each joint (all fingers) into flexion and extension and composite of each  -joint blocking flexion of little Proximal interphalangeal joint and Distal interphalangeal joint 15 repetitions each  -fingers extension off table top isolated and composite 5 repetitions each  -little finger adduction x 10 repetitions   -thin yellow rubberband little Proximal interphalangeal joint flexion x 20 repetitions-sharp achy pain into long finger    -therapist holding of MPs in flexion as patient actively fisted x 5 repetitions   -place and hold into fist x 10 repetitions   - Gripper 35# 20 repetitions  -little finger going numb and cramping with use of finger for gripping  Therapeutic activities to improve functional performance with use of dynamic activities for 3 minutes  including:  -holding of 1/2" dowel with gross grasp as depressing in to RED putty   -Instruction and demonstration given of all introduced   Patient required moderate cuing for correct performance of exercise.     Home Exercises and Education Provided   Education provided:  - discussed insurance limitation  - Progress towards goals      Written Home Exercises Provided: 11/15 given GREEN theraputty 10/20 Encouraged sitting with left hand over right to provide stretch into extension while sedentary. 9/07/22 Added: in hand manipulation of objects ~ 2 minutes and flicking of ring and little fingers as above 10 repetitions each 2 times a day. Patient was fitted with a piece of silicone gel sheeting for scar management to be worn under the orthotic. He took a picture of the instructions and precautions and therapist reviewed them with him. Reviewed orthotic guidelines: care (clean with soap and water and keep away from heat source), wear (for 1 hour 3 times during the day and wear " while sleeping), precautions (increase in swelling, pain and pressure areas). Patient to call therapist if any problems arise with orthotic for proper adjustments to be made.   Patient instructed to cont prior HEP. Exercises were reviewed and he was able to demonstrate them prior to the end of the session. he demonstrated good  understanding of the HEP provided.   See EMR under Patient Instructions for exercises provided prior visit.      Assessment   Patient continues with frequent shocking pain and numbness throughout hand (not thumb). He noted hurts more to open fingers than to close them. He had more difficulty with preforming grasping activities today with increase in numbness and pain.  Patient will continue to benefit from skilled OT to further address pain and deficits in ROM and strength which are hindering ability to perform self care and IADLs. Patient's cultural,spiritual, and educational needs were considered.  Goals:  Long Term Goals-increase by 6 weeks  Patient with decrease in pain and increase in motion and strength allowing for greater ease with meal preparation in 5 weeks-ongoing  Patient able to use right to help operate lawnmower in 5 weeks-ongoing  Patient able to perform sweeping and mopping with increase use of right for greater ease in performance in 6 weeks-ongoing  Improve FOTO score by 15 points indicating improved function of right hand in 8 weeks-ongoing  Plan   Continue skilled therapy 2 times a week for 4 weeks  including therapeutic exercises and activities, manual therapy, and pain modalities, adjust orthotic as needed       NELY Hines

## 2023-01-05 ENCOUNTER — CLINICAL SUPPORT (OUTPATIENT)
Dept: REHABILITATION | Facility: HOSPITAL | Age: 68
End: 2023-01-05
Payer: MEDICARE

## 2023-01-05 DIAGNOSIS — Z98.890 HX OF HAND SURGERY: Primary | ICD-10-CM

## 2023-01-05 PROCEDURE — 97140 MANUAL THERAPY 1/> REGIONS: CPT | Mod: PN

## 2023-01-05 PROCEDURE — 97110 THERAPEUTIC EXERCISES: CPT | Mod: PN

## 2023-01-05 PROCEDURE — 97022 WHIRLPOOL THERAPY: CPT | Mod: PN

## 2023-01-05 NOTE — PROGRESS NOTES
[facility] Occupational Therapy Daily Treatment Note   Date: 01/05/23  Name: Aleksandar Knight Sr.  Clinic Number: [id number]  Therapy Diagnosis: Right hand pain and stiffness  Physician: Rafi Andre MD  Physician Orders: OT for RT hand, 2 x week for 4 weeks   Medical Diagnosis: Z98.890 (ICD-10-CM) - Hx of hand surgery M72.0 (ICD-10-CM) - Dupuytren's contracture of right hand   Surgical Procedure and Date: 7/27/2022; Right small finger Dupuytren's release open  Evaluation Date: 8/29/2022  Insurance Authorization Period Expiration: 1/31/23  Plan of Care Certification Period: 1/31/23 (updated 12/08)  Date of Return to MD:   Visit # / Visits authorized: Brandin 1 / 1   28/ total of 35    5 left as of 12/29 per referral  Time In:7:25  Time Out: 8:15  Total Billable Time: 50 minutes   Precautions:  Standard          Subjective   Pt reports: The pain is a little less because I'm limiting how much I use it. I automatically went to  a small pot off the stove this morning and had to quickly let it go; pain in the little finger.  Response to previous treatment: better after a while  Functional change: none  Pain:  3/10   5/10 by end of session  Location: right hand   FOTO score: Brandin 8/29  36%  Goal 68%;   9/15  42%     Objective   Patient received the following treatment:   Supervised modalities after being cleared for contradictions for 10 minutes including:      Fluidotherapy to right hand for 10 minutes to increase tissue elasticity, desensitization, sensory re-education and for pain management. Air flow 80  heat 112 degrees   Manual therapy techniques to increase joint mobilization and soft tissue mobilization for 8 minutes including:    -Scar massage to ulnar volar aspect of hand for 8 minutes  to decrease dense adhesions and improve tensile glide. And stretching and holding into finger extension. Adhesion noted at Proximal interphalangeal joint crease   Therapeutic exercises to improve functional  performance while increasing strength, endurance, ROM, and flexibility for 22 minutes, including:  -Active assistive/Passive range of motion of each joint (all fingers) into flexion and extension and composite of each  -joint blocking flexion of little Proximal interphalangeal joint and Distal interphalangeal joint 15 repetitions each  -fingers extension off table top isolated and composite 5 repetitions each  -place and hold in extension of little finger x 15 repetitions   -little finger adduction x 10 repetitions   -therapist holding of MPs in flexion as patient actively fisted x 5 repetitions   -place and hold into fist x 10 repetitions   Therapeutic activities to improve functional performance with use of dynamic activities for 10 minutes  including:  -screwdriver;5 screws in/out of board with gross grasp on screwdriver-noting fatigue  -Instruction and demonstration given of all introduced   Patient required moderate cuing for correct performance of exercise.     Home Exercises and Education Provided   Education provided:  - discussed insurance limitation  - Progress towards goals      Written Home Exercises Provided: 11/15 given GREEN theraputty 10/20 Encouraged sitting with left hand over right to provide stretch into extension while sedentary. 9/07/22 Added: in hand manipulation of objects ~ 2 minutes and flicking of ring and little fingers as above 10 repetitions each 2 times a day. Patient was fitted with a piece of silicone gel sheeting for scar management to be worn under the orthotic. He took a picture of the instructions and precautions and therapist reviewed them with him. Reviewed orthotic guidelines: care (clean with soap and water and keep away from heat source), wear (for 1 hour 3 times during the day and wear while sleeping), precautions (increase in swelling, pain and pressure areas). Patient to call therapist if any problems arise with orthotic for proper adjustments to be made.   Patient  instructed to cont prior HEP. Exercises were reviewed and he was able to demonstrate them prior to the end of the session. he demonstrated good  understanding of the HEP provided.   See EMR under Patient Instructions for exercises provided prior visit.      Assessment   Patient with decrease in pain, however, decrease in use. He noted that he was unable to lift small pot due to pain. Patient performs all exercise with increase in symptoms. He is unable to hold tight fist once placed into it. He continues with mild swelling throughout fingers.  Patient will continue to benefit from skilled OT to further address pain and deficits in ROM and strength which are hindering ability to perform self care and IADLs. Patient's cultural,spiritual, and educational needs were considered.  Goals:  Long Term Goals-increase by 6 weeks  Patient with decrease in pain and increase in motion and strength allowing for greater ease with meal preparation in 5 weeks-ongoing  Patient able to use right to help operate lawnmower in 5 weeks-ongoing  Patient able to perform sweeping and mopping with increase use of right for greater ease in performance in 6 weeks-ongoing  Improve FOTO score by 15 points indicating improved function of right hand in 8 weeks-ongoing  Plan   Continue skilled therapy 2 times a week for 3 weeks  including therapeutic exercises and activities, manual therapy, and pain modalities, adjust orthotic as needed       NELY Hines

## 2023-01-10 ENCOUNTER — CLINICAL SUPPORT (OUTPATIENT)
Dept: REHABILITATION | Facility: HOSPITAL | Age: 68
End: 2023-01-10
Payer: MEDICARE

## 2023-01-10 DIAGNOSIS — Z98.890 HX OF HAND SURGERY: Primary | ICD-10-CM

## 2023-01-10 PROCEDURE — 97110 THERAPEUTIC EXERCISES: CPT | Mod: PN

## 2023-01-10 PROCEDURE — 97022 WHIRLPOOL THERAPY: CPT | Mod: PN

## 2023-01-10 PROCEDURE — 97140 MANUAL THERAPY 1/> REGIONS: CPT | Mod: PN

## 2023-01-10 NOTE — PROGRESS NOTES
[facility] Occupational Therapy Daily Treatment Note   Date: 01/10/23  Name: Aleksandar Knight .  Clinic Number: [id number]  Therapy Diagnosis: Right hand pain and stiffness  Physician: Rafi Andre MD  Physician Orders: OT for RT hand, 2 x week for 4 weeks   Medical Diagnosis: Z98.890 (ICD-10-CM) - Hx of hand surgery M72.0 (ICD-10-CM) - Dupuytren's contracture of right hand   Surgical Procedure and Date: 7/27/2022; Right small finger Dupuytren's release open  Evaluation Date: 8/29/2022  Insurance Authorization Period Expiration: 1/31/23  Plan of Care Certification Period: 1/31/23 (updated 12/08)  Date of Return to MD:   Visit # / Visits authorized: Brandin 1 / 1 29/ total of 35    4 left as of 12/29 per referral  Time In:6:37  Time Out: 7:30  Total Billable Time: 53 minutes   Precautions:  Standard          Subjective   Pt reports: Saturday night with 10/10 pain with sharp shooting pain, numbness and swelling. Saturday he washed and vacuumed out car and truck.  Response to previous treatment: better after a while  Functional change: none  Pain:  3 to 3.5/10   4/10 by end of session  Location: right hand   FOTO score: Brandin 8/29  36%  Goal 68%;   9/15  42%     Objective   Patient received the following treatment:   Supervised modalities after being cleared for contradictions for 10 minutes including:      Fluidotherapy to right hand for 10 minutes to increase tissue elasticity, desensitization, sensory re-education and for pain management. Air flow 80  heat 112 degrees   Manual therapy techniques to increase joint mobilization and soft tissue mobilization for 8 minutes including:    -Scar massage to ulnar volar aspect of hand for 8 minutes  to decrease dense adhesions and improve tensile glide. And stretching and holding into finger extension. Adhesion noted at Proximal interphalangeal joint crease   Therapeutic exercises to improve functional performance while increasing strength, endurance, ROM, and  "flexibility for 32 minutes, including:  -Active assistive/Passive range of motion of each joint (all fingers) into flexion and extension and composite of each  -joint blocking flexion of little Proximal interphalangeal joint and Distal interphalangeal joint 15 repetitions each  -fingers extension off table top isolated and composite 5 repetitions each  -place and hold in extension of little finger x 15 repetitions   -little finger adduction x 10 repetitions   -therapist holding of MPs in flexion as patient actively fisted x 5 repetitions   -place and hold into fist x 10 repetitions   -thin yellow rubberband little Proximal interphalangeal joint flexion x 20 repetitions  Therapeutic activities to improve functional performance with use of dynamic activities for 3 minutes  including:  -holding of 1/2" dowel with gross grasp as depressing in to RED putty   -sponge squeezes at 4th web 10 repetitions x 2     Instruction and demonstration given of all introduced   Patient required moderate cuing for correct performance of exercise.     Home Exercises and Education Provided   Education provided:  - discussed insurance limitation  - Progress towards goals      Written Home Exercises Provided: 11/15 given GREEN theraputty 10/20 Encouraged sitting with left hand over right to provide stretch into extension while sedentary. 9/07/22 Added: in hand manipulation of objects ~ 2 minutes and flicking of ring and little fingers as above 10 repetitions each 2 times a day. Patient was fitted with a piece of silicone gel sheeting for scar management to be worn under the orthotic. He took a picture of the instructions and precautions and therapist reviewed them with him. Reviewed orthotic guidelines: care (clean with soap and water and keep away from heat source), wear (for 1 hour 3 times during the day and wear while sleeping), precautions (increase in swelling, pain and pressure areas). Patient to call therapist if any problems arise " with orthotic for proper adjustments to be made.   Patient instructed to cont prior HEP. Exercises were reviewed and he was able to demonstrate them prior to the end of the session. he demonstrated good  understanding of the HEP provided.   See EMR under Patient Instructions for exercises provided prior visit.      Assessment   Patient noting significant increase in sharp shooting pain in the hand and numbness/tingling of all fingers over the weekend later that day after washing car and truck. During session he experienced numbness in fingers with any gripping and occasional sharp pain. He was unable to grasp with ulnar side of hand tightly on dowel. Patient will continue to benefit from skilled OT to further address pain and deficits in ROM and strength which are hindering ability to perform self care and IADLs. Patient's cultural,spiritual, and educational needs were considered.  Goals:  Long Term Goals-increase by 6 weeks  Patient with decrease in pain and increase in motion and strength allowing for greater ease with meal preparation in 5 weeks-ongoing  Patient able to use right to help operate lawnmower in 5 weeks-ongoing  Patient able to perform sweeping and mopping with increase use of right for greater ease in performance in 6 weeks-ongoing  Improve FOTO score by 15 points indicating improved function of right hand in 8 weeks-ongoing  Plan   Continue skilled therapy 2 times a week for 3 weeks  including therapeutic exercises and activities, manual therapy, and pain modalities, adjust orthotic as needed       NELY Hines

## 2023-01-12 ENCOUNTER — CLINICAL SUPPORT (OUTPATIENT)
Dept: REHABILITATION | Facility: HOSPITAL | Age: 68
End: 2023-01-12
Payer: MEDICARE

## 2023-01-12 DIAGNOSIS — Z98.890 HX OF HAND SURGERY: Primary | ICD-10-CM

## 2023-01-12 PROCEDURE — 97022 WHIRLPOOL THERAPY: CPT | Mod: PN

## 2023-01-12 PROCEDURE — 97140 MANUAL THERAPY 1/> REGIONS: CPT | Mod: PN

## 2023-01-12 PROCEDURE — 97110 THERAPEUTIC EXERCISES: CPT | Mod: PN

## 2023-01-12 NOTE — PROGRESS NOTES
[facility] Occupational Therapy Daily Treatment Note   Date: 01/12/23  Name: Aleksandar Knight Sr.  Clinic Number: [id number]  Therapy Diagnosis: Right hand pain and stiffness  Physician: Rafi Andre MD  Physician Orders: OT for RT hand, 2 x week for 4 weeks   Medical Diagnosis: Z98.890 (ICD-10-CM) - Hx of hand surgery M72.0 (ICD-10-CM) - Dupuytren's contracture of right hand   Surgical Procedure and Date: 7/27/2022; Right small finger Dupuytren's release open  Evaluation Date: 8/29/2022  Insurance Authorization Period Expiration: 1/31/23  Plan of Care Certification Period: 1/31/23 (updated 12/08)  Date of Return to MD:   Visit # / Visits authorized: Brandin 1 / 1 31/ total of 35    2 left as of 12/29 per referral  Time In:6:37  Time Out: 7:30  Total Billable Time: 53 minutes   Precautions:  Standard          Subjective   Pt reports: Swelling went down. Still have the sharp shooting pain at night. Haven't done much with my hand.  Response to previous treatment: better after a while  Functional change: none  Pain:  3 /10     Location: right hand   FOTO score: Eval 8/29  36%  Goal 68%;   9/15  42%     Objective   Patient received the following treatment:   Supervised modalities after being cleared for contradictions for 10 minutes including:      Fluidotherapy to right hand for 10 minutes to increase tissue elasticity, desensitization, sensory re-education and for pain management. Air flow 80  heat 112 degrees   Manual therapy techniques to increase joint mobilization and soft tissue mobilization for 10 minutes including:    -Scar massage to ulnar volar aspect of hand for 10 minutes  to decrease dense adhesions and improve tensile glide. And stretching and holding into finger extension. Adhesion noted at Proximal interphalangeal joint crease   Therapeutic exercises to improve functional performance while increasing strength, endurance, ROM, and flexibility for 30 minutes, including:  -Active  "assistive/Passive range of motion of each joint (all fingers) into flexion and extension and composite of each  -joint blocking flexion of little Proximal interphalangeal joint and Distal interphalangeal joint 15 repetitions each  -fingers extension off table top isolated and composite 5 repetitions each  -place and hold in extension of little finger x 15 repetitions   -little finger adduction x 10 repetitions   -therapist holding of MPs in flexion as patient actively fisted x 5 repetitions   -place and hold into fist x 10 repetitions   -thin yellow rubberband little Proximal interphalangeal joint flexion x 20 repetitions  -Rubberband gripper with 2 large red bands 10 repetitions x 2 repetition   Therapeutic activities to improve functional performance with use of dynamic activities for 3 minutes  including:  -grasping of GREEN putty with ulnar fingers and stretching out x 10 repetitions   --little to palm to hold 3/4" and 1/2" beads as un/laced 12 beads  Instruction and demonstration given of all introduced   Patient required moderate cuing for correct performance of exercise.     Home Exercises and Education Provided   Education provided:  - discussed insurance limitation  - Progress towards goals      Written Home Exercises Provided: 11/15 given GREEN theraputty 10/20 Encouraged sitting with left hand over right to provide stretch into extension while sedentary. 9/07/22 Added: in hand manipulation of objects ~ 2 minutes and flicking of ring and little fingers as above 10 repetitions each 2 times a day. Patient was fitted with a piece of silicone gel sheeting for scar management to be worn under the orthotic. He took a picture of the instructions and precautions and therapist reviewed them with him. Reviewed orthotic guidelines: care (clean with soap and water and keep away from heat source), wear (for 1 hour 3 times during the day and wear while sleeping), precautions (increase in swelling, pain and pressure " areas). Patient to call therapist if any problems arise with orthotic for proper adjustments to be made.   Patient instructed to cont prior HEP. Exercises were reviewed and he was able to demonstrate them prior to the end of the session. he demonstrated good  understanding of the HEP provided.   See EMR under Patient Instructions for exercises provided prior visit.      Assessment   Patient noting decrease in swelling with decrease in use. He experienced numbness at index  finger with passive flexion. He was unable to incorporate little fully with gripper and putty exercise. Pain noted at little volar proximal phalanx with scar mobilization and with gripping. He noted pain and numbness of whole hand increases with use.   Patient will continue to benefit from skilled OT to further address pain and deficits in ROM and strength which are hindering ability to perform self care and IADLs. Patient's cultural,spiritual, and educational needs were considered.  Goals:  Long Term Goals-increase by 6 weeks  Patient with decrease in pain and increase in motion and strength allowing for greater ease with meal preparation in 5 weeks-ongoing  Patient able to use right to help operate lawnmower in 5 weeks-ongoing  Patient able to perform sweeping and mopping with increase use of right for greater ease in performance in 6 weeks-ongoing  Improve FOTO score by 15 points indicating improved function of right hand in 8 weeks-ongoing  Plan   Continue skilled therapy 2 times a week for 1 week  including therapeutic exercises and activities, manual therapy, and pain modalities, adjust orthotic as needed       NELY Hines

## 2023-01-17 ENCOUNTER — CLINICAL SUPPORT (OUTPATIENT)
Dept: REHABILITATION | Facility: HOSPITAL | Age: 68
End: 2023-01-17
Payer: MEDICARE

## 2023-01-17 DIAGNOSIS — Z98.890 HX OF HAND SURGERY: Primary | ICD-10-CM

## 2023-01-17 PROCEDURE — 97022 WHIRLPOOL THERAPY: CPT | Mod: PN

## 2023-01-17 PROCEDURE — 97140 MANUAL THERAPY 1/> REGIONS: CPT | Mod: PN

## 2023-01-17 PROCEDURE — 97110 THERAPEUTIC EXERCISES: CPT | Mod: PN

## 2023-01-17 NOTE — PROGRESS NOTES
[facility] Occupational Therapy Daily Treatment Note   Date: 01/17/23  Name: Aleksandar Knight Sr.  Clinic Number: [id number]  Therapy Diagnosis: Right hand pain and stiffness  Physician: Rafi Andre MD  Physician Orders: OT for RT hand, 2 x week for 4 weeks   Medical Diagnosis: Z98.890 (ICD-10-CM) - Hx of hand surgery M72.0 (ICD-10-CM) - Dupuytren's contracture of right hand   Surgical Procedure and Date: 7/27/2022; Right small finger Dupuytren's release open  Evaluation Date: 8/29/2022  Insurance Authorization Period Expiration: 1/31/23  Plan of Care Certification Period: 1/31/23 (updated 12/08)  Date of Return to MD: Dr. Boucher Feb 6  Visit # / Visits authorized: Brandin 1 / 1 31/ total of 35    1 left as of 12/29 per referral  Time In:6:37  Time Out: 7:30  Total Billable Time: 53 minutes   Precautions:  Standard          Subjective   Pt reports: My hand swelled a lot over the weekend. The sharp pains are in the little and ring fingers and across the palm indicating at index metacarpal head. Numbness going into the index finger now, stays only a little while but happens often; at night frequently all numb and have sharp pains.. Pain over the weekend up to 8/10.  Response to previous treatment: better after a while  Functional change: none  Pain:  3 /10     Location: right hand   FOTO score: Eval 8/29  36%  Goal 68%;   9/15  42%     Objective   Patient received the following treatment:   Supervised modalities after being cleared for contradictions for 10 minutes including:      Paraffin and hot pack to right hand for 10 minutes to increase tissue elasticity, desensitization, sensory re-education and for pain management. Air flow 80  heat 112 degrees   Manual therapy techniques to increase joint mobilization and soft tissue mobilization for 10 minutes including:    -Scar massage to ulnar volar aspect of hand for 13 minutes  to decrease dense adhesions and improve tensile glide. And stretching and  holding into finger extension. Adhesion noted at Proximal interphalangeal joint crease   Therapeutic exercises to improve functional performance while increasing strength, endurance, ROM, and flexibility for 30 minutes, including:  -Active assistive/Passive range of motion of each joint (all fingers) into flexion and extension and composite of each  -joint blocking flexion of little Proximal interphalangeal joint and Distal interphalangeal joint 15 repetitions each  -therapist holding MPs in flexion as actively flexing PIPs and DIPs  -fingers extension off table top isolated and composite 5 repetitions each  -place and hold in extension and abduction of little finger x 15 repetitions   -all fingers abduction and adduction x 10 repetitions   -therapist holding of MPs in flexion as patient actively fisted x 5 repetitions   -place and hold into fist x 10 repetitions   -Rubberband gripper with 1 large red band (decreased due to pain) x 20 repetitions     Instruction and demonstration given of all introduced   Patient required moderate cuing for correct performance of exercise.     Home Exercises and Education Provided   Education provided:  - discussed insurance limitation  - Progress towards goals      Written Home Exercises Provided: 11/15 given GREEN theraputty 10/20 Encouraged sitting with left hand over right to provide stretch into extension while sedentary. 9/07/22 Added: in hand manipulation of objects ~ 2 minutes and flicking of ring and little fingers as above 10 repetitions each 2 times a day. Patient was fitted with a piece of silicone gel sheeting for scar management to be worn under the orthotic. He took a picture of the instructions and precautions and therapist reviewed them with him. Reviewed orthotic guidelines: care (clean with soap and water and keep away from heat source), wear (for 1 hour 3 times during the day and wear while sleeping), precautions (increase in swelling, pain and pressure areas).  Patient to call therapist if any problems arise with orthotic for proper adjustments to be made.   Patient instructed to cont prior HEP. Exercises were reviewed and he was able to demonstrate them prior to the end of the session. he demonstrated good  understanding of the HEP provided.   See EMR under Patient Instructions for exercises provided prior visit.      Assessment   Patient with increase in pain and frequency of swelling - noted didn't try using hand as did so previously. Episodes of numbness extending into index finger. Sharp pains persisting. Had to decrease resistive gripping-unable to perform with previous resistance level due to pain. Patient will continue to benefit from skilled OT to further address pain and deficits in ROM and strength which are hindering ability to perform self care and IADLs. Patient's cultural,spiritual, and educational needs were considered.  Goals:  Long Term Goals-increase by 6 weeks  Patient with decrease in pain and increase in motion and strength allowing for greater ease with meal preparation in 5 weeks-ongoing  Patient able to use right to help operate lawnmower in 5 weeks-ongoing  Patient able to perform sweeping and mopping with increase use of right for greater ease in performance in 6 weeks-ongoing  Improve FOTO score by 15 points indicating improved function of right hand in 8 weeks-ongoing  Plan   Continue skilled therapy 2 times a week for 1 week  including therapeutic exercises and activities, manual therapy, and pain modalities, adjust orthotic as needed       NELY Hines

## 2023-01-19 ENCOUNTER — CLINICAL SUPPORT (OUTPATIENT)
Dept: REHABILITATION | Facility: HOSPITAL | Age: 68
End: 2023-01-19
Payer: MEDICARE

## 2023-01-19 DIAGNOSIS — Z98.890 HX OF HAND SURGERY: Primary | ICD-10-CM

## 2023-01-19 PROCEDURE — 97110 THERAPEUTIC EXERCISES: CPT | Mod: PN

## 2023-01-19 PROCEDURE — 97140 MANUAL THERAPY 1/> REGIONS: CPT | Mod: PN

## 2023-01-19 PROCEDURE — 97022 WHIRLPOOL THERAPY: CPT | Mod: PN

## 2023-01-19 NOTE — PLAN OF CARE
[facility] Occupational Therapy Daily Treatment/Discharge Note   Date: 01/19/23  Name: Aleksandar Knight Sr.  Clinic Number: [id number]  Therapy Diagnosis: Right hand pain and stiffness  Physician: Rafi Andre MD  Physician Orders: OT for RT hand, 2 x week for 4 weeks   Medical Diagnosis: Z98.890 (ICD-10-CM) - Hx of hand surgery M72.0 (ICD-10-CM) - Dupuytren's contracture of right hand   Surgical Procedure and Date: 7/27/2022; Right small finger Dupuytren's release open  Evaluation Date: 8/29/2022  Insurance Authorization Period Expiration: 1/31/23  Plan of Care Certification Period: 1/31/23 (updated 12/08)  Date of Return to MD: Dr. Boucher Feb 6  Visit # / Visits authorized: Catinaal 1 / 1 31/ total of 35    13/29 per referral  Time In:6:40  Time Out: 7:30  Total Billable Time: 50 minutes   Precautions:  Standard          Subjective   Pt reports: Hand not as sore; swelling down.   Response to previous treatment: better after a while  Functional change: none  Pain:  worst 8 /10   least 3/10 sharp and numbness  Location: right hand   FOTO score: Eval 8/29  36%  Goal 68%;   9/15  42%    1/19/23  36%     Objective   Hand -  Strength  Jaymar dynamometer 2nd Rung in lbs;  Pinches:pinch gauge average of 2 trials in psi    1/19/23 12/08/22 11/01/22 9/29/22 9/29/22   Position Right Right Right Right Left   Hand : Trial 1 25 46 45 40 65   Hand : Trial 2 32 45 45 32 65   Hand : Trial 3 30 44 40 36 55    Average 29 45 43 36 61    A/PROM                                       RIGHT                                         10/04/22                     INDEX    LONG      RING     LITTLE                 MCP Ext-Flexion       0-60        0-60        10-60      10-65  PIP Ext-Flexion         0-90        0-95        15-80      20-85  DIP Ext-Flexion         0-50        0-70         0-60        0-65   1/19/2023                INDEX    LONG      RING     LITTLE                 MCP Ext-Flexion       0-70   "      0-70        10-65       10-55  PIP Ext-Flexion         0-90        5-85        15- 80      20-70  DIP Ext-Flexion         0-50        0-70          0-55         0-55  *post heat, massage and stretches able to achieve 10 more degrees in flexion at little MP and Proximal interphalangeal joint     Patient received the following treatment:   Supervised modalities after being cleared for contradictions for 10 minutes including:      Fluidotherapy to right hand for 10 minutes to increase tissue elasticity, desensitization, sensory re-education and for pain management. Air flow 80  heat 112 degrees   Manual therapy techniques to increase joint mobilization and soft tissue mobilization for 10 minutes including:    -Scar massage to ulnar volar aspect of hand for 10 minutes  to decrease dense adhesions and improve tensile glide. And stretching and holding into finger extension. Adhesion noted at Proximal interphalangeal joint crease   Therapeutic exercises to improve functional performance while increasing strength, endurance, ROM, and flexibility for 30 minutes, including:  -Active assistive/Passive range of motion of each joint (all fingers) into flexion and extension and composite of each  -joint blocking flexion of little Proximal interphalangeal joint and Distal interphalangeal joint 15 repetitions each  -therapist holding MPs in flexion as actively flexing PIPs and DIPs  -fingers extension off table top isolated and composite 5 repetitions each  -place and hold in extension and abduction of little finger x 15 repetitions   -all fingers abduction and adduction x 10 repetitions   -therapist holding of MPs in flexion as patient actively fisted x 5 repetitions   -place and hold into fist x 10 repetitions   -GREEN theraputty gripping and pulling and cookie cutting with 1" cardboard dowel  Instruction and demonstration given of all introduced   Patient required moderate cuing for correct performance of exercise.   "   Home Exercises and Education Provided   Education provided:  - discussed insurance limitation  - Progress towards goals      Written Home Exercises Provided: 11/15 given GREEN theraputty 10/20 Encouraged sitting with left hand over right to provide stretch into extension while sedentary. 9/07/22 Added: in hand manipulation of objects ~ 2 minutes and flicking of ring and little fingers as above 10 repetitions each 2 times a day. Patient was fitted with a piece of silicone gel sheeting for scar management to be worn under the orthotic. He took a picture of the instructions and precautions and therapist reviewed them with him. Reviewed orthotic guidelines: care (clean with soap and water and keep away from heat source), wear (for 1 hour 3 times during the day and wear while sleeping), precautions (increase in swelling, pain and pressure areas). Patient to call therapist if any problems arise with orthotic for proper adjustments to be made.   Patient instructed to cont prior HEP. Exercises were reviewed and he was able to demonstrate them prior to the end of the session. he demonstrated good  understanding of the HEP provided.   See EMR under Patient Instructions for exercises provided prior visit.      Assessment   Patient seen 2 times a week since eval on 8/29. Patient was making progress, however, as increased use of hand he began with increase in swelling, pain and recently with numbness extending at times into index finger. Numbness occurring more frequently with fisting/gripping. Sharp pains persisting primarily at palm and into little finger.  His scar is very sensitive especially at little proximal phalanx. Patient's daily activities are very limited due to limited use of right hand.  Patient's cultural,spiritual, and educational needs were considered.  Goals:  Not Met  Long Term Goals-increase by 6 weeks  Patient with decrease in pain and increase in motion and strength allowing for greater ease with meal  preparation in 5 weeks-ongoing  Patient able to use right to help operate lawnmower in 5 weeks-ongoing  Patient able to perform sweeping and mopping with increase use of right for greater ease in performance in 6 weeks-ongoing  Improve FOTO score by 15 points indicating improved function of right hand in 8 weeks-ongoing  Plan   Discontinue therapy. Patient's status is declining. He is scheduled with a hand surgeon on Feb 6.      NELY Hines

## 2023-01-24 ENCOUNTER — DOCUMENTATION ONLY (OUTPATIENT)
Dept: REHABILITATION | Facility: HOSPITAL | Age: 68
End: 2023-01-24
Payer: MEDICARE

## 2023-01-24 NOTE — PROGRESS NOTES
[facility]: Occupational Therapy   Date: 01/24/23  Name: Aleksandar Knight Sr.  Clinic Number: [id number]  Therapy Diagnosis: Right hand pain and stiffness  Surgical Procedure and Date: 7/27/2022; Right small finger Dupuytren's release open  Visit #   44   Pain:  worst 8 /10   least 3/10 sharp and numbness  Objective   Hand -  Strength  Jaymar dynamometer 2nd Rung in lbs;  Pinches:pinch gauge average of 2 trials in psi    1/19/23 12/08/22 11/01/22 9/29/22 9/29/22   Position Right Right Right Right Left   Hand : Trial 1 25 46 45 40 65   Hand : Trial 2 32 45 45 32 65   Hand : Trial 3 30 44 40 36 55    Average 29 45 43 36 61    A/PROM                                       RIGHT                                         10/04/22                     INDEX    LONG      RING     LITTLE                 MCP Ext-Flexion       0-60        0-60        10-60      10-65  PIP Ext-Flexion         0-90        0-95        15-80      20-85  DIP Ext-Flexion         0-50        0-70         0-60        0-65   1/19/2023                INDEX    LONG      RING     LITTLE                 MCP Ext-Flexion       0-70        0-70        10-65       10-55  PIP Ext-Flexion         0-90        5-85        15- 80      20-70  DIP Ext-Flexion         0-50        0-70          0-55         0-55  *post heat, massage and stretches able to achieve 10 more degrees in flexion at little MP and Proximal interphalangeal joint   Assessment   Patient seen 2 times a week since eval on 8/29. Patient was making progress, however, as increased use of hand he began with increase in swelling, pain and recently with numbness extending at times into index finger. Numbness occurring more frequently with fisting/gripping. Sharp pains persisting primarily at palm and into little finger.  His scar is very sensitive especially at little proximal phalanx. Patient's daily activities are very limited due to limited use of right hand.   Patient  discharge on 1/19/23.

## 2023-03-14 DIAGNOSIS — G56.01 CARPAL TUNNEL SYNDROME ON RIGHT: ICD-10-CM

## 2023-03-14 DIAGNOSIS — M25.631 LIMITATION OF JOINT MOTION OF WRIST, RIGHT: ICD-10-CM

## 2023-03-14 DIAGNOSIS — M25.431 EFFUSION OF JOINT OF RIGHT WRIST: Primary | ICD-10-CM

## 2023-03-15 ENCOUNTER — DOCUMENTATION ONLY (OUTPATIENT)
Dept: REHABILITATION | Facility: HOSPITAL | Age: 68
End: 2023-03-15

## 2023-03-15 ENCOUNTER — CLINICAL SUPPORT (OUTPATIENT)
Dept: REHABILITATION | Facility: HOSPITAL | Age: 68
End: 2023-03-15
Payer: MEDICARE

## 2023-03-15 DIAGNOSIS — Z98.890 HX OF HAND SURGERY: Primary | ICD-10-CM

## 2023-03-15 PROCEDURE — 97165 OT EVAL LOW COMPLEX 30 MIN: CPT | Mod: PN

## 2023-03-15 NOTE — PLAN OF CARE
[facility] Occupational Therapy  Initial Evaluation   Date: 3/15/2023  Name: Aleksandar Knight Sr.  Clinic Number: [id number]  Therapy Diagnosis: right hand pain  Physician: Gregor Boucher MD  Physician Orders: eval and treat carpal tunnel protocol  Medical Diagnosis: Guyon's canal release  Surgical Procedure and Date: Carpal tunnel and Guyon's canal release on 3/01/23  Evaluation Date: 3/15/2023  Insurance Authorization Period Expiration: 4/12/2023  Plan of Care Certification Period: 5/30/2023  Date of Return to MD: ?  Visit # / Visits authorized: Eval 1/1 12 approved  Time In:7:47  Time Out: 8:30  Total Billable Time: 0 minutes    Precautions:  Standard    Subjective   Patient states: No longer with tingling and numbness along ulnar aspect of hand. Soreness,tingling/numbness at CT surgery site. Still very sensitive along scar at little finger.  Doctor said we will probably have to have surgery again to remove the scar tissue.  Involved Side: right  Dominant Side: Right  Date of Onset: many months ago  History of Current Condition/Mechanism of Injury: patient underwent Dupuytren's release 7/27/22. Developed numbness and severe pain and limited range post  Imaging: none  Patient reported had nerve conduction that was positive for ulnar nerve and carpal tunnel   Previous Therapy:  for 4 and 1/2 months post Dupuytren's release in 7/2022    Past Medical History/Physical Systems Review:    has a past medical history of Dupuytren contracture, Hyperlipidemia, and Hypertension.   has a past surgical history that includes Colonoscopy (N/A, 11/20/2019) and Dupuytren contracture release (Right, 7/27/2022).  has a current medication list which includes the following prescription(s): amlodipine-benazepril 10-20mg, atorvastatin, methylprednisolone, and tamsulosin.    Review of patient's allergies indicates:  No Known Allergies     Patient's Goals for Therapy: use of hand without pain stronger make a  fist    Pain:  Functional Pain Scale Rating 0-10: at eval  3/10 at little finger   surgical 3/10  3/10 at least  5/10 at worst  Location: right hand   Description: Aching constant  Aggravating Factors: movement  Easing Factors: rest    Occupation:  retired  Functional Limitations/Social History:  Previous functional status includes: Independent with all self care and home management.  Current Functional Status   Home/Living environment : lives alone      ADL Assessment  ADL    Dressing Uses right 75% for activity   Eating Able to use right   Cooking Unable to lift anything of weight   hasn't attempted opening screw tops; unable to lift gallon jug   Bathing/Grooming independent   Household tasks Hasn't attempted    By patient report             FOTO score:  3/15/23 41%  goal 62%  Objective   Observation:  mild swelling, surgical site closed  Palpation:  moderate tenderness at CT site    Wrist -  Range Of Motion      3/15/2023                  Motion A/PROM Left   Flexion 30/45   Extension 30/50   Ulnar Deviation 20/30   Radial Deviation 20/NT     HAND AROM          3/15/2023                                                             Extension-Flexion     RIGHT                                                               INDEX   LONG    RING    LITTLE           MCP   0-79      0-65       10-75    15-70  PIP     0-95      0-90       10-90    20-95  DIP     0-35      0-60        0-50       0-80   Home Exercise Program/Education:  Issued home exercise program tendon glides and scar massage (see patient instructions in EMR) and educated on modality use for pain management . Exercises were reviewed and he was able to demonstrate them prior to the end of the session. Pt received a written copy of exercises to perform at home. He demonstrated good  understanding of the education provided.  Pt was advised to perform these exercises free of pain  Patient Education: role of OT, goals for OT, scheduling/cancellations - pt  verbalized understanding. Discussed insurance limitations with patient.    Assessment     Patient is a 67 y.o. right handed male presenting to skilled OT with diagnosis of status post right carpal tunnel and Guyon' canal release on 3/01/23. Patient with pain of 3/10 to 5/10 described as constant ache.  He is moderately tender to palpation at surgical site and into and very sensitive to touch at base of little finger at scar from Dupuytren's surgery.  He has mild swelling at surgical site. His ROM is moderately limited at wrist and fingers.  He reports independence with self care with compensation and unable to perform some daily activities. His FOTO score: 41%.  A brief history was obtained from the patient and MD note.  During the evaluation, the patient required no  modification or assistance.  There are no anticipated barriers/co-morbid conditions/personal factors may affect the plan of care.   Patient will benefit from OT to address problems hindering functional use of UE. The following goals were discussed with the patient and patient is in agreement with them as to be addressed in the treatment plan. The patient's rehab potential is Good. Patient's educational, spiritual, cultural needs were considered.       Goals:   Short Term Goals  Independent in home program of ROM in 2 visits  Patient with full use of right hand for all self care in 2 weeks  Patient able to open screw top containers with no difficulty in 3 weeks  Patient with decrease in pain to 1/10 at surgical site and full wrist ROM in 3 weeks  Long Term Goals  Patient with no pain at surgery site and increase in fingers flexion to full fist in 5 weeks  Patient able to grasp and stabilize objects with right in 6 weeks  Patient able to carry and lift up to 8# with the right hand in 6 weeks  Improve FOTO score by 12 points indicating improved function of right hand in 6 weeks  Plan   Outpatient Occupational Therapy 2 times weekly for 4 weeks then may  decrease to 1 time a week for 2 weeks (will reassess periodically and adjust as needed) to include the following interventions: . Home Exercise and Stretching, Patient Education, Therapeutic Exercise (97851) - Improve muscle strength, ROM, flexibility and muscle function, Manual Stretching (49344) - Passive or Active stretching to improve muscle length and function, Soft Tissue Mobs (28769) - Increase ROM tissue length, joint mechanics and modulate pain, Cryotherapy (66527) - Application of cold to decrease local swelling and decrease pain , Heat (52822) -  Application of heat to increase local circulation and decrease pain, Ultrasound (50064) - Increase local circulation, improve tissue healing time, and modulate pain, Whirlpool/fluidotherapy (16916) - Increase local tissue circulation, improve elasticity of tissues, increased blood flow for improved muscle strength, ROM, flexibility and function, Therapeutic activities (39498) use of dynamic activities to improve functional performance, Kinesio taping.      NELY Hines

## 2023-03-15 NOTE — PROGRESS NOTES
[facility] Occupational Therapy  Initial Plan of Care   Date: 3/15/2023  Name: Aleksandar Knight Sr.  Clinic Number: [id number]  Therapy Diagnosis: right hand pain  Physician: Gregor Boucher MD  Medical Diagnosis: Guyon's canal release  Surgical Procedure and Date: Carpal tunnel and Guyon's canal release on 3/01/23  Evaluation Date: 3/15/2023  Plan of Care Certification Period: 5/30/2023  Visit # / Visits authorized: Eval 1/1  12 approved    Subjective   Patient states: No longer with tingling and numbness along ulnar aspect of hand. Soreness,tingling/numbness at CT surgery site. Still very sensitive along scar at little finger.  Pain:  Functional Pain Scale Rating 0-10: at eval  3/10 at little finger   surgical 3/10  3/10 at least  5/10 at worst  Assessment     Patient is a 67 y.o. right handed male presenting to skilled OT with diagnosis of status post right carpal tunnel and Guyon' canal release on 3/01/23. Patient with pain of 3/10 to 5/10 described as constant ache.  He is moderately tender to palpation at surgical site and into and very sensitive to touch at base of little finger at scar from Dupuytren's surgery.  He has mild swelling at surgical site. His ROM is moderately limited at wrist and fingers.  He reports independence with self care with compensation and unable to perform some daily activities. His FOTO score: 41%.  A brief history was obtained from the patient and MD note.  During the evaluation, the patient required no  modification or assistance.  There are no anticipated barriers/co-morbid conditions/personal factors may affect the plan of care.   Patient will benefit from OT to address problems hindering functional use of UE. The following goals were discussed with the patient and patient is in agreement with them as to be addressed in the treatment plan. The patient's rehab potential is Good. Patient's educational, spiritual, cultural needs were considered.       Goals:   Short  Term Goals  Independent in home program of ROM in 2 visits  Patient with full use of right hand for all self care in 2 weeks  Patient able to open screw top containers with no difficulty in 3 weeks  Patient with decrease in pain to 1/10 at surgical site and full wrist ROM in 3 weeks  Long Term Goals  Patient with no pain at surgery site and increase in fingers flexion to full fist in 5 weeks  Patient able to grasp and stabilize objects with right in 6 weeks  Patient able to carry and lift up to 8# with the right hand in 6 weeks  Improve FOTO score by 12 points indicating improved function of right hand in 6 weeks  Plan   Outpatient Occupational Therapy 2 times weekly for 4 weeks then may decrease to 1 time a week for 2 weeks (will reassess periodically and adjust as needed) to include the following interventions: . Home Exercise and Stretching, Patient Education, Therapeutic Exercise (98125) - Improve muscle strength, ROM, flexibility and muscle function, Manual Stretching (41475) - Passive or Active stretching to improve muscle length and function, Soft Tissue Mobs (75069) - Increase ROM tissue length, joint mechanics and modulate pain, Cryotherapy (44426) - Application of cold to decrease local swelling and decrease pain , Heat (43986) -  Application of heat to increase local circulation and decrease pain, Ultrasound (93472) - Increase local circulation, improve tissue healing time, and modulate pain, Whirlpool/fluidotherapy (38291) - Increase local tissue circulation, improve elasticity of tissues, increased blood flow for improved muscle strength, ROM, flexibility and function, Therapeutic activities (15001) use of dynamic activities to improve functional performance,     NELY Hines      I read, reviewed and agree with above Plan of Care      ------------------------------------------------------------------------  MD Erica Tobar

## 2023-03-22 ENCOUNTER — CLINICAL SUPPORT (OUTPATIENT)
Dept: REHABILITATION | Facility: HOSPITAL | Age: 68
End: 2023-03-22
Payer: MEDICARE

## 2023-03-22 DIAGNOSIS — Z98.890 HX OF HAND SURGERY: Primary | ICD-10-CM

## 2023-03-22 PROCEDURE — 97140 MANUAL THERAPY 1/> REGIONS: CPT | Mod: PN

## 2023-03-22 PROCEDURE — 97110 THERAPEUTIC EXERCISES: CPT | Mod: PN

## 2023-03-22 PROCEDURE — 97022 WHIRLPOOL THERAPY: CPT | Mod: PN

## 2023-03-22 NOTE — PROGRESS NOTES
[facility] Occupational Therapy Daily Treatment Note   Date: 3/22/2023  Name: Aleksandar Knight Sr.  Clinic Number: [id number]  Therapy Diagnosis: right hand pain  Physician: Gregor Boucher MD  Physician Orders: eval and treat carpal tunnel protocol  Medical Diagnosis: Guyon's canal release  Surgical Procedure and Date: Carpal tunnel and Guyon's canal release on 3/01/23  Evaluation Date: 3/15/2023  Insurance Authorization Period Expiration: 4/12/2023  Plan of Care Certification Period: 5/30/2023  Date of Return to MD: 3/30/23  Visit # / Visits authorized: 2/12 including eval  Time In:6:50  Time Out: 7:45  Total Billable Time: 55 minutes    Precautions:   Standard  post op    Subjective   Pt reports: It's about 80% better since the surgery.    was compliant with home exercise program given    Response to previous treatment:better  Functional change: able to pick   Pain: 2/10 at surgery site, 2.5/10 at little finger and up to 3/10 at long Proximal interphalangeal joint; long finger pain decreases with movement  Location: right hand      Objective    Patient received the following treatment:   Supervised modalities after being cleared for contradictions for 10 minutes including:      Fluidotherapy to right hand for 10 minutes to increase tissue elasticity, desensitization, sensory re-education and for pain management. Air flow 80 heat 112 degrees with Active range of motion in it    Manual therapy techniques to increase joint mobilization and soft tissue mobilization for 10 minutes including:    -Scar massage to right hand for 4 minutes  to decrease dense adhesions and improve tensile glide.   -Soft tissue mobilization to  right hand area for 5 mins  to increase tissue elasticity and decrease pain   - Joint mobilizations, grade II for 1 minutes to increase joint mobility, ROM and for pain management. Long Proximal interphalangeal joint distraction     Therapeutic exercises to improve functional  performance while increasing strength, endurance, ROM,  and flexibility  for 35  minutes,    including:   -AA/PROM flexion, extension, deviation 10 reps each  -Active assistive/Passive range of motion of fingers each finger then composite and place and hold in a fist   - Free Weight-3# wrist flexion, extension, deviation 20 reps each  -RED theaputty gripping, rolling out and theraputty     Instruction and demonstration given of all introduced   Patient required moderate cuing for correct performance of ex.    Home Exercises and Education Provided   Education provided:  - discussed insurance limitation  - Progress towards goals     Written Home Exercises Provided: Patient instructed to cont prior HEP.  Exercises were reviewed and he was able to demonstrate them prior to the end of the session. he demonstrated good  understanding of the HEP provided.   See EMR under Patient Instructions for exercises provided prior visit.     Assessment   Patient noting significant decrease in pain since surgery. Swelling at CT and pain at little finger persisting. He is able to make a fist with effort and appears to be extending fingers easier. Patient noting discomfort with pressure at CT into theraputty. Effort required to complete wrist free weight exercise. He reports improved ability to use right for light activities. Pt  will continue to benefit from skilled OT to further address pain and deficits in ROM and strength which are hindering ability to perform self care and IADLs. Patient's cultural,spiritual, and educational needs were considered    Goals:  Short Term Goals  Independent in home program of ROM in 2 visits-met  Patient with full use of right hand for all self care in 2 weeks  Patient able to open screw top containers with no difficulty in 3 weeks  Patient with decrease in pain to 1/10 at surgical site and full wrist ROM in 3 weeks  Plan   Continue skilled therapy 2 times a week for 4 weeks then may decrease to 1 time  a week for 2 weeks including therapeutic exercises and activities, manual therapy, and pain modalities  as needed   next session: assess  and pinch strengths      NELY Hines

## 2023-03-27 ENCOUNTER — CLINICAL SUPPORT (OUTPATIENT)
Dept: REHABILITATION | Facility: HOSPITAL | Age: 68
End: 2023-03-27
Payer: MEDICARE

## 2023-03-27 DIAGNOSIS — Z98.890 HX OF HAND SURGERY: Primary | ICD-10-CM

## 2023-03-27 PROCEDURE — 97140 MANUAL THERAPY 1/> REGIONS: CPT | Mod: PN

## 2023-03-27 PROCEDURE — 97530 THERAPEUTIC ACTIVITIES: CPT | Mod: PN

## 2023-03-27 PROCEDURE — 97110 THERAPEUTIC EXERCISES: CPT | Mod: PN

## 2023-03-27 PROCEDURE — 97022 WHIRLPOOL THERAPY: CPT | Mod: PN

## 2023-03-27 NOTE — PROGRESS NOTES
[facility] Occupational Therapy Daily Treatment Note   Date: 3/27/2023  Name: Aleksandar Knight Sr.  Clinic Number: [id number]  Therapy Diagnosis: right hand pain  Physician: Gregor Boucher MD  Physician Orders: eval and treat carpal tunnel protocol  Medical Diagnosis: Guyon's canal release  Surgical Procedure and Date: Carpal tunnel and Guyon's canal release on 3/01/23  Evaluation Date: 3/15/2023  Insurance Authorization Period Expiration: 4/12/2023  Plan of Care Certification Period: 5/30/2023  Date of Return to MD: 3/30/23  Visit # / Visits authorized: 2/12 including eval  Time In:7:50  Time Out: 8:45  Total Billable Time: 55 minutes    Precautions:   Standard  post op    Subjective   Pt reports: Tried to paint. Able to hold onto brush but couldn't actually paint. It would fall out of my hand. Duct taped it to my hand.  Can't lift a gallon of milk.   was compliant with home exercise program given    Response to previous treatment:better  Functional change: able to pick   Pain: 2/10 at surgery site  burning into thumb at times  Location: right hand      Objective    Hand -  Strength  Jaymar dynamometer 2nd Rung in lbs  ;  Pinches:pinch gauge average of 2 trials in psi    3/27/23   Position Right   Hand : Trial 1 35   Hand : Trial 2 31   Hand : Trial 3 31    Average 32   Pinch Lateral 18.5   3 jaw chantelle 10.5   Tip 11     Patient received the following treatment:   Supervised modalities after being cleared for contradictions for 10 minutes including:      Fluidotherapy to right hand for 10 minutes to increase tissue elasticity, desensitization, sensory re-education and for pain management. Air flow 80 heat 112 degrees with Active range of motion in it    Manual therapy techniques to increase joint mobilization and soft tissue mobilization for 10 minutes including:    -Scar massage to right hand for 5 minutes  to decrease dense adhesions and improve tensile glide.   -Soft  "tissue mobilization to  right hand area for 5 minutes  to increase tissue elasticity and decrease pain  Therapeutic exercises to improve functional performance while increasing strength, endurance, ROM,  and flexibility for 15 minutes,    including:   -Active assistive/Passive range of motion wrist flexion, extension, deviation 10 repetitions each  -Active assistive/Passive range of motion of fingers each finger then composite and place and hold in a fist  -Passive stretch of little into extension holding 10 seconds x 5 repetitions   -GREEN theraputty gripping, rolling out to stretch little finger and pinching with 3 jaw chatnelle   Therapeutic activities to improve functional performance with use of dynamic activities for 20 minutes  including:  -GREEN theraputty "cookie cutting" with 1" cardboard dowel  -In hand manipulation of 1/2" and 3/4" beads 2 at a times total of 24 beads  -RED flexbar simulated towel wringing; verbal cuing required to emphasize wrist flexion x 30 repetitions    Instruction and demonstration given of all introduced   Patient required moderate cuing for correct performance of exercise.    Home Exercises and Education Provided   Education provided:  - discussed insurance limitation  - Progress towards goals     Written Home Exercises Provided: Patient instructed to cont prior HEP.  Exercises were reviewed and he was able to demonstrate them prior to the end of the session. he demonstrated good  understanding of the HEP provided.   See EMR under Patient Instructions for exercises provided prior visit.     Assessment   Patient continues with burning at times into thenar eminence. He continues with significant pain and burning at little finger especially with stretch into extension. He had moderate difficulty manipulating beads with little and ring fingers. His right  strength is 52% of the left.  Pt  will continue to benefit from skilled OT to further address pain and deficits in ROM and " strength which are hindering ability to perform self care and IADLs. Patient's cultural,spiritual, and educational needs were considered    Goals:  Short Term Goals  Independent in home program of ROM in 2 visits-met  Patient with full use of right hand for all self care in 2 weeks-ongoing  Patient able to open screw top containers with no difficulty in 3 weeks-ongoing  Patient with decrease in pain to 1/10 at surgical site and full wrist ROM in 3 weeks-ongoing  Plan   Continue skilled therapy 2 times a week for 3 weeks then may decrease to 1 time a week for 2 weeks including therapeutic exercises and activities, manual therapy, and pain modalities  as needed   next session: NELY Hernandez

## 2023-03-28 NOTE — PROGRESS NOTES
[facility]   Date: 3/29/2023  Name: Aleksandar Knight Sr.  Clinic Number: [id number]  Therapy Diagnosis: right hand pain  Physician: Gregor Boucher MD  Physician Orders: eval and treat carpal tunnel protocol  Medical Diagnosis: Guyon's canal release  Surgical Procedure and Date: Carpal tunnel and Guyon's canal release on 3/01/23  Evaluation Date: 3/15/2023  Insurance Authorization Period Expiration: 4/12/2023  Plan of Care Certification Period: 5/30/2023  Date of Return to MD: 3/30/23  Visit # / Visits authorized: 4/12 including eval  Time In:6:50  Time Out: 7:45  Total Billable Time: 55 minutes    Precautions:   Standard  post op    Subjective   Pt reports: Worked the putty a lot yesterday. Think I over did it. Had the electrical pain about 10 times last night. Indicated along periphery of base of hand    was compliant with home exercise program given    Response to previous treatment:better  Functional change: able to pick   Pain: 2/10 at surgery site  burning into thumb at times  Location: right hand      Objective   Wrist -  Range Of Motion    3/29                        3/15/2023                  Motion  A/PROM  Left A/PROM Left   Flexion 30 30/45   Extension 35/50 30/50   Ulnar Deviation 20 20/30   Radial Deviation 20 20/NT      HAND AROM          3/29/2023                                                             Extension-Flexion     RIGHT                                                               INDEX   LONG    RING    LITTLE           MCP   0-75      0-65       10-75    0-70  PIP     0-95      0-90       10-90    20-95/10 passive  DIP     0-50     0-60        0-55       0-70    Patient received the following treatment:   Supervised modalities after being cleared for contradictions for 10 minutes including:      Fluidotherapy to right hand for 10 minutes to increase tissue elasticity, desensitization, sensory re-education and for pain  management. Air flow 80 heat 112 degrees with Active range of motion in it    Manual therapy techniques to increase joint mobilization and soft tissue mobilization for 10 minutes including:    -Scar massage to right hand for 5 minutes  to decrease dense adhesions and improve tensile glide.   -Soft tissue mobilization to  right hand area for 5 minutes  to increase tissue elasticity and decrease pain  Therapeutic exercises to improve functional performance while increasing strength, endurance, ROM,  and flexibility for 30 minutes,    including:   -Active assistive/Passive range of motion wrist flexion, extension, deviation 10 repetitions each  -Active assistive/Passive range of motion of fingers each finger then composite and place and hold in a fist  -Passive stretch of little and ring into extension holding 10 seconds x 5 repetitions   -Wrist free weight 3#  20 repetitions each flexion, extension and deviation  Therapeutic activities to improve functional performance with use of dynamic activities for 5 minutes  including:  ADDED: screw board placing and removing 7 screws with gross  on screw     Instruction and demonstration given of all introduced   Patient required moderate cuing for correct performance of exercise.    Home Exercises and Education Provided   Education provided:  - discussed insurance limitation  - Progress towards goals     Written Home Exercises Provided: Patient instructed to cont prior HEP.  Exercises were reviewed and he was able to demonstrate them prior to the end of the session. he demonstrated good  understanding of the HEP provided.   See EMR under Patient Instructions for exercises provided prior visit.     Assessment   Patient noting electrical shocking pain at periphery of base of hand last night post gripping on theraputty. He is exhibiting increase in wrist extension of 5 degrees.  strength continues to be weak hindering daily activities. Pt  will continue to benefit  from skilled OT to further address pain and deficits in ROM and strength which are hindering ability to perform self care and IADLs. Patient's cultural,spiritual, and educational needs were considered    Goals:  Short Term Goals  Independent in home program of ROM in 2 visits-met  Patient with full use of right hand for all self care in 2 weeks-ongoing  Patient able to open screw top containers with no difficulty in 3 weeks-ongoing  Patient with decrease in pain to 1/10 at surgical site and full wrist ROM in 3 weeks-ongoing  Plan   Continue skilled therapy 2 times a week for 3 weeks then may decrease to 1 time a week for 2 weeks including therapeutic exercises and activities, manual therapy, and pain modalities  as needed   next session: NELY Enamorado

## 2023-03-29 ENCOUNTER — CLINICAL SUPPORT (OUTPATIENT)
Dept: REHABILITATION | Facility: HOSPITAL | Age: 68
End: 2023-03-29
Payer: MEDICARE

## 2023-03-29 DIAGNOSIS — Z98.890 HX OF HAND SURGERY: Primary | ICD-10-CM

## 2023-03-29 PROCEDURE — 97110 THERAPEUTIC EXERCISES: CPT | Mod: PN

## 2023-03-29 PROCEDURE — 97022 WHIRLPOOL THERAPY: CPT | Mod: PN

## 2023-03-29 PROCEDURE — 97140 MANUAL THERAPY 1/> REGIONS: CPT | Mod: PN

## 2023-04-04 ENCOUNTER — CLINICAL SUPPORT (OUTPATIENT)
Dept: REHABILITATION | Facility: HOSPITAL | Age: 68
End: 2023-04-04
Payer: MEDICARE

## 2023-04-04 DIAGNOSIS — Z98.890 HX OF HAND SURGERY: Primary | ICD-10-CM

## 2023-04-04 PROCEDURE — 97022 WHIRLPOOL THERAPY: CPT | Mod: PN

## 2023-04-04 PROCEDURE — 97110 THERAPEUTIC EXERCISES: CPT | Mod: PN

## 2023-04-04 PROCEDURE — 97140 MANUAL THERAPY 1/> REGIONS: CPT | Mod: PN

## 2023-04-04 NOTE — PROGRESS NOTES
"        [facility] Occupational Therapy Daily Treatment Note   Date: 4/4/2023  Name: Aleksandar Knight Sr.  Clinic Number: [id number]  Therapy Diagnosis: right hand pain  Physician: Gregor Boucher MD  Physician Orders: eval and treat carpal tunnel protocol  Medical Diagnosis: Guyon's canal release  Surgical Procedure and Date: Carpal tunnel and Guyon's canal release on 3/01/23  Evaluation Date: 3/15/2023  Insurance Authorization Period Expiration: 4/12/2023  Plan of Care Certification Period: 5/30/2023  Date of Return to MD: 3/30/23  Visit # / Visits authorized: 5/12 including eval  Time In:7:30  Time Out: 8:13  Total Billable Time: 43 minutes    Precautions:   Standard  post op    Subjective   Pt reports: Still can't tolerate putting pressure under the little finger and into it. Did use my hand a lot this past weekend and didn't get those sharp pains like it did before when you used it.  said everything looks okay. Will see him in 6 weeks to discuss doing the surgery on the scar and little finger.    was compliant with home exercise program given    Response to previous treatment:better  Functional change:    Pain: end of session  2/10 "used to hurt a lot more when therapy was over"  Location: right hand      Objective   Patient received the following treatment:   Supervised modalities after being cleared for contradictions for 10 minutes including:      Fluidotherapy to right hand for 10 minutes to increase tissue elasticity, desensitization, sensory re-education and for pain management. Air flow 80 heat 112 degrees with Active range of motion in it    Manual therapy techniques to increase joint mobilization and soft tissue mobilization for 10 minutes including:    -Scar massage to right hand for 5 minutes  to decrease dense adhesions and improve tensile glide.   -Soft tissue mobilization to  right hand area for 5 minutes  to increase tissue elasticity and decrease pain  Therapeutic exercises to " improve functional performance while increasing strength, endurance, ROM,  and flexibility for 23 minutes, including:   -Active assistive/Passive range of motion wrist flexion, extension, deviation 10 repetitions each  -Active assistive/Passive range of motion of fingers each finger then composite and place and hold in a fist  -Passive stretch of little and ring into extension holding 10 seconds x 5 repetitions   -Wrist free weight 3#  25 repetitions each flexion, extension and deviation  --GREEN theraputty gripping and pulling with ulnar aspect of the hand  -Gripper  50# (increased by 15#) 2 x 10 repetitions 35# 10 repetitions   Instruction and demonstration given of all introduced   Patient required moderate cuing for correct performance of exercise.    Home Exercises and Education Provided   Education provided:  - discussed insurance limitation  - Progress towards goals     Written Home Exercises Provided: Patient instructed to cont prior HEP.  Exercises were reviewed and he was able to demonstrate them prior to the end of the session. he demonstrated good  understanding of the HEP provided.   See EMR under Patient Instructions for exercises provided prior visit.     Assessment   Overall pain has decreased. Patient noting no longer with electrical shocking pain at periphery of base of hand.  He noted increase in use with less adverse pain post use. Scar at ulnar aspect of hand and into little finger still painful hindering ability to  with pressure due to pain at palm.  He is able to touch palm with all fingers except long finger with fisting.  Pt  will continue to benefit from skilled OT to further address pain and deficits in ROM and strength which are hindering ability to perform self care and IADLs. Patient's cultural,spiritual, and educational needs were considered    Goals:  Short Term Goals  Independent in home program of ROM in 2 visits-met  Patient with full use of right hand for all self care in 2  weeks-met  Patient able to open screw top containers with no difficulty in 3 weeks-ongoing  Patient with decrease in pain to 1/10 at surgical site and full wrist ROM in 3 weeks-ongoing  Long Term Goals  Patient with no pain at surgery site and increase in fingers flexion to full fist in 5 weeks-ongoing  Patient able to grasp and stabilize objects with right in 6 weeks-ongoing  Patient able to carry and lift up to 8# with the right hand in 6 weeks-ongoing  Improve FOTO score by 12 points indicating improved function of right hand in 6 weeks  Plan   Continue skilled therapy 2 times a week for 3 weeks then may decrease to 1 time a week for 2 weeks including therapeutic exercises and activities, manual therapy, and pain modalities  as needed   next session: AKBAR Faulkner LOTR

## 2023-04-05 ENCOUNTER — CLINICAL SUPPORT (OUTPATIENT)
Dept: REHABILITATION | Facility: HOSPITAL | Age: 68
End: 2023-04-05
Payer: MEDICARE

## 2023-04-05 PROCEDURE — 97530 THERAPEUTIC ACTIVITIES: CPT | Mod: PN

## 2023-04-05 PROCEDURE — 97022 WHIRLPOOL THERAPY: CPT | Mod: PN

## 2023-04-05 PROCEDURE — 97110 THERAPEUTIC EXERCISES: CPT | Mod: PN

## 2023-04-17 ENCOUNTER — TELEPHONE (OUTPATIENT)
Dept: FAMILY MEDICINE | Facility: CLINIC | Age: 68
End: 2023-04-17

## 2023-04-17 DIAGNOSIS — E78.2 MIXED HYPERLIPIDEMIA: Primary | ICD-10-CM

## 2023-04-17 DIAGNOSIS — Z12.5 SCREENING FOR PROSTATE CANCER: ICD-10-CM

## 2023-04-17 DIAGNOSIS — I10 ESSENTIAL HYPERTENSION, BENIGN: ICD-10-CM

## 2023-04-17 DIAGNOSIS — N18.31 STAGE 3A CHRONIC KIDNEY DISEASE: ICD-10-CM

## 2023-04-17 DIAGNOSIS — I10 ESSENTIAL HYPERTENSION: ICD-10-CM

## 2023-04-17 NOTE — TELEPHONE ENCOUNTER
----- Message from Jackie Jackson sent at 4/17/2023  8:44 AM CDT -----  Pt would like to know should he have labs before his upcoming appt in [phone]

## 2023-04-17 NOTE — TELEPHONE ENCOUNTER
Spoke with patient informed of fasting lab work and urine prior to appointment. Patient verbalized understanding.

## 2023-04-18 ENCOUNTER — CLINICAL SUPPORT (OUTPATIENT)
Dept: REHABILITATION | Facility: HOSPITAL | Age: 68
End: 2023-04-18
Payer: MEDICARE

## 2023-04-18 DIAGNOSIS — Z98.890 HX OF HAND SURGERY: Primary | ICD-10-CM

## 2023-04-18 PROCEDURE — 97022 WHIRLPOOL THERAPY: CPT | Mod: PN

## 2023-04-18 PROCEDURE — 97110 THERAPEUTIC EXERCISES: CPT | Mod: PN

## 2023-04-18 PROCEDURE — 97530 THERAPEUTIC ACTIVITIES: CPT | Mod: PN

## 2023-04-18 NOTE — PROGRESS NOTES
[facility]   Date: 4/18/2023  Name: Aleksandar Knight Sr.  Clinic Number: [id number]  Therapy Diagnosis: right hand pain  Physician: Gregor Boucher MD  Physician Orders: eval and treat carpal tunnel protocol  Medical Diagnosis: Guyon's canal release  Surgical Procedure and Date: Carpal tunnel and Guyon's canal release on 3/01/23  Evaluation Date: 3/15/2023  Insurance Authorization Period Expiration: 5/13/2023  Plan of Care Certification Period: 5/30/2023  Date of Return to MD: 5/11/23  Visit # / Visits authorized: 7/17 including eval  Time In:7:13  Time Out: 8:15  Total Billable Time: 58 minutes    Precautions:   Standard  post op      Subjective   Pt reports: Doing pretty good. Sometimes wake up in morning fingers are stiff. Still very sensitive on that little finger.   Burning noted occasionally at CT surgical site.    was compliant with home exercise program given    Response to previous treatment:as above  Functional change: able to  a gallon of tea  Pain: end of session  2/10   Location: right hand      Objective   Patient received the following treatment:   Supervised modalities after being cleared for contradictions for 10 minutes including:      Fluidotherapy to right hand for 10 minutes to increase tissue elasticity, desensitization, sensory re-education and for pain management. Air flow 80 heat 112 degrees with Active range of motion in it    Manual therapy techniques to increase joint mobilization and soft tissue mobilization for 6 minutes including:    -Scar massage to right hand for 3 minutes  to decrease dense adhesions and improve tensile glide.   -Soft tissue mobilization to  right hand area for 3 minutes  to increase tissue elasticity and decrease pain  Therapeutic exercises to improve functional performance while increasing strength, endurance, ROM,  and flexibility for 19 minutes, including:   -Active assistive/Passive range  "of motion wrist extension, deviation 10 repetitions each  -Active assistive/Passive range of motion of fingers each finger then composite and place and hold in a fist  -Passive stretch of little and ring into extension holding 10 seconds x 5 repetitions   -Wrist free weight 3#  25 repetitions each flexion, extension and deviation  --GREEN theraputty gripping and pulling with ulnar aspect of the hand  -Gripper  50#  2 x 10 repetitions 35# 10 repetitions  Therapeutic activities to improve functional performance with use of dynamic activities for 23 minutes  including:  -"cookie cutting" with 1" cardboard dowel of GREEN theraputty  with use of gross    -Flexbar RED simulated towel wringing x 15 repetitions  --screw board placing and removing 8 screws with gross  on screw    Instruction and demonstration given of all introduced   Patient required moderate cuing for correct performance of exercise.    Home Exercises and Education Provided   Education provided:  - discussed insurance limitation  - Progress towards goals     Written Home Exercises Provided: Patient instructed to cont prior HEP.  Exercises were reviewed and he was able to demonstrate them prior to the end of the session. he demonstrated good  understanding of the HEP provided.   See EMR under Patient Instructions for exercises provided prior visit.     Assessment   Patient noting pain at little finger only. Continues with sharp pain at little MP into proximal phalanx with any lifting or something resting on that area. His  is improving, now able to touch palm with all fingers and increase strength as now able to lift a gallon of tea. He rested hand a few times during screw board exercise due to cramping at little finger. He is unable to fully squeeze gripper when set at 50#. Pt  will continue to benefit from skilled OT to further address pain and deficits in ROM and strength which are hindering ability to perform self care and IADLs. " Patient's cultural,spiritual, and educational needs were considered    Goals:  Short Term Goals  Independent in home program of ROM in 2 visits-met  Patient with full use of right hand for all self care in 2 weeks-met  Patient able to open screw top containers with no difficulty in 3 weeks-ongoing  Patient with decrease in pain to 1/10 at surgical site and full wrist ROM in 3 weeks-ongoing  Long Term Goals  Patient with no pain at surgery site and increase in fingers flexion to full fist in 5 weeks-ongoing  Patient able to grasp and stabilize objects with right in 6 weeks-ongoing  Patient able to carry and lift up to 8# with the right hand in 6 weeks-ongoing  Improve FOTO score by 12 points indicating improved function of right hand in 6 weeks  Plan   Continue skilled therapy 2 times a week for 3 weeks then may decrease to 1 time a week for 2 weeks including therapeutic exercises and activities, manual therapy, and pain modalities  as needed   next session:  NELY Garner

## 2023-04-20 ENCOUNTER — CLINICAL SUPPORT (OUTPATIENT)
Dept: REHABILITATION | Facility: HOSPITAL | Age: 68
End: 2023-04-20
Payer: MEDICARE

## 2023-04-20 DIAGNOSIS — Z98.890 HX OF HAND SURGERY: Primary | ICD-10-CM

## 2023-04-20 PROCEDURE — 97140 MANUAL THERAPY 1/> REGIONS: CPT | Mod: PN

## 2023-04-20 PROCEDURE — 97022 WHIRLPOOL THERAPY: CPT | Mod: PN

## 2023-04-20 PROCEDURE — 97110 THERAPEUTIC EXERCISES: CPT | Mod: PN

## 2023-04-20 NOTE — PROGRESS NOTES
[facility] Occupational Therapy Daily Treatment Note   Date: 4/20/2023  Name: Aleksandar Knight Sr.  Clinic Number: [id number]  Therapy Diagnosis: right hand pain  Physician: Gregor Boucher MD  Physician Orders: eval and treat carpal tunnel protocol  Medical Diagnosis: Guyon's canal release  Surgical Procedure and Date: Carpal tunnel and Guyon's canal release on 3/01/23  Evaluation Date: 3/15/2023  Insurance Authorization Period Expiration: 5/13/2023  Plan of Care Certification Period: 5/30/2023  Date of Return to MD: 5/11/23  Visit # / Visits authorized: 8/17 including eval  Time In:6:45  Time Out: 7:30  Total Billable Time:45 minutes    Precautions:   Standard  post op    FOTO  3/15/23  41%     4/20/23  50%  Subjective   Pt reports: Was doing some heavy lifting and got those sharp pains at night. They weren't as bad as they used to be.    was compliant with home exercise program given    Response to previous treatment:as above  Functional change: able to  a gallon of tea  Pain: end of session  2/10   Location: right hand      Objective   Patient received the following treatment:   Supervised modalities after being cleared for contradictions for 10 minutes including:      Fluidotherapy to right hand for 10 minutes to increase tissue elasticity, desensitization, sensory re-education and for pain management. Air flow 80 heat 112 degrees with Active range of motion in it    Manual therapy techniques to increase joint mobilization and soft tissue mobilization for 8 minutes including:    -Scar massage to right hand for 5 minutes  to decrease dense adhesions and improve tensile glide.   -Soft tissue mobilization to  right hand area for 3 minutes  to increase tissue elasticity and decrease pain  Therapeutic exercises to improve functional performance while increasing strength, endurance, ROM,  and flexibility for 17 minutes, including:   -Active assistive/Passive range of motion wrist  "extension, deviation 10 repetitions each  -Active assistive/Passive range of motion of fingers each finger then composite and place and hold in a fist  -Passive stretch of little and ring into extension holding 10 seconds x 5 repetitions   -Wrist free weight 3#  25 repetitions each flexion, extension and deviation  --GREEN theraputty gripping and pulling with ulnar aspect of the hand  -Gripper  rubberband   1 thick red and 2 yellow rubberbands 30 repetitions   Therapeutic activities to improve functional performance with use of dynamic activities for 10 minutes  including:  -GREEN putty depressing into it with 1" and 1/2" dowel with gross  with ulnar aspect of hand   -Flexbar RED simulated towel wringing x 15 repetitions  Instruction and demonstration given of all introduced   Patient required moderate cuing for correct performance of exercise.    Home Exercises and Education Provided   Education provided:  - discussed insurance limitation  - Progress towards goals     Written Home Exercises Provided: Patient instructed to cont prior HEP.  Exercises were reviewed and he was able to demonstrate them prior to the end of the session. he demonstrated good  understanding of the HEP provided.   See EMR under Patient Instructions for exercises provided prior visit.     Assessment   Patient able to touch palm post stretches. He noted sharp pain post use for heavy lifting, however, not as intense as prior to last surgery. He was able to perform gross  on dowels with increase effort. He continues to not to be able to tolerate pressure at proximal phalanx of little finger due to nerve pain hindering ability to . Pt  will continue to benefit from skilled OT to further address pain and deficits in ROM and strength which are hindering ability to perform self care and IADLs. Patient's cultural,spiritual, and educational needs were considered    Goals:  Short Term Goals  Independent in home program of ROM in 2 " visits-met  Patient with full use of right hand for all self care in 2 weeks-met  Patient able to open screw top containers with no difficulty in 3 weeks-ongoing  Patient with decrease in pain to 1/10 at surgical site and full wrist ROM in 3 weeks-ongoing  Long Term Goals  Patient with no pain at surgery site and increase in fingers flexion to full fist in 5 weeks-ongoing  Patient able to grasp and stabilize objects with right in 6 weeks-ongoing  Patient able to carry and lift up to 8# with the right hand in 6 weeks-ongoing  Improve FOTO score by 12 points indicating improved function of right hand in 6 weeks  Plan   Continue skilled therapy 2 times a week for 3 weeks then may decrease to 1 time a week for 2 weeks including therapeutic exercises and activities, manual therapy, and pain modalities  as needed   next session:      NELY Hines

## 2023-04-25 ENCOUNTER — CLINICAL SUPPORT (OUTPATIENT)
Dept: REHABILITATION | Facility: HOSPITAL | Age: 68
End: 2023-04-25
Payer: MEDICARE

## 2023-04-25 DIAGNOSIS — Z98.890 HX OF HAND SURGERY: Primary | ICD-10-CM

## 2023-04-25 PROCEDURE — 97140 MANUAL THERAPY 1/> REGIONS: CPT | Mod: PN

## 2023-04-25 PROCEDURE — 97110 THERAPEUTIC EXERCISES: CPT | Mod: PN

## 2023-04-25 PROCEDURE — 97022 WHIRLPOOL THERAPY: CPT | Mod: PN

## 2023-04-25 NOTE — PROGRESS NOTES
[facility]   Date: 4/25/2023  Name: Aleksandar Knight Sr.  Clinic Number: [id number]  Therapy Diagnosis: right hand pain  Physician: Gregor Boucher MD  Physician Orders: eval and treat carpal tunnel protocol  Medical Diagnosis: Guyon's canal release  Surgical Procedure and Date: Carpal tunnel and Guyon's canal release on 3/01/23  Evaluation Date: 3/15/2023  Insurance Authorization Period Expiration: 5/13/2023  Plan of Care Certification Period: 5/30/2023  Date of Return to MD: 5/11/23  Visit # / Visits authorized: 9/17 including eval  Time In:6:45  Time Out: 7:30  Total Billable Time:45 minutes    Precautions:   Standard  post op    FOTO  3/15/23  41%     4/20/23  50%  Subjective   Pt reports: Getting those pains every night now in my hand but not as bad as before surgery. Have a constant burning pain across here (at volar wrist crease).  Went to pickup my mouth wash this morning, hit that spot on my little finger and the bottle hit the floor.    was compliant with home exercise program given    Response to previous treatment:as above  Functional change: cutting grass, changing oil in car  Pain: end of session  2/10   Location: right hand      Objective   Patient received the following treatment:   Supervised modalities after being cleared for contradictions for 10 minutes including:      Fluidotherapy to right hand for 10 minutes to increase tissue elasticity, desensitization, sensory re-education and for pain management. Air flow 80 heat 112 degrees with Active range of motion in it    Manual therapy techniques to increase joint mobilization and soft tissue mobilization for 10 minutes including:    -Scar massage to right hand for 7 minutes  to decrease dense adhesions and improve tensile glide.   -Soft tissue mobilization to  right hand area for 3 minutes  to increase tissue elasticity and decrease pain  Therapeutic exercises to improve functional  performance while increasing strength, endurance, ROM,  and flexibility for 28 minutes, including:   -Active assistive/Passive range of motion wrist extension, deviation 10 repetitions each  -Active assistive/Passive range of motion of fingers each finger then composite and place and hold in a fist  -Passive stretch of little and ring into extension holding 10 seconds x 5 repetitions   -Wrist free weight 3#  25 repetitions each flexion, extension and deviation  --Gripper  rubberband   1 thick red and 2 yellow rubberbands 30 repetitions   ADDED:-Power web GREEN grasping and twisting clockwise and counter clockwise then pushing into web with attempting to flatten hand and stretch into wrist extension 20 repetitions each  Instruction and demonstration given of all introduced   Patient required moderate cuing for correct performance of exercise.    Home Exercises and Education Provided   Education provided:  - discussed insurance limitation  - Progress towards goals     Written Home Exercises Provided: Patient instructed to cont prior HEP.  Exercises were reviewed and he was able to demonstrate them prior to the end of the session. he demonstrated good  understanding of the HEP provided.   See EMR under Patient Instructions for exercises provided prior visit.     Assessment   Patient's grasp continues to be hindered by pain at little finger, has to be cautious on how pick things up. He continues with nightly pains in the hand but of much less intensity as prior to last surgery. He is experiencing constant burning pain along volar wrist crease which he said was relieved with soft tissue mobilization. Pain noted at little finger and volar radial wrist with stretches into extension with Power web.  Pt  will continue to benefit from skilled OT to further address pain and deficits in ROM and strength which are hindering ability to perform self care and IADLs. Patient's cultural,spiritual, and educational needs were  considered    Goals:  Short Term Goals  Independent in home program of ROM in 2 visits-met  Patient with full use of right hand for all self care in 2 weeks-met  Patient able to open screw top containers with no difficulty in 3 weeks-ongoing  Patient with decrease in pain to 1/10 at surgical site and full wrist ROM in 3 weeks-ongoing  Long Term Goals  Patient with no pain at surgery site and increase in fingers flexion to full fist in 5 weeks-ongoing  Patient able to grasp and stabilize objects with right in 6 weeks-ongoing  Patient able to carry and lift up to 8# with the right hand in 6 weeks-ongoing  Improve FOTO score by 12 points indicating improved function of right hand in 6 weeks  Plan   Continue skilled therapy 2 times a week for 3 weeks then may decrease to 1 time a week for 2 weeks including therapeutic exercises and activities, manual therapy, and pain modalities  as needed   next session:      NELY Hines

## 2023-04-27 ENCOUNTER — CLINICAL SUPPORT (OUTPATIENT)
Dept: REHABILITATION | Facility: HOSPITAL | Age: 68
End: 2023-04-27
Payer: MEDICARE

## 2023-04-27 DIAGNOSIS — Z98.890 HX OF HAND SURGERY: Primary | ICD-10-CM

## 2023-04-27 PROCEDURE — 97140 MANUAL THERAPY 1/> REGIONS: CPT | Mod: PN

## 2023-04-27 PROCEDURE — 97110 THERAPEUTIC EXERCISES: CPT | Mod: PN

## 2023-04-27 PROCEDURE — 97022 WHIRLPOOL THERAPY: CPT | Mod: PN

## 2023-04-27 NOTE — PROGRESS NOTES
[facility] Occupational Therapy Daily Treatment Note   Date: 4/27/2023  Name: Aleksandar Knight Sr.  Clinic Number: [id number]  Therapy Diagnosis: right hand pain  Physician: Gregor Boucher MD  Physician Orders: eval and treat carpal tunnel protocol  Medical Diagnosis: Guyon's canal release  Surgical Procedure and Date: Carpal tunnel and Guyon's canal release on 3/01/23  Evaluation Date: 3/15/2023  Insurance Authorization Period Expiration: 5/13/2023  Plan of Care Certification Period: 5/30/2023  Date of Return to MD: 5/11/23  Visit # / Visits authorized: 10/17 including eval  Time In:6:45  Time Out: 7:30  Total Billable Time:45 minutes    Precautions:   Standard  post op    FOTO  3/15/23  41%     4/20/23  50%  Subjective   Pt reports:  Night pain still there, started about 2 weeks ago and burning across the wrist, started about 3 weeks after surgery, is there 24/7. Can live with the way it is.   was compliant with home exercise program given    Response to previous treatment:as above  Functional change: cutting grass, changing oil in car  Pain: end of session  2/10   Location: right hand      Objective    Hand -  Strength  Carl dynamometer 2nd Rung in lbs;  Pinches:pinch gauge average of 2 trials in psi    4/27/23 3/27/23   Position Right Right   Hand : Trial 1 40 35   Hand : Trial 2 40 31   Hand : Trial 3 40 31    Average 40 32   Pinch Lateral 17 18.5   3 jaw chantelle 13 10.5   Tip 12 11     Patient received the following treatment:   Supervised modalities after being cleared for contradictions for 10 minutes including:      Fluidotherapy to right hand for 10 minutes to increase tissue elasticity, desensitization, sensory re-education and for pain management. Air flow 80 heat 112 degrees with Active range of motion in it    Manual therapy techniques to increase joint mobilization and soft tissue mobilization for 10 minutes including:    -Scar massage to right hand for 7  minutes  to decrease dense adhesions and improve tensile glide.   -Soft tissue mobilization to  right hand area for 3 minutes  to increase tissue elasticity and decrease pain  Therapeutic exercises to improve functional performance while increasing strength, endurance, ROM,  and flexibility for 28 minutes, including:   -Active assistive/Passive range of motion wrist extension, deviation 10 repetitions each  -Active assistive/Passive range of motion of fingers each finger then composite and place and hold in a fist  -Passive stretch of little and ring into extension holding 10 seconds x 5 repetitions   -Wrist free weight 3#  25 repetitions each flexion, extension and deviation  --Gripper  rubberband   1 thick red and 2 yellow rubberbands 30 repetitions   -Power web GREEN grasping and twisting clockwise and counter clockwise then pushing into web with attempting to flatten hand and stretch into wrist extension 20 repetitions each    Instruction and demonstration given of all introduced   Patient required moderate cuing for correct performance of exercise.    Home Exercises and Education Provided   Education provided:  - discussed insurance limitation  - Progress towards goals     Written Home Exercises Provided: Patient instructed to cont prior HEP.  Exercises were reviewed and he was able to demonstrate them prior to the end of the session. he demonstrated good  understanding of the HEP provided.   See EMR under Patient Instructions for exercises provided prior visit.     Assessment   Patient continues with pain every night. Burning at volar wrist crease is there all the time, increases with activities.  Increase of 8# in  strength and increase of 1-2.5# in pinch strengths.  Right  is 65% of the left. He continues with limitations due to pain and weakness. Pt  will continue to benefit from skilled OT to further address pain and deficits in ROM and strength which are hindering ability to perform self care and  IADLs. Patient's cultural, spiritual, and educational needs were considered    Goals:  Short Term Goals  Independent in home program of ROM in 2 visits-met  Patient with full use of right hand for all self care in 2 weeks-met  Patient able to open screw top containers with no difficulty in 3 weeks-ongoing  Patient with decrease in pain to 1/10 at surgical site and full wrist ROM in 3 weeks-ongoing  Long Term Goals  Patient with no pain at surgery site and increase in fingers flexion to full fist in 5 weeks-ongoing  Patient able to grasp and stabilize objects with right in 6 weeks-ongoing  Patient able to carry and lift up to 8# with the right hand in 6 weeks-ongoing  Improve FOTO score by 12 points indicating improved function of right hand in 6 weeks-ongoing  Plan   Continue skilled therapy 2 times a week for 2 weeks then may decrease to 1 time a week for 2 weeks including therapeutic exercises and activities, manual therapy, and pain modalities  as needed   next session:  NELY Qunitero

## 2023-05-02 ENCOUNTER — CLINICAL SUPPORT (OUTPATIENT)
Dept: REHABILITATION | Facility: HOSPITAL | Age: 68
End: 2023-05-02
Payer: MEDICARE

## 2023-05-02 DIAGNOSIS — Z98.890 HX OF HAND SURGERY: Primary | ICD-10-CM

## 2023-05-02 LAB
ALBUMIN SERPL-MCNC: 4.6 G/DL (ref 3.6–5.1)
ALBUMIN/GLOB SERPL: 1.8 (CALC) (ref 1–2.5)
ALP SERPL-CCNC: 57 U/L (ref 35–144)
ALT SERPL-CCNC: 20 U/L (ref 9–46)
APPEARANCE UR: CLEAR
AST SERPL-CCNC: 18 U/L (ref 10–35)
BACTERIA #/AREA URNS HPF: NORMAL /HPF
BACTERIA UR CULT: NORMAL
BASOPHILS # BLD AUTO: 28 CELLS/UL (ref 0–200)
BASOPHILS NFR BLD AUTO: 0.5 %
BILIRUB SERPL-MCNC: 0.4 MG/DL (ref 0.2–1.2)
BILIRUB UR QL STRIP: NEGATIVE
BUN SERPL-MCNC: 20 MG/DL (ref 7–25)
BUN/CREAT SERPL: ABNORMAL (CALC) (ref 6–22)
CALCIUM SERPL-MCNC: 9.5 MG/DL (ref 8.6–10.3)
CHLORIDE SERPL-SCNC: 106 MMOL/L (ref 98–110)
CHOLEST SERPL-MCNC: 150 MG/DL
CHOLEST/HDLC SERPL: 3.7 (CALC)
CO2 SERPL-SCNC: 29 MMOL/L (ref 20–32)
COLOR UR: YELLOW
CREAT SERPL-MCNC: 1.22 MG/DL (ref 0.7–1.35)
EGFR: 64 ML/MIN/1.73M2
EOSINOPHIL # BLD AUTO: 160 CELLS/UL (ref 15–500)
EOSINOPHIL NFR BLD AUTO: 2.9 %
ERYTHROCYTE [DISTWIDTH] IN BLOOD BY AUTOMATED COUNT: 12.9 % (ref 11–15)
GLOBULIN SER CALC-MCNC: 2.6 G/DL (CALC) (ref 1.9–3.7)
GLUCOSE SERPL-MCNC: 101 MG/DL (ref 65–99)
GLUCOSE UR QL STRIP: NEGATIVE
HCT VFR BLD AUTO: 40 % (ref 38.5–50)
HDLC SERPL-MCNC: 41 MG/DL
HGB BLD-MCNC: 12.7 G/DL (ref 13.2–17.1)
HGB UR QL STRIP: NEGATIVE
HYALINE CASTS #/AREA URNS LPF: NORMAL /LPF
KETONES UR QL STRIP: NEGATIVE
LDLC SERPL CALC-MCNC: 88 MG/DL (CALC)
LEUKOCYTE ESTERASE UR QL STRIP: NEGATIVE
LYMPHOCYTES # BLD AUTO: 1727 CELLS/UL (ref 850–3900)
LYMPHOCYTES NFR BLD AUTO: 31.4 %
MCH RBC QN AUTO: 28.6 PG (ref 27–33)
MCHC RBC AUTO-ENTMCNC: 31.8 G/DL (ref 32–36)
MCV RBC AUTO: 90.1 FL (ref 80–100)
MONOCYTES # BLD AUTO: 534 CELLS/UL (ref 200–950)
MONOCYTES NFR BLD AUTO: 9.7 %
NEUTROPHILS # BLD AUTO: 3053 CELLS/UL (ref 1500–7800)
NEUTROPHILS NFR BLD AUTO: 55.5 %
NITRITE UR QL STRIP: NEGATIVE
NONHDLC SERPL-MCNC: 109 MG/DL (CALC)
PH UR STRIP: 6.5 [PH] (ref 5–8)
PLATELET # BLD AUTO: 226 THOUSAND/UL (ref 140–400)
PMV BLD REES-ECKER: 10.2 FL (ref 7.5–12.5)
POTASSIUM SERPL-SCNC: 4.9 MMOL/L (ref 3.5–5.3)
PROT SERPL-MCNC: 7.2 G/DL (ref 6.1–8.1)
PROT UR QL STRIP: NEGATIVE
PSA SERPL-MCNC: 1.04 NG/ML
RBC # BLD AUTO: 4.44 MILLION/UL (ref 4.2–5.8)
RBC #/AREA URNS HPF: NORMAL /HPF
SERVICE CMNT-IMP: NORMAL
SODIUM SERPL-SCNC: 143 MMOL/L (ref 135–146)
SP GR UR STRIP: 1.02 (ref 1–1.03)
SQUAMOUS #/AREA URNS HPF: NORMAL /HPF
TRIGL SERPL-MCNC: 115 MG/DL
TSH SERPL-ACNC: 2.32 MIU/L (ref 0.4–4.5)
WBC # BLD AUTO: 5.5 THOUSAND/UL (ref 3.8–10.8)
WBC #/AREA URNS HPF: NORMAL /HPF

## 2023-05-02 PROCEDURE — 97110 THERAPEUTIC EXERCISES: CPT | Mod: PN

## 2023-05-02 PROCEDURE — 97022 WHIRLPOOL THERAPY: CPT | Mod: PN

## 2023-05-02 PROCEDURE — 97530 THERAPEUTIC ACTIVITIES: CPT | Mod: KX,PN

## 2023-05-04 ENCOUNTER — CLINICAL SUPPORT (OUTPATIENT)
Dept: REHABILITATION | Facility: HOSPITAL | Age: 68
End: 2023-05-04
Payer: MEDICARE

## 2023-05-04 DIAGNOSIS — Z98.890 HX OF HAND SURGERY: Primary | ICD-10-CM

## 2023-05-04 PROCEDURE — 97110 THERAPEUTIC EXERCISES: CPT | Mod: PN

## 2023-05-04 PROCEDURE — 97022 WHIRLPOOL THERAPY: CPT | Mod: KX,PN

## 2023-05-04 PROCEDURE — 97530 THERAPEUTIC ACTIVITIES: CPT | Mod: KX,PN

## 2023-05-04 NOTE — PROGRESS NOTES
[facility]   Date: 5/4/2023  Name: Aleksandar Knihgt Sr.  Clinic Number: [id number]  Therapy Diagnosis: right hand pain  Physician: Gregor Boucher MD  Physician Orders: eval and treat carpal tunnel protocol  Medical Diagnosis: Guyon's canal release  Surgical Procedure and Date: Carpal tunnel and Guyon's canal release on 3/01/23  Evaluation Date: 3/15/2023  Insurance Authorization Period Expiration: 5/13/2023  Plan of Care Certification Period: 5/30/2023  Date of Return to MD: 5/11/23  Visit # / Visits authorized: 12/17 including eval  Time In:6:45  Time Out: 7:30  Total Billable Time:45 minutes    Precautions:   Standard  post op    FOTO  3/15/23  41%     4/20/23  50%  Subjective   Pt reports:  Had 2 pains on Tues night then none last night.  Burning at the wrist remains constant 3/10 pain.    was compliant with home exercise program given    Response to previous treatment:as above  Functional change: cutting grass, changing oil in car  Pain: end of session  2/10   Location: right hand      Objective   Patient received the following treatment:   Supervised modalities after being cleared for contradictions for 10 minutes including:      Fluidotherapy to right hand for 10 minutes to increase tissue elasticity, desensitization, sensory re-education and for pain management. Air flow 80 heat 112 degrees with Active range of motion in it    Manual therapy techniques to increase joint mobilization and soft tissue mobilization for 6 minutes including:    -Scar massage to right hand for 6 minutes  to decrease dense adhesions and improve tensile glide.   Therapeutic exercises to improve functional performance while increasing strength, endurance, ROM,  and flexibility for 21 minutes, including:   -Active assistive/Passive range of motion wrist extension, deviation 10 repetitions each  -Active assistive/Passive range of motion of fingers each finger then  composite and place and hold in a fist  -Passive stretch of little and ring into extension holding 10 seconds x 5 repetitions   -Gripper  rubberband   1 thick red and 2 yellow rubberbands 30 repetitions   -Power web GREEN grasping and twisting clockwise and counter clockwise then pushing into web with attempting to flatten hand and stretch into wrist extension 20 repetitions each  Therapeutic activities to improve functional performance with use of dynamic activities for 8 minutes  including:  -GREEN putty gripping and pulling with ulnar aspect of the hand and simulated jar open/close  -Flexbar GREEN (increased by 1 level) simulated towel wringing  and jar opening with ulnar side of hand x 20 repetitions each  Instruction and demonstration given of all introduced   Patient required moderate cuing for correct performance of exercise.    Home Exercises and Education Provided   Education provided:  - discussed insurance limitation  - Progress towards goals     Written Home Exercises Provided: Patient instructed to cont prior HEP.  Exercises were reviewed and he was able to demonstrate them prior to the end of the session. he demonstrated good  understanding of the HEP provided.   See EMR under Patient Instructions for exercises provided prior visit.     Assessment   Patient noting a decrease in night pain, however, hadn't done any heavy work in the past 2 days. During flexbar exercise hand began shaking and noted shocking pain at little finger. Power web increase pain at little finger with twisting clockwise.  Pt  will continue to benefit from skilled OT to further address pain and deficits in ROM and strength which are hindering ability to perform self care and IADLs. Patient's cultural, spiritual, and educational needs were considered.    Goals:  Short Term Goals  Independent in home program of ROM in 2 visits-met  Patient with full use of right hand for all self care in 2 weeks-met  Patient able to open screw top  containers with no difficulty in 3 weeks-ongoing  Patient with decrease in pain to 1/10 at surgical site and full wrist ROM in 3 weeks-ongoing  Long Term Goals  Patient with no pain at surgery site and increase in fingers flexion to full fist in 5 weeks-ongoing  Patient able to grasp and stabilize objects with right in 6 weeks-ongoing  Patient able to carry and lift up to 8# with the right hand in 6 weeks-ongoing  Improve FOTO score by 12 points indicating improved function of right hand in 6 weeks-ongoing  Plan   Continue skilled therapy 2 times a week for 2 weeks then may decrease to 1 time a week for 2 weeks including therapeutic exercises and activities, manual therapy, and pain modalities  as needed   Next session: reassess for MD appt on 5/11  NELY Hines

## 2023-05-09 ENCOUNTER — CLINICAL SUPPORT (OUTPATIENT)
Dept: REHABILITATION | Facility: HOSPITAL | Age: 68
End: 2023-05-09
Attending: SURGERY
Payer: MEDICARE

## 2023-05-09 ENCOUNTER — DOCUMENTATION ONLY (OUTPATIENT)
Dept: REHABILITATION | Facility: HOSPITAL | Age: 68
End: 2023-05-09

## 2023-05-09 DIAGNOSIS — Z98.890 HX OF HAND SURGERY: Primary | ICD-10-CM

## 2023-05-09 PROCEDURE — 97530 THERAPEUTIC ACTIVITIES: CPT | Mod: KX,PN

## 2023-05-09 PROCEDURE — 97022 WHIRLPOOL THERAPY: CPT | Mod: KX,PN

## 2023-05-09 PROCEDURE — 97110 THERAPEUTIC EXERCISES: CPT | Mod: KX,PN

## 2023-05-09 NOTE — PROGRESS NOTES
[facility] Daily Treatment/Progress Note   Date: 5/9/2023  Name: Aleksandar Knight Sr.  Clinic Number: [id number]  Therapy Diagnosis: right hand pain  Physician: Gregor Boucher MD  Physician Orders: eval and treat carpal tunnel protocol  Medical Diagnosis: Guyon's canal release  Surgical Procedure and Date: Carpal tunnel and Guyon's canal release on 3/01/23  Evaluation Date: 3/15/2023  Insurance Authorization Period Expiration: 5/13/2023  Plan of Care Certification Period: 5/30/2023  Date of Return to MD: 5/11/23  Visit # / Visits authorized: 12/17 including eval  Time In:6:45  Time Out: 7:30  Total Billable Time:45 minutes    Precautions:   Standard  post op    FOTO  3/15/23  41%     4/20/23  50%  Subjective   Pt reports: Constant burning at wrist crease of  3/10 pain up to 7/10 if use hand a lot and changes to electrical impulses. Persisting electrical pain in the little finger with pressure at base of little finger. Hand still shakes whenever have to put pressure with the hand. Get shocking pain at night whenever use hand a lot during the day and will last for a day or so.    was compliant with home exercise program given    Response to previous treatment: unremarkable  Functional change: cutting grass, changing oil in car  Pain: end of session  2/10   Location: right hand      Objective   Wrist -  Range Of Motion    5/09/23 3/29                        3/15/2023                  Motion A/PROM    LEFT  A/PROM  Left A/PROM Left   Flexion 45/NT 30 30/45   Extension 35/45 35/50 30/50   Ulnar Deviation 20/NT 20 20/30   Radial Deviation 20 NT 20 20/NT      HAND AROM   Extension-Flexion     RIGHT                                                                 3/29/2023         INDEX   LONG    RING    LITTLE                          MCP   0-75      0-65       10-75    0-70                 PIP     0-95      0-90       10-90    20-95/10 passive                  DIP     0-50      0-60        0-55       0-70    HAND AROM  Extension-Flexion     RIGHT                                                           5/09/2023       INDEX   LONG    RING    LITTLE                        MCP   0-75      0-65       10-65    0-65               PIP     0-95      0-90       10-95    15-95/10 passive               DIP     0-55     0-65        0-60       0-70     Hand -  Strength  Carl dynamometer 2nd Rung in lbs;  Pinches:pinch gauge average of 2 trials in psi    5/09/23 4/27/23 3/27/23   Position Right Right Right   Hand : Trial 1 43 40 35   Hand : Trial 2 45 40 31   Hand : Trial 3 46 40 31    Average 44 40 32     Patient received the following treatment:   Supervised modalities after being cleared for contradictions for 10 minutes including:      Fluidotherapy to right hand for 10 minutes to increase tissue elasticity, desensitization, sensory re-education and for pain management. Air flow 80 heat 112 degrees with Active range of motion in it    Manual therapy techniques to increase joint mobilization and soft tissue mobilization for 6 minutes including:    -Scar massage to right hand for 6 minutes  to decrease dense adhesions and improve tensile glide.   Therapeutic exercises to improve functional performance while increasing strength, endurance, ROM,  and flexibility for 21 minutes, including:   -Active assistive/Passive range of motion wrist extension, deviation 10 repetitions each  -Active assistive/Passive range of motion of fingers each finger then composite and place and hold in a fist  -Passive stretch of little and ring into extension holding 10 seconds x 5 repetitions   -Gripper  rubberband   1 thick red and 2 yellow rubberbands 20 repetitions   able to close 10 completely   -Power web GREEN grasping and twisting clockwise and counter clockwise then pushing into web with attempting to flatten hand and stretch into wrist extension 20 repetitions each  Therapeutic activities to  "improve functional performance with use of dynamic activities for 8 minutes  including:  -GREEN putty grasping with ulnar side of hand to palm and pulling  -GREEN putty "cookie cutting" with 1" cylinder   Instruction and demonstration given of all introduced   Patient required moderate cuing for correct performance of exercise.    Home Exercises and Education Provided   Education provided:  - discussed insurance limitation  - Progress towards goals     Written Home Exercises Provided: Patient instructed to cont prior HEP.  Exercises were reviewed and he was able to demonstrate them prior to the end of the session. he demonstrated good  understanding of the HEP provided.   See EMR under Patient Instructions for exercises provided prior visit.     Assessment   Patient continues with a constant burning at wrist crease of 3/10 pain. If he uses his hand for repetitive grasping and tight gripping he experiences episodes of electrical shocks at night of up to 7/10 pain in the hand up into little finger. Burning pain at wrist changes to electrical pain also.  However, he does note that the episodes are only a few and don't last long. Palpation and pressure at base of little finger continues to be very pain and requires him to be cautious when grasping things. He also has episodes of shaking at his hand with use. His  strength continues to improve. Pt  will continue to benefit from skilled OT to further address pain and deficits in ROM and strength which are hindering ability to perform self care and IADLs. Patient's cultural, spiritual, and educational needs were considered.    Goals:  Short Term Goals  Independent in home program of ROM in 2 visits-met  Patient with full use of right hand for all self care in 2 weeks-met  Patient able to open screw top containers with no difficulty in 3 weeks-met  Patient with decrease in pain to 1/10 at surgical site and full wrist ROM in 3 weeks-ongoing  Long Term Goals  Patient with " no pain at surgery site and increase in fingers flexion to full fist in 5 weeks-met  Patient able to grasp and stabilize objects with right in 6 weeks-ongoing  Patient able to carry and lift up to 8# with the right hand in 6 weeks-ongoing  Improve FOTO score by 12 points indicating improved function of right hand in 6 weeks-ongoing  Plan   Continue skilled therapy 2 times a week for 2 weeks  including therapeutic exercises and activities, manual therapy, and pain modalities  as needed  He has 5 more approved visits. Plan to complete them then may consider discharging. Patient is hoping to undergo further surgery to address pain at little finger soon.     NELY Hines

## 2023-05-09 NOTE — PROGRESS NOTES
[facility] Occupational Therapy Progress Note   Date: 5/9/2023  Name: Aleksandar Knight Sr.  Clinic Number: [id number]  Therapy Diagnosis: right hand pain  Physician: Gregor Boucher MD  Surgical Procedure and Date: Carpal tunnel and Guyon's canal release on 3/01/23  Visit # / Visits authorized: 12/17 including eval  Subjective   Pt reports: Constant burning at wrist crease of  3/10 pain up to 7/10 if use hand a lot and changes to electrical impulses. Persisting electrical pain in the little finger with pressure at base of little finger. Hand still shakes whenever have to put pressure with the hand. Get shocking pain at night whenever use hand a lot during the day and will last for a day or so.   Wrist -  Range Of Motion    5/09/23 3/29                        3/15/2023                  Motion A/PROM    LEFT  A/PROM  Left A/PROM Left   Flexion 45/NT 30 30/45   Extension 35/45 35/50 30/50   Ulnar Deviation 20/NT 20 20/30   Radial Deviation 20 NT 20 20/NT      HAND AROM   Extension-Flexion     RIGHT                                                                 3/29/2023         INDEX   LONG    RING    LITTLE                          MCP   0-75      0-65       10-75    0-70                 PIP     0-95      0-90       10-90    20-95/10 passive                  DIP     0-50     0-60        0-55       0-70     HAND AROM  Extension-Flexion     RIGHT                                                           5/09/2023       INDEX   LONG    RING    LITTLE                        MCP   0-75      0-65       10-65    0-65               PIP     0-95      0-90       10-95    15-95/10 passive               DIP     0-55     0-65        0-60       0-70      Hand -  Strength  Carl dynamometer 2nd Rung in lbs;  Pinches:pinch gauge average of 2 trials in psi    5/09/23 4/27/23 3/27/23   Position Right Right Right   Hand : Trial 1 43 40 35   Hand : Trial 2 45 40 31   Hand : Trial 3 46 40 31    Average  44 40 32      Assessment   Patient continues with a constant burning at wrist crease of 3/10 pain. If he uses his hand for repetitive grasping and tight gripping he experiences episodes of electrical shocks at night of up to 7/10 pain in the hand up into little finger. Burning pain at wrist changes to electrical pain also.  However, he does note that the episodes are only a few and don't last long. Palpation and pressure at base of little finger continues to be very pain and requires him to be cautious when grasping things. He also has episodes of shaking at his hand with use. His  strength continues to improve  Goals:  Short Term Goals  Independent in home program of ROM in 2 visits-met  Patient with full use of right hand for all self care in 2 weeks-met  Patient able to open screw top containers with no difficulty in 3 weeks-met  Patient with decrease in pain to 1/10 at surgical site and full wrist ROM in 3 weeks-ongoing  Long Term Goals  Patient with no pain at surgery site and increase in fingers flexion to full fist in 5 weeks-met  Patient able to grasp and stabilize objects with right in 6 weeks-ongoing  Patient able to carry and lift up to 8# with the right hand in 6 weeks-ongoing  Improve FOTO score by 12 points indicating improved function of right hand in 6 weeks-ongoing  Plan   Continue skilled therapy 2 times a week for 3 weeks  including therapeutic exercises and activities, manual therapy, and pain modalities  as needed  He has 5 more approved visits. Plan to complete them then may consider discharging. Patient is hoping to undergo further surgery to address pain at little finger soon.      NELY Hines     I read and agree with above plan of care    ________________________________________________________  Gregor Boucher MD                                                   Date

## 2023-05-10 ENCOUNTER — CLINICAL SUPPORT (OUTPATIENT)
Dept: REHABILITATION | Facility: HOSPITAL | Age: 68
End: 2023-05-10
Payer: MEDICARE

## 2023-05-10 DIAGNOSIS — Z98.890 HX OF HAND SURGERY: Primary | ICD-10-CM

## 2023-05-10 PROCEDURE — 97530 THERAPEUTIC ACTIVITIES: CPT | Mod: KX,PN

## 2023-05-10 PROCEDURE — 97110 THERAPEUTIC EXERCISES: CPT | Mod: KX,PN

## 2023-05-10 PROCEDURE — 97022 WHIRLPOOL THERAPY: CPT | Mod: KX,PN

## 2023-05-10 NOTE — PROGRESS NOTES
[facility] Daily Treatment [facility]   Date: 5/10/2023  Name: Aleksandar Knight Sr.  Clinic Number: [id number]  Therapy Diagnosis: right hand pain  Physician: Gregor Boucher MD  Physician Orders: eval and treat carpal tunnel protocol  Medical Diagnosis: Guyon's canal release  Surgical Procedure and Date: Carpal tunnel and Guyon's canal release on 3/01/23  Evaluation Date: 3/15/2023  Insurance Authorization Period Expiration: 5/13/2023  Plan of Care Certification Period: 6/08/23  Date of Return to MD: 5/11/23  Visit # / Visits authorized: 13/17 including eval  Time In:6:45  Time Out: 7:30  Total Billable Time:45 minutes    Precautions:   Standard  post op    FOTO  3/15/23  41%     4/20/23  50%  Subjective   Pt reports: It's definitely getting better, stronger. It still hard to do some things. Can't really hold onto some things with my right.    was compliant with home exercise program given    Response to previous treatment: unremarkable  Functional change: none noted  Pain: end of session  2/10   Location: right hand      Objective   Patient received the following treatment:   Supervised modalities after being cleared for contradictions for 10 minutes including:      Fluidotherapy to right hand for 10 minutes to increase tissue elasticity, desensitization, sensory re-education and for pain management. Air flow 80 heat 112 degrees with Active range of motion in it    Manual therapy techniques to increase joint mobilization and soft tissue mobilization for 6 minutes including:    -Scar massage to right hand for 6 minutes  to decrease dense adhesions and improve tensile glide.   Therapeutic exercises to improve functional performance while increasing strength, endurance, ROM,  and flexibility for 21 minutes, including:   -Active assistive/Passive range of motion wrist extension, deviation 10 repetitions each  -Active assistive/Passive range of motion of fingers each finger then  composite and place and hold in a fist  -Passive stretch of little and ring into extension holding 10 seconds x 5 repetitions   -Gripper at 50# x 10 repetitions 35#  6 x 4 repetitions   -GREEN Theraputty gripping and pulling with flat fist ulnar side of hand   Therapeutic activities to improve functional performance with use of dynamic activities for 8 minutes  including:  --screw board placing and removing 10 screws with gross  on screw    -Flexbar GREEN simulated towel wringing  and jar opening with ulnar side of hand x 20 repetitions each  Instruction and demonstration given of all introduced   Patient required moderate cuing for correct performance of exercise.    Home Exercises and Education Provided   Education provided:  - discussed insurance limitation  - Progress towards goals     Written Home Exercises Provided: Patient instructed to cont prior HEP.  Exercises were reviewed and he was able to demonstrate them prior to the end of the session. he demonstrated good  understanding of the HEP provided.   See EMR under Patient Instructions for exercises provided prior visit.     Assessment   Patient with electrical pain from ~ distal palmar crease into little proximal phalanx with scar massage. Also notes this pain with tight gripping hindering daily activities. He is able to close gripper ~ 75% at 55# resistance. Jumping several times with scar tissue mobilization and noted hand jerking a few times with gripping. Pt  will continue to benefit from skilled OT to further address pain and deficits in ROM and strength which are hindering ability to perform self care and IADLs. Patient's cultural, spiritual, and educational needs were considered.    Goals:  Short Term Goals  Independent in home program of ROM in 2 visits-met  Patient with full use of right hand for all self care in 2 weeks-met  Patient able to open screw top containers with no difficulty in 3 weeks-met  Patient with decrease in pain to 1/10  at surgical site and full wrist ROM in 3 weeks-ongoing  Long Term Goals  Patient with no pain at surgery site and increase in fingers flexion to full fist in 5 weeks-met  Patient able to grasp and stabilize objects with right in 6 weeks-ongoing  Patient able to carry and lift up to 8# with the right hand in 6 weeks-ongoing  Improve FOTO score by 12 points indicating improved function of right hand in 6 weeks-ongoing  Plan   Continue skilled therapy 2 times a week for 2 weeks  including therapeutic exercises and activities, manual therapy, and pain modalities  as needed    NELY Hines

## 2023-05-15 ENCOUNTER — OFFICE VISIT (OUTPATIENT)
Dept: FAMILY MEDICINE | Facility: CLINIC | Age: 68
End: 2023-05-15
Payer: MEDICARE

## 2023-05-15 VITALS
HEART RATE: 76 BPM | WEIGHT: 200 LBS | OXYGEN SATURATION: 99 % | HEIGHT: 66 IN | BODY MASS INDEX: 32.14 KG/M2 | SYSTOLIC BLOOD PRESSURE: 136 MMHG | DIASTOLIC BLOOD PRESSURE: 60 MMHG

## 2023-05-15 DIAGNOSIS — N52.9 ERECTILE DYSFUNCTION, UNSPECIFIED ERECTILE DYSFUNCTION TYPE: ICD-10-CM

## 2023-05-15 DIAGNOSIS — N40.1 BPH ASSOCIATED WITH NOCTURIA: ICD-10-CM

## 2023-05-15 DIAGNOSIS — M24.541 CONTRACTURE OF RIGHT HAND: ICD-10-CM

## 2023-05-15 DIAGNOSIS — R35.1 BPH ASSOCIATED WITH NOCTURIA: ICD-10-CM

## 2023-05-15 DIAGNOSIS — E78.2 MIXED HYPERLIPIDEMIA: ICD-10-CM

## 2023-05-15 DIAGNOSIS — I10 ESSENTIAL HYPERTENSION, BENIGN: Primary | ICD-10-CM

## 2023-05-15 PROCEDURE — 1101F PT FALLS ASSESS-DOCD LE1/YR: CPT | Mod: CPTII,S$GLB,, | Performed by: FAMILY MEDICINE

## 2023-05-15 PROCEDURE — 3075F PR MOST RECENT SYSTOLIC BLOOD PRESS GE 130-139MM HG: ICD-10-PCS | Mod: CPTII,S$GLB,, | Performed by: FAMILY MEDICINE

## 2023-05-15 PROCEDURE — 3288F PR FALLS RISK ASSESSMENT DOCUMENTED: ICD-10-PCS | Mod: CPTII,S$GLB,, | Performed by: FAMILY MEDICINE

## 2023-05-15 PROCEDURE — 1159F MED LIST DOCD IN RCRD: CPT | Mod: CPTII,S$GLB,, | Performed by: FAMILY MEDICINE

## 2023-05-15 PROCEDURE — 1101F PR PT FALLS ASSESS DOC 0-1 FALLS W/OUT INJ PAST YR: ICD-10-PCS | Mod: CPTII,S$GLB,, | Performed by: FAMILY MEDICINE

## 2023-05-15 PROCEDURE — 1159F PR MEDICATION LIST DOCUMENTED IN MEDICAL RECORD: ICD-10-PCS | Mod: CPTII,S$GLB,, | Performed by: FAMILY MEDICINE

## 2023-05-15 PROCEDURE — 4010F ACE/ARB THERAPY RXD/TAKEN: CPT | Mod: CPTII,S$GLB,, | Performed by: FAMILY MEDICINE

## 2023-05-15 PROCEDURE — 3288F FALL RISK ASSESSMENT DOCD: CPT | Mod: CPTII,S$GLB,, | Performed by: FAMILY MEDICINE

## 2023-05-15 PROCEDURE — 99214 PR OFFICE/OUTPT VISIT, EST, LEVL IV, 30-39 MIN: ICD-10-PCS | Mod: S$GLB,,, | Performed by: FAMILY MEDICINE

## 2023-05-15 PROCEDURE — 3008F BODY MASS INDEX DOCD: CPT | Mod: CPTII,S$GLB,, | Performed by: FAMILY MEDICINE

## 2023-05-15 PROCEDURE — 4010F PR ACE/ARB THEARPY RXD/TAKEN: ICD-10-PCS | Mod: CPTII,S$GLB,, | Performed by: FAMILY MEDICINE

## 2023-05-15 PROCEDURE — 3078F PR MOST RECENT DIASTOLIC BLOOD PRESSURE < 80 MM HG: ICD-10-PCS | Mod: CPTII,S$GLB,, | Performed by: FAMILY MEDICINE

## 2023-05-15 PROCEDURE — 3075F SYST BP GE 130 - 139MM HG: CPT | Mod: CPTII,S$GLB,, | Performed by: FAMILY MEDICINE

## 2023-05-15 PROCEDURE — 3078F DIAST BP <80 MM HG: CPT | Mod: CPTII,S$GLB,, | Performed by: FAMILY MEDICINE

## 2023-05-15 PROCEDURE — 3008F PR BODY MASS INDEX (BMI) DOCUMENTED: ICD-10-PCS | Mod: CPTII,S$GLB,, | Performed by: FAMILY MEDICINE

## 2023-05-15 PROCEDURE — 99214 OFFICE O/P EST MOD 30 MIN: CPT | Mod: S$GLB,,, | Performed by: FAMILY MEDICINE

## 2023-05-15 RX ORDER — ATORVASTATIN CALCIUM 40 MG/1
40 TABLET, FILM COATED ORAL DAILY
Qty: 90 TABLET | Refills: 3 | Status: SHIPPED | OUTPATIENT
Start: 2023-05-15 | End: 2024-05-14

## 2023-05-15 RX ORDER — SILDENAFIL 100 MG/1
100 TABLET, FILM COATED ORAL DAILY PRN
Qty: 30 TABLET | Refills: 1 | Status: SHIPPED | OUTPATIENT
Start: 2023-05-15 | End: 2024-05-14

## 2023-05-15 RX ORDER — AMLODIPINE AND BENAZEPRIL HYDROCHLORIDE 10; 20 MG/1; MG/1
1 CAPSULE ORAL DAILY
Qty: 90 CAPSULE | Refills: 3 | Status: SHIPPED | OUTPATIENT
Start: 2023-05-15

## 2023-05-15 NOTE — PROGRESS NOTES
SUBJECTIVE:   HPI: Aleksandar Knight Sr.  is a 67 y.o. male who presents for annual physical .   Annual Exam (Went over meds verbally// SW)    The patient with well-controlled hypertension and hyperlipidemia presents for his routine examination.  He continues to take medications without any difficulty.  He does report issues with nocturia but states he does not have any trouble with urine flow.  Was given Flomax but did not note any difference with this therapy so he discontinued it.  He does mention some concerns however with erectile dysfunction.  He has a new partner and is able to obtain an erection but can not maintain.    Labs performed and are all within normal limits including lipid, CMP and PSA.  He still remains physically active however there has been some decrease in activity secondary to issues with his right hand.  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  However lesion did not heal well and patient with still limited range of activity and significant pain with worsening of his scarring.  He was eventually seen by Dr. Gregor Boucher hand specialist and has been doing better.  His pain has resolved.  He continues physical therapy.  Additional procedure to be scheduled later on to relieve contracture    He has received his pneumonia vaccines and has not desired any additional.  He is up-to-date with colon cancer screening          Telephone on 04/17/2023   Component Date Value Ref Range Status    Color, UA 05/01/2023 YELLOW  YELLOW Final    Appearance, UA 05/01/2023 CLEAR  CLEAR Final    Specific Gravity, UA 05/01/2023 1.016  1.001 - 1.035 Final    pH, UA 05/01/2023 6.5  5.0 - 8.0 Final    Glucose, UA 05/01/2023 NEGATIVE  NEGATIVE Final    Bilirubin, UA 05/01/2023 NEGATIVE  NEGATIVE Final    Ketones, UA 05/01/2023 NEGATIVE  NEGATIVE Final    Occult Blood UA 05/01/2023 NEGATIVE  NEGATIVE Final    Protein, UA 05/01/2023 NEGATIVE  NEGATIVE Final    Nitrite, UA 05/01/2023  NEGATIVE  NEGATIVE Final    Leukocytes, UA 05/01/2023 NEGATIVE  NEGATIVE Final    WBC Casts, UA 05/01/2023 NONE SEEN  < OR = 5 /HPF Final    RBC Casts, UA 05/01/2023 NONE SEEN  < OR = 2 /HPF Final    Squam Epithel, UA 05/01/2023 NONE SEEN  < OR = 5 /HPF Final    Bacteria, UA 05/01/2023 NONE SEEN  NONE SEEN /HPF Final    Hyaline Casts, UA 05/01/2023 NONE SEEN  NONE SEEN /LPF Final    Service Cmt: 05/01/2023    Final    Reflexive Urine Culture 05/01/2023    Final    TSH w/reflex to FT4 05/01/2023 2.32  0.40 - 4.50 mIU/L Final    Cholesterol 05/01/2023 150  <200 mg/dL Final    HDL 05/01/2023 41  > OR = 40 mg/dL Final    Triglycerides 05/01/2023 115  <150 mg/dL Final    LDL Cholesterol 05/01/2023 88  mg/dL (calc) Final    HDL/Cholesterol Ratio 05/01/2023 3.7  <5.0 (calc) Final    Non HDL Chol. (LDL+VLDL) 05/01/2023 109  <130 mg/dL (calc) Final    Glucose 05/01/2023 101 (H)  65 - 99 mg/dL Final    BUN 05/01/2023 20  7 - 25 mg/dL Final    Creatinine 05/01/2023 1.22  0.70 - 1.35 mg/dL Final    eGFR 05/01/2023 64  > OR = 60 mL/min/1.73m2 Final    BUN/Creatinine Ratio 05/01/2023 NOT APPLICABLE  6 - 22 (calc) Final    Sodium 05/01/2023 143  135 - 146 mmol/L Final    Potassium 05/01/2023 4.9  3.5 - 5.3 mmol/L Final    Chloride 05/01/2023 106  98 - 110 mmol/L Final    CO2 05/01/2023 29  20 - 32 mmol/L Final    Calcium 05/01/2023 9.5  8.6 - 10.3 mg/dL Final    Total Protein 05/01/2023 7.2  6.1 - 8.1 g/dL Final    Albumin 05/01/2023 4.6  3.6 - 5.1 g/dL Final    Globulin, Total 05/01/2023 2.6  1.9 - 3.7 g/dL (calc) Final    Albumin/Globulin Ratio 05/01/2023 1.8  1.0 - 2.5 (calc) Final    Total Bilirubin 05/01/2023 0.4  0.2 - 1.2 mg/dL Final    Alkaline Phosphatase 05/01/2023 57  35 - 144 U/L Final    AST 05/01/2023 18  10 - 35 U/L Final    ALT 05/01/2023 20  9 - 46 U/L Final    WBC 05/01/2023 5.5  3.8 - 10.8 Thousand/uL Final    RBC 05/01/2023 4.44  4.20 - 5.80 Million/uL Final    Hemoglobin 05/01/2023 12.7 (L)  13.2 - 17.1 g/dL  Final    Hematocrit 05/01/2023 40.0  38.5 - 50.0 % Final    MCV 05/01/2023 90.1  80.0 - 100.0 fL Final    MCH 05/01/2023 28.6  27.0 - 33.0 pg Final    MCHC 05/01/2023 31.8 (L)  32.0 - 36.0 g/dL Final    RDW 05/01/2023 12.9  11.0 - 15.0 % Final    Platelets 05/01/2023 226  140 - 400 Thousand/uL Final    MPV 05/01/2023 10.2  7.5 - 12.5 fL Final    Neutrophils, Abs 05/01/2023 3,053  1,500 - 7,800 cells/uL Final    Lymph # 05/01/2023 1,727  850 - 3,900 cells/uL Final    Mono # 05/01/2023 534  200 - 950 cells/uL Final    Eos # 05/01/2023 160  15 - 500 cells/uL Final    Baso # 05/01/2023 28  0 - 200 cells/uL Final    Neutrophils Relative 05/01/2023 55.5  % Final    Lymph % 05/01/2023 31.4  % Final    Mono % 05/01/2023 9.7  % Final    Eosinophil % 05/01/2023 2.9  % Final    Basophil % 05/01/2023 0.5  % Final    PROSTATE SPECIFIC ANTIGEN, SCR - Q* 05/01/2023 1.04  < OR = 4.00 ng/mL Final      (Not in a hospital admission)    Review of patient's allergies indicates:  No Known Allergies  Current Outpatient Medications on File Prior to Visit   Medication Sig Dispense Refill    [DISCONTINUED] amlodipine-benazepril 10-20mg (LOTREL) 10-20 mg per capsule Take 1 capsule by mouth once daily. 90 capsule 3    [DISCONTINUED] atorvastatin (LIPITOR) 40 MG tablet Take 1 tablet (40 mg total) by mouth once daily. For cholesterol 90 tablet 3    [DISCONTINUED] tamsulosin (FLOMAX) 0.4 mg Cap Take 1 capsule (0.4 mg total) by mouth once daily. 90 capsule 1    [DISCONTINUED] methylPREDNISolone (MEDROL DOSEPACK) 4 mg tablet use as directed (Patient not taking: Reported on 12/13/2022) 1 each 0     No current facility-administered medications on file prior to visit.     Past Medical History:   Diagnosis Date    Dupuytren contracture 2022    Hyperlipidemia     Hypertension      Past Surgical History:   Procedure Laterality Date    COLONOSCOPY N/A 11/20/2019    Procedure: COLONOSCOPY;  Surgeon: Nabeel Hart MD;  Location: [facility];  Service:  "Endoscopy;  Laterality: N/A;    DUPUYTREN CONTRACTURE RELEASE Right 07/27/2022    Procedure: RELEASE, DUPUYTREN CONTRACTURE;  Surgeon: Rafi Andre MD;  Location: [facility];  Service: Orthopedics;  Laterality: Right;    DUPUYTREN CONTRACTURE RELEASE  03/2023    Dr. Gregor Boucher [facility] [place]     Family History   Problem Relation Age of Onset    Cancer Father         unkn type     Social History     Tobacco Use    Smoking status: Never     Passive exposure: Never    Smokeless tobacco: Never   Substance Use Topics    Alcohol use: Never    Drug use: Never      Health Maintenance Topics with due status: Not Due       Topic Last Completion Date    Colorectal Cancer Screening 11/20/2019    Lipid Panel 05/01/2023     Immunization History   Administered Date(s) Administered    Pneumococcal Conjugate - 13 Valent 08/15/2018    Pneumococcal Polysaccharide - 23 Valent 09/26/2019       Review of Systems   Constitutional:  Negative for fatigue, fever and unexpected weight change.   HENT:  Negative for congestion, postnasal drip, rhinorrhea and sore throat.    Eyes:  Negative for photophobia, pain and visual disturbance.   Respiratory:  Negative for cough, shortness of breath and wheezing.    Cardiovascular:  Negative for chest pain and palpitations.   Gastrointestinal:  Negative for constipation, diarrhea, nausea and vomiting.   Genitourinary:  Negative for difficulty urinating, dysuria, frequency, hematuria and urgency.   Musculoskeletal:  Negative for arthralgias and myalgias.   Skin:  Negative for rash.   Neurological:  Negative for dizziness and headaches.   Psychiatric/Behavioral:  Negative for sleep disturbance.     OBJECTIVE:      Vitals:    05/15/23 0753   BP: 136/60   Pulse: 76   SpO2: 99%   Weight: 90.7 kg (200 lb)   Height: 5' 6" (1.676 m)     Physical Exam  Constitutional:       Appearance: Normal appearance.   HENT:      Head: Normocephalic and atraumatic.      Mouth/Throat:      Mouth: Mucous membranes are " moist.   Eyes:      Conjunctiva/sclera: Conjunctivae normal.   Cardiovascular:      Rate and Rhythm: Normal rate.   Pulmonary:      Effort: Pulmonary effort is normal.   Musculoskeletal:         General: Deformity present.   Neurological:      General: No focal deficit present.      Mental Status: He is alert and oriented to person, place, and time.   Psychiatric:         Mood and Affect: Mood normal.         Behavior: Behavior normal.      Assessment:       1. Essential hypertension, benign    2. Mixed hyperlipidemia    3. Contracture of right hand    4. BPH associated with nocturia    5. Erectile dysfunction, unspecified erectile dysfunction type        Plan:       Essential hypertension, benign  -     amlodipine-benazepril 10-20mg (LOTREL) 10-20 mg per capsule; Take 1 capsule by mouth once daily.  Dispense: 90 capsule; Refill: 3    Mixed hyperlipidemia  Comments:  Doing well on current dose  Orders:  -     atorvastatin (LIPITOR) 40 MG tablet; Take 1 tablet (40 mg total) by mouth once daily. For cholesterol  Dispense: 90 tablet; Refill: 3    Contracture of right hand  Comments:  continue orthopedic follow up    BPH associated with nocturia  Comments:  no issues    Erectile dysfunction, unspecified erectile dysfunction type  -     sildenafiL (VIAGRA) 100 MG tablet; Take 1 tablet (100 mg total) by mouth daily as needed for Erectile Dysfunction.  Dispense: 30 tablet; Refill: 1        Counseled on age and gender appropriate medical preventative services, including cancer screenings, immunizations, overall nutritional health, need for a consistent exercise regimen and an overall push towards maintaining a vigorous and active lifestyle.      Follow up in about 1 year (around 5/15/2024) for HTN.

## 2023-05-17 ENCOUNTER — CLINICAL SUPPORT (OUTPATIENT)
Dept: REHABILITATION | Facility: HOSPITAL | Age: 68
End: 2023-05-17
Payer: MEDICARE

## 2023-05-17 DIAGNOSIS — Z98.890 HX OF HAND SURGERY: Primary | ICD-10-CM

## 2023-05-17 PROCEDURE — 97022 WHIRLPOOL THERAPY: CPT | Mod: KX,PN

## 2023-05-17 PROCEDURE — 97110 THERAPEUTIC EXERCISES: CPT | Mod: KX,PN

## 2023-05-17 PROCEDURE — 97530 THERAPEUTIC ACTIVITIES: CPT | Mod: KX,PN

## 2023-05-17 NOTE — PROGRESS NOTES
[facility] Occupational Therapy Daily Treatment Note   Date: 5/17/2023  Name: Aleksandar Knight Sr.  Clinic Number: [id number]  Therapy Diagnosis: right hand pain  Physician: Gregor Boucher MD  Physician Orders: eval and treat carpal tunnel protocol  Medical Diagnosis: Guyon's canal release  Surgical Procedure and Date: Carpal tunnel and Guyon's canal release on 3/01/23  Evaluation Date: 3/15/2023  Insurance Authorization Period Expiration: 5/13/2023  Plan of Care Certification Period: 6/08/23  Date of Return to MD: in 6 weeks from 5/17/23  Visit # / Visits authorized: 14/17 including eval  Time In:6:55  Time Out: 7:45  Total Billable Time:50 minutes    Precautions:   Standard  post op    FOTO  3/15/23  41%     4/20/23  50%  Subjective   Pt reports: Doctor said that he wants me to continue still have some swelling, burning and need more strength.  Burning is getting less. Still have to be careful with how I  something so don't hurt the little finger.   was compliant with home exercise program given    Response to previous treatment: unremarkable  Functional change: none noted  Pain: end of session  2/10   Location: right hand      Objective   Patient received the following treatment:   Supervised modalities after being cleared for contradictions for 10 minutes including:      Fluidotherapy to right hand for 10 minutes to increase tissue elasticity, desensitization, sensory re-education and for pain management. Air flow 80 heat 112 degrees with Active range of motion in it    Manual therapy techniques to increase joint mobilization and soft tissue mobilization for 6 minutes including:    -Scar massage to right hand for 6 minutes  to decrease dense adhesions and improve tensile glide.   Therapeutic exercises to improve functional performance while increasing strength, endurance, ROM,  and flexibility for 26 minutes, including:   -Active assistive/Passive range of motion wrist extension,  deviation 10 repetitions each  -Active assistive/Passive range of motion of fingers each finger then composite and place and hold in a fist  -Passive stretch of little and ring into extension holding 10 seconds x 5 repetitions   -Gripper at 55# 10 repetitions 50# x 15 repetitions 35#  20 and 6 repetitions   -GREEN Theraputty gripping and pulling with flat fist ulnar side of hand   Therapeutic activities to improve functional performance with use of dynamic activities for 8 minutes  including:  -dowel with string with 5# rolled up 5 repetitions   -Flexbar GREEN simulated towel wringing  and jar opening with ulnar side of hand x 50 repetitions each  Instruction and demonstration given of all introduced   Patient required moderate cuing for correct performance of exercise.    Home Exercises and Education Provided   Education provided:  - discussed insurance limitation  - Progress towards goals     Written Home Exercises Provided: Patient instructed to cont prior HEP.  Exercises were reviewed and he was able to demonstrate them prior to the end of the session. he demonstrated good  understanding of the HEP provided.   See EMR under Patient Instructions for exercises provided prior visit.     Assessment   Patient noting burning sensation is lessening. He continues with pain at little finger with gripping activities. Jumping several times with scar tissue mobilization and noted hand jerking a few times with gripping. Doctor wishes patient to continue with therapy and revisit possible surgery for little finger in 6 weeks. Pt  will continue to benefit from skilled OT to further address pain and deficits in ROM and strength which are hindering ability to perform self care and IADLs. Patient's cultural, spiritual, and educational needs were considered.    Goals:  Short Term Goals  Independent in home program of ROM in 2 visits-met  Patient with full use of right hand for all self care in 2 weeks-met  Patient able to open  screw top containers with no difficulty in 3 weeks-met  Patient with decrease in pain to 1/10 at surgical site and full wrist ROM in 3 weeks-ongoing  Long Term Goals  Patient with no pain at surgery site and increase in fingers flexion to full fist in 5 weeks-met  Patient able to grasp and stabilize objects with right in 6 weeks-ongoing  Patient able to carry and lift up to 8# with the right hand in 6 weeks-ongoing  Improve FOTO score by 12 points indicating improved function of right hand in 6 weeks-ongoing  Plan   Continue skilled therapy 2 times a week for 2 weeks  including therapeutic exercises and activities, manual therapy, and pain modalities  as needed    NELY Hines

## 2023-05-18 ENCOUNTER — CLINICAL SUPPORT (OUTPATIENT)
Dept: REHABILITATION | Facility: HOSPITAL | Age: 68
End: 2023-05-18
Payer: MEDICARE

## 2023-05-18 DIAGNOSIS — Z98.890 HX OF HAND SURGERY: Primary | ICD-10-CM

## 2023-05-18 PROCEDURE — 97110 THERAPEUTIC EXERCISES: CPT | Mod: KX,PN

## 2023-05-18 PROCEDURE — 97022 WHIRLPOOL THERAPY: CPT | Mod: KX,PN

## 2023-05-18 PROCEDURE — 97530 THERAPEUTIC ACTIVITIES: CPT | Mod: KX,PN

## 2023-05-18 NOTE — PROGRESS NOTES
[facility] Daily [facility]   Date: 5/18/2023  Name: Aleksandar Knight Sr.  Clinic Number: [id number]  Therapy Diagnosis: right hand pain  Physician: Gregor Boucher MD  Physician Orders: eval and treat carpal tunnel protocol  Medical Diagnosis: Guyon's canal release  Surgical Procedure and Date: Carpal tunnel and Guyon's canal release on 3/01/23  Evaluation Date: 3/15/2023  Insurance Authorization Period Expiration: 6/14/2023  Plan of Care Certification Period: 6/30/23  Date of Return to MD: 5/15/23  Visit # / Visits authorized: 15/24 including eval  Time In:6:45  Time Out: 7:30  Total Billable Time:45 minutes    Precautions:   Standard  post op    FOTO  3/15/23  41%     4/20/23  50%  Subjective   Pt reports: It's not shaking as much. Still lights me up to touch or put pressure on that spot on the little finger.    was compliant with home exercise program given    Response to previous treatment: unremarkable  Functional change: none noted  Pain:0/10   Location: right hand      Objective   Patient received the following treatment:   Supervised modalities after being cleared for contradictions for 10 minutes including:      Fluidotherapy to right hand for 10 minutes to increase tissue elasticity, desensitization, sensory re-education and for pain management. Air flow 80 heat 112 degrees with Active range of motion in it    Manual therapy techniques to increase joint mobilization and soft tissue mobilization for 6 minutes including:    -Scar massage to right hand for 6 minutes  to decrease dense adhesions and improve tensile glide.   Therapeutic exercises to improve functional performance while increasing strength, endurance, ROM,  and flexibility for 21 minutes, including:   -Active assistive/Passive range of motion wrist extension, deviation 10 repetitions each  -Active assistive/Passive range of motion of fingers each finger then composite and place and hold in a  fist  -Passive stretch of little and ring into extension holding 10 seconds x 5 repetitions   -Gripper at 55# 10 repetitions 50# x 15 repetitions 35#  20 and 6 repetitions   -GREEN power web pushing into it to stretch fingers and wrist into extension, pulling and turning supinating and pronating for strengthening  Therapeutic activities to improve functional performance with use of dynamic activities for 8 minutes  including:  --screw board placing and removing 10 screws with gross  on screw    Instruction and demonstration given of all introduced   Patient required moderate cuing for correct performance of exercise.    Home Exercises and Education Provided   Education provided:  - discussed insurance limitation  - Progress towards goals     Written Home Exercises Provided: Patient instructed to cont prior HEP.  Exercises were reviewed and he was able to demonstrate them prior to the end of the session. he demonstrated good  understanding of the HEP provided.   See EMR under Patient Instructions for exercises provided prior visit.     Assessment   Patient noting trembling of hand is lessening. He appears to be tolerating scar massage with less pain indicated by less grimacing and jumping. He noted pain at little finger with stretching into extension. Pt  will continue to benefit from skilled OT to further address pain and deficits in ROM and strength which are hindering ability to perform self care and IADLs. Patient's cultural, spiritual, and educational needs were considered.    Goals:  Short Term Goals  Independent in home program of ROM in 2 visits-met  Patient with full use of right hand for all self care in 2 weeks-met  Patient able to open screw top containers with no difficulty in 3 weeks-met  Patient with decrease in pain to 1/10 at surgical site and full wrist ROM in 3 weeks-ongoing  Long Term Goals  Patient with no pain at surgery site and increase in fingers flexion to full fist in 5  weeks-met  Patient able to grasp and stabilize objects with right in 6 weeks-ongoing  Patient able to carry and lift up to 8# with the right hand in 6 weeks-ongoing  Improve FOTO score by 12 points indicating improved function of right hand in 6 weeks-ongoing  Plan   Continue skilled therapy 2 times a week for 2 weeks  including therapeutic exercises and activities, manual therapy, and pain modalities  as needed    NELY Hines

## 2023-05-30 ENCOUNTER — CLINICAL SUPPORT (OUTPATIENT)
Dept: REHABILITATION | Facility: HOSPITAL | Age: 68
End: 2023-05-30
Payer: MEDICARE

## 2023-05-30 DIAGNOSIS — Z98.890 HX OF HAND SURGERY: Primary | ICD-10-CM

## 2023-05-30 PROCEDURE — 97110 THERAPEUTIC EXERCISES: CPT | Mod: KX,PN

## 2023-05-30 PROCEDURE — 97022 WHIRLPOOL THERAPY: CPT | Mod: KX,PN

## 2023-05-30 NOTE — PROGRESS NOTES
[facility] Occupational Therapy Daily Treatment Note   Date: 5/30/2023  Name: Aleksandar Knight Sr.  Clinic Number: [id number]  Therapy Diagnosis: right hand pain  Physician: Gregor Boucher MD  Physician Orders: eval and treat carpal tunnel protocol  Medical Diagnosis: Guyon's canal release  Surgical Procedure and Date: Carpal tunnel and Guyon's canal release on 3/01/23  Evaluation Date: 3/15/2023  Insurance Authorization Period Expiration: 6/14/2023  Plan of Care Certification Period: 6/30/23  Date of Return to MD: 6/15/23  Visit # / Visits authorized: 16/24 including eval  Time In:7:25  Time Out: 8:15  Total Billable Time:50 minutes    Precautions:   Standard  post op    FOTO  3/15/23  41%     4/20/23  50%  Subjective   Pt reports: Went camping. Had pain in the hand just about every night but not too bad.   was compliant with home exercise program given    Response to previous treatment: unremarkable  Functional change: none noted  Pain:0/10   Location: right hand      Objective   Patient received the following treatment:   Supervised modalities after being cleared for contradictions for 10 minutes including:      Fluidotherapy to right hand for 10 minutes to increase tissue elasticity, desensitization, sensory re-education and for pain management. Air flow 80 heat 112 degrees with Active range of motion in it    Manual therapy techniques to increase joint mobilization and soft tissue mobilization for 6 minutes including:    -Scar massage to right hand for 6 minutes  to decrease dense adhesions and improve tensile glide.   Therapeutic exercises to improve functional performance while increasing strength, endurance, ROM,  and flexibility for 30 minutes, including:   -Active assistive/Passive range of motion wrist extension, deviation 10 repetitions each  -Active assistive/Passive range of motion of fingers each finger then composite and place and hold in a fist  -Passive stretch of little and  ring into extension holding 10 seconds x 5 repetitions   -Gripper at 55# 10 repetitions 50# x 15 repetitions 35#  20 repetitions   -GREEN power web pushing into it to stretch fingers and wrist into extension, pulling and turning supinating and pronating for strengthening  ADDED: Velcro exercise board rolling of dowel with handle x 5 repetitions forward and backward  Therapeutic activities to improve functional performance with use of dynamic activities for 4 minutes  including:  -Flexbar GREEN simulated towel wringing  and jar opening with ulnar side of hand x 20 repetitions each  -Power web GREEN grasping and twisting clockwise and counter clockwise then pushing into web with attempting to flatten hand and stretch into wrist extension 20 repetitions each    Instruction and demonstration given of all introduced   Patient required moderate cuing for correct performance of exercise.    Home Exercises and Education Provided   Education provided:  - discussed insurance limitation  - Progress towards goals     Written Home Exercises Provided: Patient instructed to cont prior HEP.  Exercises were reviewed and he was able to demonstrate them prior to the end of the session. he demonstrated good  understanding of the HEP provided.   See EMR under Patient Instructions for exercises provided prior visit.     Assessment   Patient noting had pain in hand at night almost every night since last seen. Difficulty with tight gripping exercise but was able to complete, required quick rests with sustained gripping.  Pt  will continue to benefit from skilled OT to further address pain and deficits in ROM and strength which are hindering ability to perform self care and IADLs. Patient's cultural, spiritual, and educational needs were considered.    Goals:  Short Term Goals  Independent in home program of ROM in 2 visits-met  Patient with full use of right hand for all self care in 2 weeks-met  Patient able to open screw top containers  with no difficulty in 3 weeks-met  Patient with decrease in pain to 1/10 at surgical site and full wrist ROM in 3 weeks-ongoing  Long Term Goals  Patient with no pain at surgery site and increase in fingers flexion to full fist in 5 weeks-met  Patient able to grasp and stabilize objects with right in 6 weeks-ongoing  Patient able to carry and lift up to 8# with the right hand in 6 weeks-ongoing  Improve FOTO score by 12 points indicating improved function of right hand in 6 weeks-ongoing  Plan   Continue skilled therapy 2 times a week for 2 weeks  including therapeutic exercises and activities, manual therapy, and pain modalities  as needed    NELY Hines

## 2023-06-01 ENCOUNTER — CLINICAL SUPPORT (OUTPATIENT)
Dept: REHABILITATION | Facility: HOSPITAL | Age: 68
End: 2023-06-01
Payer: MEDICARE

## 2023-06-01 DIAGNOSIS — Z98.890 HX OF HAND SURGERY: Primary | ICD-10-CM

## 2023-06-01 PROCEDURE — 97110 THERAPEUTIC EXERCISES: CPT | Mod: KX,PN

## 2023-06-01 PROCEDURE — 97022 WHIRLPOOL THERAPY: CPT | Mod: PN

## 2023-06-01 PROCEDURE — 97140 MANUAL THERAPY 1/> REGIONS: CPT | Mod: PN

## 2023-06-01 NOTE — PROGRESS NOTES
[facility] Daily [facility]   Date: 6/1/2023  Name: Aleksandar Knight Sr.  Clinic Number: [id number]  Therapy Diagnosis: right hand pain  Physician: Gregor Boucher MD  Physician Orders: eval and treat carpal tunnel protocol  Medical Diagnosis: Guyon's canal release  Surgical Procedure and Date: Carpal tunnel and Guyon's canal release on 3/01/23  Evaluation Date: 3/15/2023  Insurance Authorization Period Expiration: 6/14/2023  Plan of Care Certification Period: 6/30/23  Date of Return to MD: 6/15/23  Visit # / Visits authorized: 17/24 including eval  Time In:6:40  Time Out: 7:30  Total Billable Time:50 minutes    Precautions:   Standard  post op    FOTO  3/15/23  41%     4/20/23  50%  Subjective   Pt reports:    was compliant with home exercise program given    Response to previous treatment: unremarkable  Functional change: none noted  Pain:0/10   Location: right hand      Objective   Patient received the following treatment:   Supervised modalities after being cleared for contradictions for 10 minutes including:      Fluidotherapy to right hand for 10 minutes to increase tissue elasticity, desensitization, sensory re-education and for pain management. Air flow 80 heat 112 degrees with Active range of motion in it    Manual therapy techniques to increase joint mobilization and soft tissue mobilization for 8 minutes including:    -Scar massage to right hand for 8 minutes  to decrease dense adhesions and improve tensile glide.   Therapeutic exercises to improve functional performance while increasing strength, endurance, ROM,  and flexibility for 28 minutes, including:   -Active assistive/Passive range of motion wrist extension, deviation 10 repetitions each  -Active assistive/Passive range of motion of fingers each finger then composite and place and hold in a fist  -Passive stretch of little and ring into extension holding 10 seconds x 5 repetitions   -Gripper at 55#  10 repetitions 50# x 15 repetitions 35#  20 repetitions   - Velcro exercise board rolling of dowel with handle x 7 repetitions forward and backward  Therapeutic activities to improve functional performance with use of dynamic activities for 4 minutes  including:  -Flexbar GREEN simulated towel wringing  and jar opening with ulnar side of hand x 20 repetitions each  -GREEN Theraputty gripping and pulling with flat fist ulnar side of hand    Instruction and demonstration given of all introduced   Patient required moderate cuing for correct performance of exercise.    Home Exercises and Education Provided   Education provided:  - discussed insurance limitation  - Progress towards goals     Written Home Exercises Provided: Patient instructed to cont prior HEP.  Exercises were reviewed and he was able to demonstrate them prior to the end of the session. he demonstrated good  understanding of the HEP provided.   See EMR under Patient Instructions for exercises provided prior visit.     Assessment   Patient appears to be less sensitive to palpation along ulnar aspect of hand and volar little finger. Still with one area near MP crease that causes nerve pain -jumping and shaking of hand. He required quick rests with Velcro exercise board activity. He appeared to tolerate and have better control of flexbar.  Pt  will continue to benefit from skilled OT to further address pain and deficits in ROM and strength which are hindering ability to perform self care and IADLs. Patient's cultural, spiritual, and educational needs were considered.    Goals:  Short Term Goals  Independent in home program of ROM in 2 visits-met  Patient with full use of right hand for all self care in 2 weeks-met  Patient able to open screw top containers with no difficulty in 3 weeks-met  Patient with decrease in pain to 1/10 at surgical site and full wrist ROM in 3 weeks-ongoing  Long Term Goals  Patient with no pain at surgery site and increase in  fingers flexion to full fist in 5 weeks-met  Patient able to grasp and stabilize objects with right in 6 weeks-ongoing  Patient able to carry and lift up to 8# with the right hand in 6 weeks-ongoing  Improve FOTO score by 12 points indicating improved function of right hand in 6 weeks-ongoing  Plan   Continue skilled therapy 2 times a week for 2 weeks  including therapeutic exercises and activities, manual therapy, and pain modalities  as needed    NELY Hines

## 2023-06-06 ENCOUNTER — CLINICAL SUPPORT (OUTPATIENT)
Dept: REHABILITATION | Facility: HOSPITAL | Age: 68
End: 2023-06-06
Payer: MEDICARE

## 2023-06-06 DIAGNOSIS — Z98.890 HX OF HAND SURGERY: Primary | ICD-10-CM

## 2023-06-06 PROCEDURE — 97110 THERAPEUTIC EXERCISES: CPT | Mod: PN

## 2023-06-06 PROCEDURE — 97022 WHIRLPOOL THERAPY: CPT | Mod: PN

## 2023-06-06 PROCEDURE — 97530 THERAPEUTIC ACTIVITIES: CPT | Mod: PN

## 2023-06-06 NOTE — PROGRESS NOTES
[facility]   Date: 6/6/2023  Name: Aleksandar Knight Sr.  Clinic Number: [id number]  Therapy Diagnosis: right hand pain  Physician: Gregor Boucher MD  Physician Orders: eval and treat carpal tunnel protocol  Medical Diagnosis: Guyon's canal release  Surgical Procedure and Date: Carpal tunnel and Guyon's canal release on 3/01/23  Evaluation Date: 3/15/2023  Insurance Authorization Period Expiration: 6/14/2023  Plan of Care Certification Period: 6/30/23  Date of Return to MD: 6/15/23  Visit # / Visits authorized: 18/24 including eval  Time In:6:45  Time Out: 7:30  Total Billable Time:45 minutes  Precautions:   Standard        Subjective   Pt reports: Hand been doing pretty good.  The little finger is sore this morning. Little finger cramping and burning sensation still at CT scar.    was compliant with home exercise program given    Response to previous treatment: unremarkable  Functional change: none noted  Pain:0/10;  2/10 at worst -sharp pains in the little finger  Location: right hand      Objective   Patient received the following treatment:   Supervised modalities after being cleared for contradictions for 10 minutes including:      Fluidotherapy to right hand for 10 minutes to increase tissue elasticity, desensitization, sensory re-education and for pain management. Air flow 80 heat 112 degrees with Active range of motion in it    Manual therapy techniques to increase joint mobilization and soft tissue mobilization for 5 minutes including:    -Scar massage to right hand for 5 minutes  to decrease dense adhesions and improve tensile glide.   Therapeutic exercises to improve functional performance while increasing strength, endurance, ROM,  and flexibility for 22 minutes, including:   -Active assistive/Passive range of motion wrist extension, deviation 10 repetitions each  -Active assistive/Passive range of motion of fingers each finger then  "composite and place and hold in a fist  -Passive stretch of little and ring into extension holding 10 seconds x 5 repetitions   -Gripper at 55# 10 repetitions 50# x 15 repetitions 35#  20 repetitions   - Velcro exercise board rolling of dowel with handle x 7 repetitions forward and backward  Therapeutic activities to improve functional performance with use of dynamic activities for 8 minutes  including:  -Flexbar GREEN simulated towel wringing  and jar opening with ulnar side of hand x 20 repetitions each  -GREEN theraputty holding 1" and 1/2" dowel with ulnar side of hand depressing into putty  -GREEN Theraputty gripping and pulling with flat fist ulnar side of hand    Instruction and demonstration given of all introduced   Patient required moderate cuing for correct performance of exercise.    Home Exercises and Education Provided   Education provided:  - discussed insurance limitation  - Progress towards goals     Written Home Exercises Provided: Patient instructed to cont prior HEP.  Exercises were reviewed and he was able to demonstrate them prior to the end of the session. he demonstrated good  understanding of the HEP provided.   See EMR under Patient Instructions for exercises provided prior visit.     Assessment   Patient noting worst level of pain decreasing, now at ~ 2/10. His hand did tremble a few times with massage at base of little finger. Several times required quick rests due to cramping at little finger. Pt  will continue to benefit from skilled OT to further address pain and deficits in ROM and strength which are hindering ability to perform self care and IADLs. Patient's cultural, spiritual, and educational needs were considered.    Goals:  Short Term Goals  Independent in home program of ROM in 2 visits-met  Patient with full use of right hand for all self care in 2 weeks-met  Patient able to open screw top containers with no difficulty in 3 weeks-met  Patient with decrease in pain to 1/10 at " surgical site and full wrist ROM in 3 weeks-ongoing  Long Term Goals  Patient with no pain at surgery site and increase in fingers flexion to full fist in 5 weeks-met  Patient able to grasp and stabilize objects with right in 6 weeks-ongoing  Patient able to carry and lift up to 8# with the right hand in 6 weeks-ongoing  Improve FOTO score by 12 points indicating improved function of right hand in 6 weeks-ongoing  Plan   Continue skilled therapy 2 times a week for 2 weeks  including therapeutic exercises and activities, manual therapy, and pain modalities  as needed    NELY Hines

## 2023-06-08 ENCOUNTER — CLINICAL SUPPORT (OUTPATIENT)
Dept: REHABILITATION | Facility: HOSPITAL | Age: 68
End: 2023-06-08
Payer: MEDICARE

## 2023-06-08 DIAGNOSIS — Z98.890 HX OF HAND SURGERY: Primary | ICD-10-CM

## 2023-06-08 PROCEDURE — 97022 WHIRLPOOL THERAPY: CPT | Mod: PN

## 2023-06-08 PROCEDURE — 97110 THERAPEUTIC EXERCISES: CPT | Mod: PN

## 2023-06-08 PROCEDURE — 97530 THERAPEUTIC ACTIVITIES: CPT | Mod: PN

## 2023-06-08 NOTE — PROGRESS NOTES
[facility]   Date: 6/8/2023  Name: Aleksandar Knight Sr.  Clinic Number: [id number]  Therapy Diagnosis: right hand pain  Physician: Gregor Boucher MD  Physician Orders: eval and treat carpal tunnel protocol  Medical Diagnosis: Guyon's canal release  Surgical Procedure and Date: Carpal tunnel and Guyon's canal release on 3/01/23  Evaluation Date: 3/15/2023  Insurance Authorization Period Expiration: 6/14/2023  Plan of Care Certification Period: 6/30/23  Date of Return to MD: 6/15/23  Visit # / Visits authorized: 19/24 including eval  Time In:6:40  Time Out: 7:28  Total Billable Time:48 minutes  Precautions:   Standard        Subjective   Pt reports:  Haven't slept. I used my hand with no problem on Tues and Wed. Wore a glove used a hammer, behzad and helped take apart a deck. Tues night it was a little sore but last night it started with the sharp pain up to a 8/10 again. Took 3 Ibuprofen and finally started to feel better.    was compliant with home exercise program given    Response to previous treatment: unremarkable  Functional change: none noted  Pain:   Location: right hand      Objective   Patient received the following treatment:   Supervised modalities after being cleared for contradictions for 10 minutes including:      Fluidotherapy to right hand for 10 minutes to increase tissue elasticity, desensitization, sensory re-education and for pain management. Air flow 80 heat 112 degrees with Active range of motion in it    Manual therapy techniques to increase joint mobilization and soft tissue mobilization for 5 minutes including:    -Scar massage to right hand for 5 minutes  to decrease dense adhesions and improve tensile glide.   Therapeutic exercises to improve functional performance while increasing strength, endurance, ROM,  and flexibility for 25 minutes, including:   -Active assistive/Passive range of motion wrist extension, deviation 10  repetitions each  -Active assistive/Passive range of motion of fingers each finger then composite and place and hold in a fist  -Passive stretch of little and ring into extension holding 10 seconds x 5 repetitions   -Gripper at 50# x 15 repetitions 35#  20 repetitions   -3# wrist flexion, extension, and deviation 25 repetitions each  - Velcro exercise board rolling of dowel with handle x 8 repetitions forward and backward  Therapeutic activities to improve functional performance with use of dynamic activities for 8 minutes  including:  -GREEN putty grasping with ulnar side of hand to palm and pulling  --GREEN Theraputty gripping and pulling with flat fist ulnar side of hand    -Flexbar GREEN simulated towel wringing  and jar opening with ulnar side of hand x 20 repetitions each    Instruction and demonstration given of all introduced   Patient required moderate cuing for correct performance of exercise.    Home Exercises and Education Provided   Education provided:  - discussed insurance limitation  - Progress towards goals     Written Home Exercises Provided: Patient instructed to cont prior HEP.  Exercises were reviewed and he was able to demonstrate them prior to the end of the session. he demonstrated good  understanding of the HEP provided.   See EMR under Patient Instructions for exercises provided prior visit.     Assessment   Patient noting throbbing sharp pain up to 8/10 last night post 2 days of heavy work. He had no difficulty while performing activities. He required decreasing resistance of activities and exercises during session due to little finger pain. He did note that Ibuprofen and therapy did help to decrease symptoms.  Pt  will continue to benefit from skilled OT to further address pain and deficits in ROM and strength which are hindering ability to perform self care and IADLs. Patient's cultural, spiritual, and educational needs were considered.    Goals:  Short Term Goals  Independent in home  program of ROM in 2 visits-met  Patient with full use of right hand for all self care in 2 weeks-met  Patient able to open screw top containers with no difficulty in 3 weeks-met  Patient with decrease in pain to 1/10 at surgical site and full wrist ROM in 3 weeks-ongoing  Long Term Goals  Patient with no pain at surgery site and increase in fingers flexion to full fist in 5 weeks-met  Patient able to grasp and stabilize objects with right in 6 weeks-ongoing  Patient able to carry and lift up to 8# with the right hand in 6 weeks-ongoing  Improve FOTO score by 12 points indicating improved function of right hand in 6 weeks-ongoing  Plan   Continue skilled therapy 2 times a week for 1 week  including therapeutic exercises and activities, manual therapy, and pain modalities  as needed    NELY Hines

## 2023-06-13 ENCOUNTER — CLINICAL SUPPORT (OUTPATIENT)
Dept: REHABILITATION | Facility: HOSPITAL | Age: 68
End: 2023-06-13
Payer: MEDICARE

## 2023-06-13 DIAGNOSIS — Z98.890 HX OF HAND SURGERY: Primary | ICD-10-CM

## 2023-06-13 PROCEDURE — 97022 WHIRLPOOL THERAPY: CPT | Mod: KX,PN

## 2023-06-13 PROCEDURE — 97110 THERAPEUTIC EXERCISES: CPT | Mod: KX,PN

## 2023-06-13 PROCEDURE — 97140 MANUAL THERAPY 1/> REGIONS: CPT | Mod: KX,PN

## 2023-06-13 NOTE — PROGRESS NOTES
[facility] Daily [facility]   Date: 6/13/2023  Name: Aleksandar Knight Sr.  Clinic Number: [id number]  Therapy Diagnosis: right hand pain  Physician: Gregor Boucher MD  Physician Orders: eval and treat carpal tunnel protocol  Medical Diagnosis: Guyon's canal release  Surgical Procedure and Date: Carpal tunnel and Guyon's canal release on 3/01/23  Evaluation Date: 3/15/2023  Insurance Authorization Period Expiration: 6/14/2023  Plan of Care Certification Period: 6/30/23  Date of Return to MD: 6/15/23  Visit # / Visits authorized: 20/24 including eval  Time In: 7:10  Time Out: 8:10  Total Billable Time:60 minutes  Precautions:   Standard        Subjective   Pt reports: Been taking it a little easy. Doing okay now.      was compliant with home exercise program given    Response to previous treatment: unremarkable  Functional change: none noted  Pain:   average  in past few days 2/10 dull ache  Location: right hand      Objective   Wrist - LEFT                 6/13/23       5/13/23    3/15/2023                  Motion A/PROM A/PROM    A/PROM    Flexion 50 45/NT 30/45   Extension 40/45 35/45 30/50   Ulnar Deviation 20/25 20/NT 20/30   Radial Deviation 20/NT 20 NT 20/NT      HAND AROM   Extension-Flexion     RIGHT                                                                 3/29/2023         INDEX   LONG    RING    LITTLE                          MCP   0-75      0-65       10-75    0-70                 PIP     0-95      0-90       10-90    20-95/10 passive                  DIP     0-50     0-60        0-55       0-70    HAND AROM  Extension-Flexion     RIGHT                                                         5/13/2023       INDEX   LONG    RING    LITTLE                MCP   0-75      0-65       10-65    0-65       PIP     0-95      0-90       10-95    15-95/10 passive       DIP     0-55     0-65        0-60       0-70  6/13/2023        INDEX   LONG    RING    LITTLE              MCP   0-70      0-65       10-60    0-70          PIP     0-95      0-90       0-95    20-95/0 passive                      DIP     0-55     0-60        0-60       0-65     Hand -  Strength  Carl dynamometer 2nd Rung in lbs;  Pinches:pinch gauge average of 2 trials in psi    6/13/23 5/09/23 4/27/23 3/27/23   Position Right Right Right Right   Hand : Trial 1 49 43 40 35   Hand : Trial 2 51 45 40 31   Hand : Trial 3 52 46 40 31    Average 50 44 40 32     Patient received the following treatment:   Supervised modalities after being cleared for contradictions for 10 minutes including:      Fluidotherapy to right hand for 10 minutes to increase tissue elasticity, desensitization, sensory re-education and for pain management. Air flow 80 heat 112 degrees with Active range of motion in it    Manual therapy techniques to increase joint mobilization and soft tissue mobilization for 8 minutes including:    -Scar massage to right hand for 8 minutes  to decrease dense adhesions and improve tensile glide.   Therapeutic exercises to improve functional performance while increasing strength, endurance, ROM,  and flexibility for 39 minutes, including:   -Active assistive/Passive range of motion wrist extension, deviation 10 repetitions each  -Active assistive/Passive range of motion of fingers each finger then composite and place and hold in a fist  -Passive stretch of little and ring into extension holding 10 seconds x 5 repetitions   -Gripper at 50# x 15 repetitions 35#  20 repetitions   - Velcro exercise board rolling of dowel with handle x 8 repetitions forward and backward  Therapeutic activities to improve functional performance with use of dynamic activities for 3 minutes  including:  -Flexbar GREEN simulated towel wringing  and jar opening with ulnar side of hand x 20 repetitions each    Instruction and demonstration given of all introduced   Patient required moderate cuing for correct  performance of exercise.    Home Exercises and Education Provided   Education provided:  - discussed insurance limitation  - Progress towards goals     Written Home Exercises Provided: Patient instructed to cont prior HEP.  Exercises were reviewed and he was able to demonstrate them prior to the end of the session. he demonstrated good  understanding of the HEP provided.   See EMR under Patient Instructions for exercises provided prior visit.     Assessment   Patient noting throbbing sharp pain has resolved with a decrease in heavy activity. His Active range of motion is with no significant change in range of motion. His  strength has improved by 6#. up to 8/10 last night post 2 days of heavy work. He demonstrated a few episodes of shaking in his hand during scar massage.  Pt  will continue to benefit from skilled OT to further address pain and deficits in ROM and strength which are hindering ability to perform self care and IADLs. Patient's cultural, spiritual, and educational needs were considered.    Goals:  Short Term Goals  Independent in home program of ROM in 2 visits-met  Patient with full use of right hand for all self care in 2 weeks-met  Patient able to open screw top containers with no difficulty in 3 weeks-met  Patient with decrease in pain to 1/10 at surgical site and full wrist ROM in 3 weeks-ongoing  Long Term Goals  Patient with no pain at surgery site and increase in fingers flexion to full fist in 5 weeks-met  Patient able to grasp and stabilize objects with right in 6 weeks-ongoing  Patient able to carry and lift up to 8# with the right hand in 6 weeks-ongoing  Improve FOTO score by 12 points indicating improved function of right hand in 6 weeks-ongoing  Plan   Continue skilled therapy 2 times a week for 2 week  including therapeutic exercises and activities, manual therapy, and pain modalities  as needed    NELY Hines

## 2023-06-14 ENCOUNTER — DOCUMENTATION ONLY (OUTPATIENT)
Dept: REHABILITATION | Facility: HOSPITAL | Age: 68
End: 2023-06-14

## 2023-06-14 ENCOUNTER — CLINICAL SUPPORT (OUTPATIENT)
Dept: REHABILITATION | Facility: HOSPITAL | Age: 68
End: 2023-06-14
Payer: MEDICARE

## 2023-06-14 DIAGNOSIS — Z98.890 HX OF HAND SURGERY: Primary | ICD-10-CM

## 2023-06-14 PROCEDURE — 97022 WHIRLPOOL THERAPY: CPT | Mod: KX,PN

## 2023-06-14 PROCEDURE — 97530 THERAPEUTIC ACTIVITIES: CPT | Mod: KX,PN

## 2023-06-14 PROCEDURE — 97110 THERAPEUTIC EXERCISES: CPT | Mod: KX,PN

## 2023-06-14 NOTE — PROGRESS NOTES
[facility] Daily Treatment [facility]   Date: 6/14/2023  Name: Aleksandar Knight Sr.  Clinic Number: [id number]  Therapy Diagnosis: right hand pain  Physician: Gregor Boucher MD  Physician Orders: eval and treat carpal tunnel protocol  Medical Diagnosis: Guyon's canal release  Surgical Procedure and Date: Carpal tunnel and Guyon's canal release on 3/01/23  Evaluation Date: 3/15/2023  Insurance Authorization Period Expiration: 6/14/2023  Plan of Care Certification Period: 6/30/23  Date of Return to MD: 6/15/23  Visit # / Visits authorized: 21/24 including eval  Time In: 6:50  Time Out: 7:45  Total Billable Time: 55 minutes  Precautions:   Standard        Subjective   Pt reports: Was a little sore after therapy yesterday.   was compliant with home exercise program given    Response to previous treatment: unremarkable  Functional change: none noted  Pain:   average  in past few days 2/10 dull ache  Location: right hand      Objective   Patient received the following treatment:   Supervised modalities after being cleared for contradictions for 10 minutes including:      Fluidotherapy to right hand for 10 minutes to increase tissue elasticity, desensitization, sensory re-education and for pain management. Air flow 80 heat 112 degrees with Active range of motion in it    Manual therapy techniques to increase joint mobilization and soft tissue mobilization for 5 minutes including:    -Scar massage to right hand for 5 minutes  to decrease dense adhesions and improve tensile glide.   Therapeutic exercises to improve functional performance while increasing strength, endurance, ROM,  and flexibility for 22 minutes, including:   -Active assistive/Passive range of motion wrist extension, deviation 10 repetitions each  -Active assistive/Passive range of motion of fingers each finger then composite and place and hold in a fist  -Passive stretch of little and ring into extension holding 10  seconds x 5 repetitions   -Gripper at 50# x 15 repetitions 35#  20 repetitions   - Velcro exercise board rolling of dowel with handle x 8 repetitions forward and backward  -dowel with string with 5# rolled up 5 repetitions   Therapeutic activities to improve functional performance with use of dynamic activities for 13 minutes  including:  -Flexbar GREEN simulated towel wringing  and jar opening with ulnar side of hand x 20 repetitions each  -GREEN putty grasping with ulnar side of hand to palm and pulling  --GREEN Theraputty gripping and pulling with flat fist ulnar side of hand    Instruction and demonstration given of all introduced   Patient required moderate cuing for correct performance of exercise.    Home Exercises and Education Provided   Education provided:  - discussed insurance limitation  - Progress towards goals     Written Home Exercises Provided: Patient instructed to cont prior HEP.  Exercises were reviewed and he was able to demonstrate them prior to the end of the session. he demonstrated good  understanding of the HEP provided.   See EMR under Patient Instructions for exercises provided prior visit.     Assessment   Patient noting no increase in pain with daily activities. He performed all exercise with minimal increase in symptoms at little finger,noted burning with flexbar and Velcro exercise board activities. Pt  will continue to benefit from skilled OT to further address pain and deficits in ROM and strength which are hindering ability to perform self care and IADLs. Patient's cultural, spiritual, and educational needs were considered.  Goals:  Short Term Goals  Independent in home program of ROM in 2 visits-met  Patient with full use of right hand for all self care in 2 weeks-met  Patient able to open screw top containers with no difficulty in 3 weeks-met  Patient with decrease in pain to 1/10 at surgical site and full wrist ROM in 3 weeks-ongoing  Long Term Goals  Patient with no pain at  surgery site and increase in fingers flexion to full fist in 5 weeks-met  Patient able to grasp and stabilize objects with right in 6 weeks-ongoing  Patient able to carry and lift up to 8# with the right hand in 6 weeks-ongoing  Improve FOTO score by 12 points indicating improved function of right hand in 6 weeks-ongoing  Plan   Continue skilled therapy 2 times a week for 2 week  including therapeutic exercises and activities, manual therapy, and pain modalities  as needed    NELY Hines

## 2023-06-14 NOTE — PROGRESS NOTES
[facility] Occupational Therapy Daily Treatment Note   Date: 6/14/2023  Name: Aleksandar Knight Sr.  Clinic Number: [id number]  Therapy Diagnosis: right hand pain  Physician: Gregor Boucher MD  Subjective   Pt reports: Able to do everything. Hurts after if use it to do a lot of tight gripping and lifting and pulling.    Pain:   average  in past few days 2/10 dull ache   Objective   Wrist - LEFT                 6/13/23       5/13/23    3/15/2023                  Motion A/PROM A/PROM    A/PROM    Flexion 50 45/NT 30/45   Extension 40/45 35/45 30/50   Ulnar Deviation 20/25 20/NT 20/30   Radial Deviation 20/NT 20 NT 20/NT      HAND AROM   Extension-Flexion     RIGHT                                                                 3/29/2023         INDEX   LONG    RING    LITTLE                          MCP   0-75      0-65       10-75    0-70                 PIP     0-95      0-90       10-90    20-95/10 passive                  DIP     0-50     0-60        0-55       0-70     HAND AROM  Extension-Flexion     RIGHT                                                         5/13/2023       INDEX   LONG    RING    LITTLE                MCP   0-75      0-65       10-65    0-65       PIP     0-95      0-90       10-95    15-95/10 passive       DIP     0-55     0-65        0-60       0-70  6/13/2023        INDEX   LONG    RING    LITTLE             MCP   0-70      0-65       10-60    0-70          PIP     0-95      0-90       0-95    20-95/0 passive                      DIP     0-55     0-60        0-60       0-65      Hand -  Strength  Carl dynamometer 2nd Rung in lbs;  Pinches:pinch gauge average of 2 trials in psi    6/13/23 5/09/23 4/27/23 3/27/23   Position Right Right Right Right   Hand : Trial 1 49 43 40 35   Hand : Trial 2 51 45 40 31   Hand : Trial 3 52 46 40 31    Average 50 44 40 32    LEFT  80#  Assessment   Patient pain level on average 2/10. He recently experienced night  time throbbing, sharp pain post use for heavy activity which resolved in a few days. His Active range of motion is with no significant change in range of motion which is grossly WNL. His  strength has improved by another 6#; which is 62% of the left. He experiences a few episodes of shaking in his hand during scar massage.  He has resumed all daily activities.   Goals:  Short Term Goals  Independent in home program of ROM in 2 visits-met  Patient with full use of right hand for all self care in 2 weeks-met  Patient able to open screw top containers with no difficulty in 3 weeks-met  Patient with decrease in pain to 1/10 at surgical site and full wrist ROM in 3 weeks-partially met  Long Term Goals  Patient with no pain at surgery site and increase in fingers flexion to full fist in 5 weeks-met  Patient able to grasp and stabilize objects with right in 6 weeks-partially met  Patient able to carry and lift up to 8# with the right hand in 6 weeks-met  Improve FOTO score by 12 points indicating improved function of right hand in 6 weeks-ongoing  Plan   Will complete 4 more visits then discharge.   NELY Hines

## 2023-06-20 ENCOUNTER — CLINICAL SUPPORT (OUTPATIENT)
Dept: REHABILITATION | Facility: HOSPITAL | Age: 68
End: 2023-06-20
Payer: MEDICARE

## 2023-06-20 DIAGNOSIS — Z98.890 HX OF HAND SURGERY: Primary | ICD-10-CM

## 2023-06-20 PROCEDURE — 97110 THERAPEUTIC EXERCISES: CPT | Mod: KX,PN

## 2023-06-20 PROCEDURE — 97530 THERAPEUTIC ACTIVITIES: CPT | Mod: KX,PN

## 2023-06-20 PROCEDURE — 97022 WHIRLPOOL THERAPY: CPT | Mod: KX,PN

## 2023-06-20 NOTE — PROGRESS NOTES
[facility]   Date: 6/20/2023  Name: Aleksandar Knight Sr.  Clinic Number: [id number]  Therapy Diagnosis: right hand pain  Physician: Gregor Boucher MD  Physician Orders: eval and treat carpal tunnel protocol  Medical Diagnosis: Guyon's canal release  Surgical Procedure and Date: Carpal tunnel and Guyon's canal release on 3/01/23  Evaluation Date: 3/15/2023  Insurance Authorization Period Expiration: 6/14/2023  Plan of Care Certification Period: 6/30/23  Date of Return to MD: 6/15/23  Visit # / Visits authorized: 22/24 including eval  Time In: 6:45  Time Out: 7:40  Total Billable Time: 55 minutes  Precautions:   Standard        Subjective   Pt reports: Saw the doctor told him I it was getting better slowly. Don't want another surgery. Going to finish out my last few visits.   was compliant with home exercise program given    Response to previous treatment: unremarkable  Functional change: none noted  Pain:   average  in past few days 2/10 dull ache  Location: right hand      Objective   Patient received the following treatment:   Supervised modalities after being cleared for contradictions for 10 minutes including:      Fluidotherapy to right hand for 10 minutes to increase tissue elasticity, desensitization, sensory re-education and for pain management. Air flow 80 heat 112 degrees with Active range of motion in it    Manual therapy techniques to increase joint mobilization and soft tissue mobilization for 5 minutes including:    -Scar massage to right hand for 5 minutes  to decrease dense adhesions and improve tensile glide.   Therapeutic exercises to improve functional performance while increasing strength, endurance, ROM,  and flexibility for 17 minutes, including:   -Active assistive/Passive range of motion wrist extension, deviation 10 repetitions each  -Active assistive/Passive range of motion of fingers each finger then composite and place and  "hold in a fist  -Passive stretch of little and ring into extension holding 10 seconds x 5 repetitions   -Gripper at 50# x 15 repetitions 35#  20 repetitions   - Velcro exercise board rolling of dowel with handle x 8 repetitions forward and backward    Therapeutic activities to improve functional performance with use of dynamic activities for 23 minutes  including:  -GREEN theraputty holding 1" and 1/2" dowel with ulnar side of hand depressing into putty  -Flexbar GREEN simulated towel wringing  and jar opening with ulnar side of hand x 20 repetitions each  -GREEN putty grasping with ulnar side of hand to palm and pulling  --GREEN Theraputty gripping and pulling with flat fist ulnar side of hand    Instruction and demonstration given of all introduced   Patient required moderate cuing for correct performance of exercise.    Home Exercises and Education Provided   Education provided:  - discussed insurance limitation  - Progress towards goals     Written Home Exercises Provided: Patient instructed to cont prior HEP.  Exercises were reviewed and he was able to demonstrate them prior to the end of the session. he demonstrated good  understanding of the HEP provided.   See EMR under Patient Instructions for exercises provided prior visit.     Assessment   Patient's hand shaking several times with scar massage and passive extension stretch of little finger. He performed resistive exercise with minimal increase in discomfort at little finger.   Pt  will continue to benefit from skilled OT to further address pain and deficits in ROM and strength which are hindering ability to perform self care and IADLs. Patient's cultural, spiritual, and educational needs were considered.  Goals:  Short Term Goals  Independent in home program of ROM in 2 visits-met  Patient with full use of right hand for all self care in 2 weeks-met  Patient able to open screw top containers with no difficulty in 3 weeks-met  Patient with decrease in pain " to 1/10 at surgical site and full wrist ROM in 3 weeks-ongoing  Long Term Goals  Patient with no pain at surgery site and increase in fingers flexion to full fist in 5 weeks-met  Patient able to grasp and stabilize objects with right in 6 weeks-ongoing  Patient able to carry and lift up to 8# with the right hand in 6 weeks-ongoing  Improve FOTO score by 12 points indicating improved function of right hand in 6 weeks-ongoing  Plan   Continue skilled therapy 2 times a week for 2 week  including therapeutic exercises and activities, manual therapy, and pain modalities  as needed    NELY Hines

## 2023-06-22 ENCOUNTER — CLINICAL SUPPORT (OUTPATIENT)
Dept: REHABILITATION | Facility: HOSPITAL | Age: 68
End: 2023-06-22
Payer: MEDICARE

## 2023-06-22 DIAGNOSIS — Z98.890 HX OF HAND SURGERY: Primary | ICD-10-CM

## 2023-06-22 PROCEDURE — 97022 WHIRLPOOL THERAPY: CPT | Mod: PN

## 2023-06-22 PROCEDURE — 97110 THERAPEUTIC EXERCISES: CPT | Mod: PN

## 2023-06-22 NOTE — PROGRESS NOTES
[facility]   Date: 6/22/2023  Name: Aleksandar Knight Sr.  Clinic Number: [id number]  Therapy Diagnosis: right hand pain  Physician: Gregor Boucher MD  Physician Orders: eval and treat carpal tunnel protocol  Medical Diagnosis: Guyon's canal release  Surgical Procedure and Date: Carpal tunnel and Guyon's canal release on 3/01/23  Evaluation Date: 3/15/2023  Insurance Authorization Period Expiration: 6/14/2023  Plan of Care Certification Period: 6/30/23  Date of Return to MD: 6/15/23  Visit # / Visits authorized: 23/24 including eval  Time In: 6:45  Time Out: 7:30  Total Billable Time: 45 minutes  Precautions:   Standard        Subjective   Pt reports: Using hand as normally. No heavy use.   was compliant with home exercise program given    Response to previous treatment: unremarkable  Functional change: none noted  Pain:   average  in past few days 2/10 dull ache  Location: right hand      Objective   Patient received the following treatment:   Supervised modalities after being cleared for contradictions for 10 minutes including:      Fluidotherapy to right hand for 10 minutes to increase tissue elasticity, desensitization, sensory re-education and for pain management. Air flow 80 heat 112 degrees with Active range of motion in it    Manual therapy techniques to increase joint mobilization and soft tissue mobilization for 5 minutes including:    -Scar massage to right hand for 5 minutes  to decrease dense adhesions and improve tensile glide.   Therapeutic exercises to improve functional performance while increasing strength, endurance, ROM,  and flexibility for 27 minutes, including:   -Active assistive/Passive range of motion wrist extension, deviation 10 repetitions each  -Active assistive/Passive range of motion of fingers each finger then composite and place and hold in a fist  -Passive stretch of little and ring into extension holding 10 seconds x  5 repetitions   -Gripper at 50# x 15 repetitions 35#  20 repetitions   - Velcro exercise board rolling of dowel with handle x 8 repetitions forward and backward    Therapeutic activities to improve functional performance with use of dynamic activities for 3 minutes  including:  -Flexbar GREEN simulated towel wringing  and jar opening with ulnar side of hand x 20 repetitions each     Instruction and demonstration given of all introduced   Patient required moderate cuing for correct performance of exercise.    Home Exercises and Education Provided   Education provided:  - discussed insurance limitation  - Progress towards goals     Written Home Exercises Provided: Patient instructed to cont prior HEP.  Exercises were reviewed and he was able to demonstrate them prior to the end of the session. he demonstrated good  understanding of the HEP provided.   See EMR under Patient Instructions for exercises provided prior visit.     Assessment   Patient continues with low level of pain. He performed all exercise.with minimal discomfort. He required taking several quick rests to be able to complete Velcro exercise board.   Pt  will continue to benefit from skilled OT to further address pain and deficits in ROM and strength which are hindering ability to perform self care and IADLs. Patient's cultural, spiritual, and educational needs were considered.  Goals:  Short Term Goals  Independent in home program of ROM in 2 visits-met  Patient with full use of right hand for all self care in 2 weeks-met  Patient able to open screw top containers with no difficulty in 3 weeks-met  Patient with decrease in pain to 1/10 at surgical site and full wrist ROM in 3 weeks-ongoing  Long Term Goals  Patient with no pain at surgery site and increase in fingers flexion to full fist in 5 weeks-met  Patient able to grasp and stabilize objects with right in 6 weeks-ongoing  Patient able to carry and lift up to 8# with the right hand in 6  weeks-ongoing  Improve FOTO score by 12 points indicating improved function of right hand in 6 weeks-ongoing  Plan   Continue skilled therapy 2 times a week for 1 week  including therapeutic exercises and activities, manual therapy, and pain modalities  as needed    NELY Hines

## 2023-06-27 ENCOUNTER — CLINICAL SUPPORT (OUTPATIENT)
Dept: REHABILITATION | Facility: HOSPITAL | Age: 68
End: 2023-06-27
Payer: MEDICARE

## 2023-06-27 DIAGNOSIS — Z98.890 HX OF HAND SURGERY: Primary | ICD-10-CM

## 2023-06-27 PROCEDURE — 97530 THERAPEUTIC ACTIVITIES: CPT | Mod: KX,PN

## 2023-06-27 PROCEDURE — 97022 WHIRLPOOL THERAPY: CPT | Mod: KX,PN

## 2023-06-27 PROCEDURE — 97110 THERAPEUTIC EXERCISES: CPT | Mod: KX,PN

## 2023-06-27 NOTE — PROGRESS NOTES
[facility]   Date: 6/27/2023  Name: Aleksandar Knight Sr.  Clinic Number: [id number]  Therapy Diagnosis: right hand pain  Physician: Gregor Boucher MD  Physician Orders: eval and treat carpal tunnel protocol  Medical Diagnosis: Guyon's canal release  Surgical Procedure and Date: Carpal tunnel and Guyon's canal release on 3/01/23  Evaluation Date: 3/15/2023  Insurance Authorization Period Expiration: 8/15/2023  Plan of Care Certification Period: 6/30/23  Date of Return to MD: 6/15/23  Visit # / Visits authorized: 24/45 including eval  Time In: 7:35  Time Out: 8:15  Total Billable Time: 40 minutes  Precautions:   Standard        Subjective   Pt reports: Shoveled dirt for a while Sunday. Had those jerking sharp pains several times Sunday and Monday night.  Pain up to a 4-5/10 not as strong as before.    was compliant with home exercise program given    Response to previous treatment: unremarkable  Functional change: as above  Pain:   as above;  no pain today at rest  Location: right hand      Objective   Patient received the following treatment:   Supervised modalities after being cleared for contradictions for 10 minutes including:      Fluidotherapy to right hand for 10 minutes to increase tissue elasticity, desensitization, sensory re-education and for pain management. Air flow 80 heat 112 degrees with Active range of motion in it    Manual therapy techniques to increase joint mobilization and soft tissue mobilization for 5 minutes including:    -Scar massage to right hand for 5 minutes  to decrease dense adhesions and improve tensile glide.   Therapeutic exercises to improve functional performance while increasing strength, endurance, ROM,  and flexibility for 17 minutes, including:   -Active assistive/Passive range of motion wrist extension, deviation 10 repetitions each  -Active assistive/Passive range of motion of fingers each finger then composite  "and place and hold in a fist  -Passive stretch of little and ring into extension holding 10 seconds x 5 repetitions   - Velcro exercise board rolling of dowel with handle x 8 repetitions forward and backward    Therapeutic activities to improve functional performance with use of dynamic activities for 8 minutes  including:  -Flexbar BLUE (increased by 1 resistive level) simulated towel wringing  and jar opening with ulnar side of hand x 20 repetitions each  -GREEN theraputty holding 1" and 1/2" dowel with ulnar side of hand depressing into putty   -GREEN putty "cookie cutting" with 1" cylinder   Instruction and demonstration given of all introduced   Patient required moderate cuing for correct performance of exercise.    Home Exercises and Education Provided   Education provided:  - discussed insurance limitation  - Progress towards goals     Written Home Exercises Provided: Patient instructed to cont prior HEP.  Exercises were reviewed and he was able to demonstrate them prior to the end of the session. he demonstrated good  understanding of the HEP provided.   See EMR under Patient Instructions for exercises provided prior visit.     Assessment   Patient noting return of jerking and shocking in the hand post heavy use of hand, however not to as high intensity as previously. He experienced a few jerking shocking pain as performing massage and stretches. He performed exercise with minimal increase in symptoms and increase in effort with more resistive flexbar.   Pt  will continue to benefit from skilled OT to further address pain and deficits in ROM and strength which are hindering ability to perform self care and IADLs. Patient's cultural, spiritual, and educational needs were considered.  Goals:  Short Term Goals  Independent in home program of ROM in 2 visits-met  Patient with full use of right hand for all self care in 2 weeks-met  Patient able to open screw top containers with no difficulty in 3 " weeks-met  Patient with decrease in pain to 1/10 at surgical site and full wrist ROM in 3 weeks-ongoing  Long Term Goals  Patient with no pain at surgery site and increase in fingers flexion to full fist in 5 weeks-met  Patient able to grasp and stabilize objects with right in 6 weeks-ongoing  Patient able to carry and lift up to 8# with the right hand in 6 weeks-ongoing  Improve FOTO score by 12 points indicating improved function of right hand in 6 weeks-ongoing  Plan    1 visit including therapeutic exercises and activities, manual therapy, and pain modalities  as needed     Next session: Reassess and discharge   NELY Hines

## 2023-06-29 ENCOUNTER — CLINICAL SUPPORT (OUTPATIENT)
Dept: REHABILITATION | Facility: HOSPITAL | Age: 68
End: 2023-06-29
Payer: MEDICARE

## 2023-06-29 DIAGNOSIS — Z98.890 HX OF HAND SURGERY: Primary | ICD-10-CM

## 2023-06-29 PROCEDURE — 97110 THERAPEUTIC EXERCISES: CPT | Mod: PN

## 2023-06-29 PROCEDURE — 97530 THERAPEUTIC ACTIVITIES: CPT | Mod: PN

## 2023-06-29 NOTE — PLAN OF CARE
[facility] Occupational Therapy Daily Treatment/Discharge Note   Date: 6/29/2023  Name: Aleksandar Knight Sr.  Clinic Number: [id number]  Therapy Diagnosis: right hand pain  Physician: Gregor Boucher MD  Physician Orders: eval and treat carpal tunnel protocol  Medical Diagnosis: Guyon's canal release  Surgical Procedure and Date: Carpal tunnel and Guyon's canal release on 3/01/23  Evaluation Date: 3/15/2023  Insurance Authorization Period Expiration: 8/15/2023  Plan of Care Certification Period: 6/30/23  Date of Return to MD: 6/15/23  Visit # / Visits authorized: 25/45 including eval  Time In: 6:40  Time Out: 7:33  Total Billable Time: 53 minutes  Precautions:   Standard        Subjective   Pt reports: I just still have to be careful how I hold onto things. Have to use a glove if going to be doing things outside.   was compliant with home exercise program given    Response to previous treatment: unremarkable  Functional change: as above  Pain: in past 5 days  least 0/10   worst 5/10  Location: right hand    FOTO: Eval 41%   Goal: 62%;   6/29/23 67%    Objective    Hand -  Strength  Carl dynamometer 2nd Rung in lbs    6/29/23 6/13/23 5/09/23 4/27/23 3/27/23   Position Right Right Right Right Right   Hand : Trial 1 60 49 43 40 35   Hand : Trial 2 58 51 45 40 31   Hand : Trial 3 60 52 46 40 31    Average 59 50 44 40 32   Wrist - LEFT                 6/29/23     6/13/23       5/13/23    3/15/2023                  Motion AROM A/PROM A/PROM    A/PROM    Flexion 45 50 45/NT 30/45   Extension 35 40/45 35/45 30/50   Ulnar Deviation 20 20/25 20/NT 20/30   Radial Deviation 20 20/NT 20 NT 20/NT      HAND AROM   Extension-Flexion     RIGHT                                                                 3/29/2023         INDEX   LONG    RING    LITTLE                          MCP   0-75      0-65       10-75    0-70                 PIP     0-95      0-90       10-90    20-95/10  "passive                  DIP     0-50     0-60        0-55       0-70      5/13/2023       INDEX   LONG    RING    LITTLE                MCP   0-75      0-65       10-65    0-65       PIP     0-95      0-90       10-95    15-95/10 passive       DIP     0-55     0-65        0-60       0-70  6/13/2023        INDEX   LONG    RING    LITTLE             MCP   0-70      0-65       10-60    0-70          PIP     0-95      0-90       0-95    20-95/0 passive                      DIP     0-55     0-60        0-60       0-65  6/29/23           INDEX   LONG    RING    LITTLE             MCP   0-70      0-65       10-60    0-65          PIP     0-95      0-90       5-95    15-85/0 passive                      DIP     0-55     0-60        0-60       0-60  Patient received the following treatment:   Manual therapy techniques to increase joint mobilization and soft tissue mobilization for 5 minutes including:    -Scar massage to right hand for 5 minutes  to decrease dense adhesions and improve tensile glide.   Therapeutic exercises to improve functional performance while increasing strength, endurance, ROM,  and flexibility for 40 minutes, including:   -Active assistive/Passive range of motion wrist extension, deviation 10 repetitions each  -Active assistive/Passive range of motion of fingers each finger then composite and place and hold in a fist  -Passive stretch of little and ring into extension holding 10 seconds x 5 repetitions   - Velcro exercise board rolling of dowel with handle x 8 repetitions forward and backward  -Gripper 70#  2 x 10 repetitions 55#   Therapeutic activities to improve functional performance with use of dynamic activities for 8 minutes  including:  -Flexbar BLUE simulated towel wringing  and jar opening with ulnar side of hand x 20 repetitions each  -GREEN theraputty holding 1" and 1/2" dowel with ulnar side of hand depressing into putty     Instruction and demonstration given of all introduced   Patient " required moderate cuing for correct performance of exercise.    Home Exercises and Education Provided   Education provided:  - discussed insurance limitation  - Progress towards goals     Written Home Exercises Provided: Patient instructed to cont prior HEP.  Exercises were reviewed and he was able to demonstrate them prior to the end of the session. he demonstrated good  understanding of the HEP provided.   See EMR under Patient Instructions for exercises provided prior visit.     Assessment   On eval patient's pain range was of 3/10 to 5/10 described as constant ache. Currently 0/10 most of the time to 5/10 only noted post heavy use.  He is no longer tender to palpation at surgical site. Sensitivity to touch at base of little finger at scar from Dupuytren's surgery still present but of lesser intensity.  His AROM improved from moderately limited at wrist and fingers to WNL  His  strength improved by 27#. He has returned to performing all daily activities just cautious with where objects press on little finger scar. His FOTO score improved from a 41% to a 67%; 5% above predicted goal. Patient's cultural, spiritual, and educational needs were considered.  Goals:  Short Term Goals  Independent in home program of ROM in 2 visits-met  Patient with full use of right hand for all self care in 2 weeks-met  Patient able to open screw top containers with no difficulty in 3 weeks-met  Patient with decrease in pain to 1/10 at surgical site and full wrist ROM in 3 weeks-met  Long Term Goals  Patient with no pain at surgery site and increase in fingers flexion to full fist in 5 weeks-met  Patient able to grasp and stabilize objects with right in 6 weeks-Met  Patient able to carry and lift up to 8# with the right hand in 6 weeks-Met  Improve FOTO score by 12 points indicating improved function of right hand in 6 weeks-Met  Plan   Discharge from skilled OT. All goals met.   NELY Hines

## 2024-05-03 ENCOUNTER — TELEPHONE (OUTPATIENT)
Dept: FAMILY MEDICINE | Facility: CLINIC | Age: 69
End: 2024-05-03
Payer: MEDICARE

## 2024-05-03 DIAGNOSIS — Z79.899 ENCOUNTER FOR LONG-TERM (CURRENT) USE OF OTHER MEDICATIONS: Primary | ICD-10-CM

## 2024-05-03 DIAGNOSIS — I10 ESSENTIAL HYPERTENSION, BENIGN: ICD-10-CM

## 2024-05-03 DIAGNOSIS — I10 ESSENTIAL HYPERTENSION: ICD-10-CM

## 2024-05-03 DIAGNOSIS — Z12.5 SCREENING PSA (PROSTATE SPECIFIC ANTIGEN): ICD-10-CM

## 2024-05-03 DIAGNOSIS — E78.2 MIXED HYPERLIPIDEMIA: ICD-10-CM

## 2024-05-03 DIAGNOSIS — N18.31 STAGE 3A CHRONIC KIDNEY DISEASE: ICD-10-CM

## 2024-05-03 NOTE — TELEPHONE ENCOUNTER
Lab orders pended to [facility].  Patient notified, verbalized understanding.  To MD for approval -[email or web address]

## 2024-05-03 NOTE — TELEPHONE ENCOUNTER
----- Message from Mechelle Cosme sent at 5/3/2024  8:31 AM CDT -----  Pt calling to remind doctor to place in lab work before yearly check up.  [phone]

## 2024-05-07 ENCOUNTER — TELEPHONE (OUTPATIENT)
Dept: FAMILY MEDICINE | Facility: CLINIC | Age: 69
End: 2024-05-07
Payer: MEDICARE

## 2024-05-07 NOTE — TELEPHONE ENCOUNTER
Pt advised orders signed to [facility]. Pt upset that he has called 3 times. Apologized to pt. Orders signed.

## 2024-05-07 NOTE — TELEPHONE ENCOUNTER
----- Message from Apurva Huertas sent at 5/7/2024  3:01 PM CDT -----  Pt needs his blood work done. Send lab orders to [facility]. Please advise.  Pt #[phone]

## 2024-05-09 LAB
ALBUMIN SERPL-MCNC: 4.5 G/DL (ref 3.6–5.1)
ALBUMIN/CREAT UR: 10 MG/G CREAT
ALBUMIN/GLOB SERPL: 1.6 (CALC) (ref 1–2.5)
ALP SERPL-CCNC: 55 U/L (ref 35–144)
ALT SERPL-CCNC: 15 U/L (ref 9–46)
APPEARANCE UR: CLEAR
AST SERPL-CCNC: 17 U/L (ref 10–35)
BACTERIA #/AREA URNS HPF: NORMAL /HPF
BACTERIA UR CULT: NORMAL
BASOPHILS # BLD AUTO: 28 CELLS/UL (ref 0–200)
BASOPHILS NFR BLD AUTO: 0.5 %
BILIRUB SERPL-MCNC: 0.5 MG/DL (ref 0.2–1.2)
BILIRUB UR QL STRIP: NEGATIVE
BUN SERPL-MCNC: 18 MG/DL (ref 7–25)
BUN/CREAT SERPL: NORMAL (CALC) (ref 6–22)
CALCIUM SERPL-MCNC: 9.5 MG/DL (ref 8.6–10.3)
CHLORIDE SERPL-SCNC: 107 MMOL/L (ref 98–110)
CHOLEST SERPL-MCNC: 128 MG/DL
CHOLEST/HDLC SERPL: 3 (CALC)
CO2 SERPL-SCNC: 28 MMOL/L (ref 20–32)
COLOR UR: YELLOW
CREAT SERPL-MCNC: 1.23 MG/DL (ref 0.7–1.28)
CREAT UR-MCNC: 104 MG/DL (ref 20–320)
EGFR: 63 ML/MIN/1.73M2
EOSINOPHIL # BLD AUTO: 110 CELLS/UL (ref 15–500)
EOSINOPHIL NFR BLD AUTO: 2 %
ERYTHROCYTE [DISTWIDTH] IN BLOOD BY AUTOMATED COUNT: 12.8 % (ref 11–15)
GLOBULIN SER CALC-MCNC: 2.8 G/DL (CALC) (ref 1.9–3.7)
GLUCOSE SERPL-MCNC: 89 MG/DL (ref 65–99)
GLUCOSE UR QL STRIP: NEGATIVE
HCT VFR BLD AUTO: 39.2 % (ref 38.5–50)
HDLC SERPL-MCNC: 43 MG/DL
HGB BLD-MCNC: 12.5 G/DL (ref 13.2–17.1)
HGB UR QL STRIP: NEGATIVE
HYALINE CASTS #/AREA URNS LPF: NORMAL /LPF
KETONES UR QL STRIP: NEGATIVE
LDLC SERPL CALC-MCNC: 70 MG/DL (CALC)
LEUKOCYTE ESTERASE UR QL STRIP: NEGATIVE
LYMPHOCYTES # BLD AUTO: 1733 CELLS/UL (ref 850–3900)
LYMPHOCYTES NFR BLD AUTO: 31.5 %
MCH RBC QN AUTO: 28.6 PG (ref 27–33)
MCHC RBC AUTO-ENTMCNC: 31.9 G/DL (ref 32–36)
MCV RBC AUTO: 89.7 FL (ref 80–100)
MICROALBUMIN UR-MCNC: 1 MG/DL
MONOCYTES # BLD AUTO: 468 CELLS/UL (ref 200–950)
MONOCYTES NFR BLD AUTO: 8.5 %
NEUTROPHILS # BLD AUTO: 3163 CELLS/UL (ref 1500–7800)
NEUTROPHILS NFR BLD AUTO: 57.5 %
NITRITE UR QL STRIP: NEGATIVE
NONHDLC SERPL-MCNC: 85 MG/DL (CALC)
PH UR STRIP: 5.5 [PH] (ref 5–8)
PLATELET # BLD AUTO: 220 THOUSAND/UL (ref 140–400)
PMV BLD REES-ECKER: 10.6 FL (ref 7.5–12.5)
POTASSIUM SERPL-SCNC: 5.1 MMOL/L (ref 3.5–5.3)
PROT SERPL-MCNC: 7.3 G/DL (ref 6.1–8.1)
PROT UR QL STRIP: NEGATIVE
PSA SERPL-MCNC: 1.26 NG/ML
RBC # BLD AUTO: 4.37 MILLION/UL (ref 4.2–5.8)
RBC #/AREA URNS HPF: NORMAL /HPF
SERVICE CMNT-IMP: NORMAL
SODIUM SERPL-SCNC: 143 MMOL/L (ref 135–146)
SP GR UR STRIP: 1.01 (ref 1–1.03)
SQUAMOUS #/AREA URNS HPF: NORMAL /HPF
TRIGL SERPL-MCNC: 72 MG/DL
TSH SERPL-ACNC: 2.49 MIU/L (ref 0.4–4.5)
WBC # BLD AUTO: 5.5 THOUSAND/UL (ref 3.8–10.8)
WBC #/AREA URNS HPF: NORMAL /HPF

## 2024-05-15 ENCOUNTER — OFFICE VISIT (OUTPATIENT)
Dept: FAMILY MEDICINE | Facility: CLINIC | Age: 69
End: 2024-05-15
Attending: FAMILY MEDICINE
Payer: MEDICARE

## 2024-05-15 VITALS
SYSTOLIC BLOOD PRESSURE: 136 MMHG | DIASTOLIC BLOOD PRESSURE: 58 MMHG | WEIGHT: 178 LBS | HEART RATE: 86 BPM | OXYGEN SATURATION: 99 % | HEIGHT: 66 IN | BODY MASS INDEX: 28.61 KG/M2

## 2024-05-15 DIAGNOSIS — E78.2 MIXED HYPERLIPIDEMIA: ICD-10-CM

## 2024-05-15 DIAGNOSIS — N52.9 ERECTILE DYSFUNCTION, UNSPECIFIED ERECTILE DYSFUNCTION TYPE: ICD-10-CM

## 2024-05-15 DIAGNOSIS — I10 ESSENTIAL HYPERTENSION, BENIGN: Primary | ICD-10-CM

## 2024-05-15 DIAGNOSIS — N18.31 STAGE 3A CHRONIC KIDNEY DISEASE: ICD-10-CM

## 2024-05-15 PROCEDURE — 1159F MED LIST DOCD IN RCRD: CPT | Mod: CPTII,S$GLB,, | Performed by: FAMILY MEDICINE

## 2024-05-15 PROCEDURE — 3288F FALL RISK ASSESSMENT DOCD: CPT | Mod: CPTII,S$GLB,, | Performed by: FAMILY MEDICINE

## 2024-05-15 PROCEDURE — 99214 OFFICE O/P EST MOD 30 MIN: CPT | Mod: S$GLB,,, | Performed by: FAMILY MEDICINE

## 2024-05-15 PROCEDURE — 4010F ACE/ARB THERAPY RXD/TAKEN: CPT | Mod: CPTII,S$GLB,, | Performed by: FAMILY MEDICINE

## 2024-05-15 PROCEDURE — 3078F DIAST BP <80 MM HG: CPT | Mod: CPTII,S$GLB,, | Performed by: FAMILY MEDICINE

## 2024-05-15 PROCEDURE — 1101F PT FALLS ASSESS-DOCD LE1/YR: CPT | Mod: CPTII,S$GLB,, | Performed by: FAMILY MEDICINE

## 2024-05-15 PROCEDURE — 3066F NEPHROPATHY DOC TX: CPT | Mod: CPTII,S$GLB,, | Performed by: FAMILY MEDICINE

## 2024-05-15 PROCEDURE — 3008F BODY MASS INDEX DOCD: CPT | Mod: CPTII,S$GLB,, | Performed by: FAMILY MEDICINE

## 2024-05-15 PROCEDURE — 3075F SYST BP GE 130 - 139MM HG: CPT | Mod: CPTII,S$GLB,, | Performed by: FAMILY MEDICINE

## 2024-05-15 PROCEDURE — 3061F NEG MICROALBUMINURIA REV: CPT | Mod: CPTII,S$GLB,, | Performed by: FAMILY MEDICINE

## 2024-05-15 PROCEDURE — 1160F RVW MEDS BY RX/DR IN RCRD: CPT | Mod: CPTII,S$GLB,, | Performed by: FAMILY MEDICINE

## 2024-05-15 RX ORDER — AMLODIPINE AND BENAZEPRIL HYDROCHLORIDE 10; 20 MG/1; MG/1
1 CAPSULE ORAL DAILY
Qty: 90 CAPSULE | Refills: 3 | Status: SHIPPED | OUTPATIENT
Start: 2024-05-15

## 2024-05-15 RX ORDER — ATORVASTATIN CALCIUM 40 MG/1
40 TABLET, FILM COATED ORAL DAILY
Qty: 90 TABLET | Refills: 3 | Status: SHIPPED | OUTPATIENT
Start: 2024-05-15 | End: 2025-05-15

## 2024-05-15 RX ORDER — SILDENAFIL 100 MG/1
100 TABLET, FILM COATED ORAL DAILY PRN
Qty: 30 TABLET | Refills: 5 | Status: SHIPPED | OUTPATIENT
Start: 2024-05-15 | End: 2025-05-15

## 2024-05-15 NOTE — PROGRESS NOTES
SUBJECTIVE:   HPI: Aleksandar Knight Sr.  is a 68 y.o. male who presents for regular visit   Annual Exam    Active individual in for visit. He has been taking his medications for HTN and HLD with no issue. Labs are performed and are all within normal limits. Reports UTD with [facility] in the last 5 years. UTD with dental in the past 6 months, goes every 6 months. No vision issues   Rides motorcycles and does camping routinely         Telephone on 05/03/2024   Component Date Value Ref Range Status    Color, UA 05/08/2024 YELLOW  YELLOW Final    Appearance, UA 05/08/2024 CLEAR  CLEAR Final    Specific Gravity, UA 05/08/2024 1.014  1.001 - 1.035 Final    pH, UA 05/08/2024 5.5  5.0 - 8.0 Final    Glucose, UA 05/08/2024 NEGATIVE  NEGATIVE Final    Bilirubin, UA 05/08/2024 NEGATIVE  NEGATIVE Final    Ketones, UA 05/08/2024 NEGATIVE  NEGATIVE Final    Occult Blood UA 05/08/2024 NEGATIVE  NEGATIVE Final    Protein, UA 05/08/2024 NEGATIVE  NEGATIVE Final    Nitrite, UA 05/08/2024 NEGATIVE  NEGATIVE Final    Leukocytes, UA 05/08/2024 NEGATIVE  NEGATIVE Final    WBC Casts, UA 05/08/2024 NONE SEEN  < OR = 5 /HPF Final    RBC Casts, UA 05/08/2024 NONE SEEN  < OR = 2 /HPF Final    Squam Epithel, UA 05/08/2024 NONE SEEN  < OR = 5 /HPF Final    Bacteria, UA 05/08/2024 NONE SEEN  NONE SEEN /HPF Final    Hyaline Casts, UA 05/08/2024 NONE SEEN  NONE SEEN /LPF Final    Service Cmt: 05/08/2024    Final    Reflexive Urine Culture 05/08/2024    Final    TSH w/reflex to FT4 05/08/2024 2.49  0.40 - 4.50 mIU/L Final    Cholesterol 05/08/2024 128  <200 mg/dL Final    HDL 05/08/2024 43  > OR = 40 mg/dL Final    Triglycerides 05/08/2024 72  <150 mg/dL Final    LDL Cholesterol 05/08/2024 70  mg/dL (calc) Final    HDL/Cholesterol Ratio 05/08/2024 3.0  <5.0 (calc) Final    Non HDL Chol. (LDL+VLDL) 05/08/2024 85  <130 mg/dL (calc) Final    Glucose 05/08/2024 89  65 - 99 mg/dL Final    BUN 05/08/2024 18  7 - 25 mg/dL Final    Creatinine 05/08/2024 1.23   0.70 - 1.28 mg/dL Final    eGFR 05/08/2024 63  > OR = 60 mL/min/1.73m2 Final    BUN/Creatinine Ratio 05/08/2024 SEE NOTE:  6 - 22 (calc) Final    Sodium 05/08/2024 143  135 - 146 mmol/L Final    Potassium 05/08/2024 5.1  3.5 - 5.3 mmol/L Final    Chloride 05/08/2024 107  98 - 110 mmol/L Final    CO2 05/08/2024 28  20 - 32 mmol/L Final    Calcium 05/08/2024 9.5  8.6 - 10.3 mg/dL Final    Total Protein 05/08/2024 7.3  6.1 - 8.1 g/dL Final    Albumin 05/08/2024 4.5  3.6 - 5.1 g/dL Final    Globulin, Total 05/08/2024 2.8  1.9 - 3.7 g/dL (calc) Final    Albumin/Globulin Ratio 05/08/2024 1.6  1.0 - 2.5 (calc) Final    Total Bilirubin 05/08/2024 0.5  0.2 - 1.2 mg/dL Final    Alkaline Phosphatase 05/08/2024 55  35 - 144 U/L Final    AST 05/08/2024 17  10 - 35 U/L Final    ALT 05/08/2024 15  9 - 46 U/L Final    WBC 05/08/2024 5.5  3.8 - 10.8 Thousand/uL Final    RBC 05/08/2024 4.37  4.20 - 5.80 Million/uL Final    Hemoglobin 05/08/2024 12.5 (L)  13.2 - 17.1 g/dL Final    Hematocrit 05/08/2024 39.2  38.5 - 50.0 % Final    MCV 05/08/2024 89.7  80.0 - 100.0 fL Final    MCH 05/08/2024 28.6  27.0 - 33.0 pg Final    MCHC 05/08/2024 31.9 (L)  32.0 - 36.0 g/dL Final    RDW 05/08/2024 12.8  11.0 - 15.0 % Final    Platelets 05/08/2024 220  140 - 400 Thousand/uL Final    MPV 05/08/2024 10.6  7.5 - 12.5 fL Final    Neutrophils, Abs 05/08/2024 3,163  1,500 - 7,800 cells/uL Final    Lymph # 05/08/2024 1,733  850 - 3,900 cells/uL Final    Mono # 05/08/2024 468  200 - 950 cells/uL Final    Eos # 05/08/2024 110  15 - 500 cells/uL Final    Baso # 05/08/2024 28  0 - 200 cells/uL Final    Neutrophils Relative 05/08/2024 57.5  % Final    Lymph % 05/08/2024 31.5  % Final    Mono % 05/08/2024 8.5  % Final    Eosinophil % 05/08/2024 2.0  % Final    Basophil % 05/08/2024 0.5  % Final    Creatinine, Urine 05/08/2024 104  20 - 320 mg/dL Final    Microalb, Ur 05/08/2024 1.0  See Note: mg/dL Final    Microalb/Creat Ratio 05/08/2024 10  <30 mg/g creat  Final    PROSTATE SPECIFIC ANTIGEN, SCR - Q* 05/08/2024 1.26  < OR = 4.00 ng/mL Final      (Not in a hospital admission)    Review of patient's allergies indicates:  No Known Allergies  Current Outpatient Medications on File Prior to Visit   Medication Sig Dispense Refill    [DISCONTINUED] amlodipine-benazepril 10-20mg (LOTREL) 10-20 mg per capsule Take 1 capsule by mouth once daily. 90 capsule 3    [DISCONTINUED] atorvastatin (LIPITOR) 40 MG tablet Take 1 tablet (40 mg total) by mouth once daily. For cholesterol 90 tablet 3    [DISCONTINUED] sildenafiL (VIAGRA) 100 MG tablet Take 1 tablet (100 mg total) by mouth daily as needed for Erectile Dysfunction. 30 tablet 1     No current facility-administered medications on file prior to visit.     Past Medical History:   Diagnosis Date    Dupuytren contracture 2022    Hyperlipidemia     Hypertension      Past Surgical History:   Procedure Laterality Date    COLONOSCOPY N/A 11/20/2019    Procedure: COLONOSCOPY;  Surgeon: Nabeel Hart MD;  Location: [facility] ENDO;  Service: Endoscopy;  Laterality: N/A;    DUPUYTREN CONTRACTURE RELEASE Right 07/27/2022    Procedure: RELEASE, DUPUYTREN CONTRACTURE;  Surgeon: Rafi Andre MD;  Location: [facility] OR;  Service: Orthopedics;  Laterality: Right;    DUPUYTREN CONTRACTURE RELEASE  03/2023    Dr. Bundy [facility] [place]     Family History   Problem Relation Name Age of Onset    Cancer Father          unkn type     Social History     Tobacco Use    Smoking status: Never     Passive exposure: Never    Smokeless tobacco: Never   Substance Use Topics    Alcohol use: Never    Drug use: Never      Health Maintenance Topics with due status: Not Due       Topic Last Completion Date    Colorectal Cancer Screening 11/20/2019    Annual UACr 05/08/2024    Lipid Panel 05/08/2024     Immunization History   Administered Date(s) Administered    Pneumococcal Conjugate - 13 Valent 08/15/2018    Pneumococcal Polysaccharide - 23 Valent  "09/26/2019       Review of Systems   Constitutional:  Negative for fatigue, fever and unexpected weight change.   HENT:  Negative for congestion, postnasal drip, rhinorrhea and sore throat.    Eyes:  Negative for photophobia, pain and visual disturbance.   Respiratory:  Negative for cough, shortness of breath and wheezing.    Cardiovascular:  Negative for chest pain and palpitations.   Gastrointestinal:  Negative for constipation, diarrhea, nausea and vomiting.   Genitourinary:  Negative for difficulty urinating, dysuria, frequency, hematuria and urgency.   Musculoskeletal:  Negative for arthralgias and myalgias.   Skin:  Negative for rash.   Neurological:  Negative for dizziness and headaches.   Psychiatric/Behavioral:  Negative for sleep disturbance.       OBJECTIVE:          5/15/2023     7:53 AM 5/15/2024     8:21 AM   Vitals - 1 value per visit   SYSTOLIC 136 136   DIASTOLIC 60 58   Pulse 76 86   SPO2 99 % 99 %   Weight (lb) 200 178   Weight (kg) 90.719 80.74   Height 5' 6" (1.676 m) 5' 6" (1.676 m)   BMI (Calculated) 32.3 28.7      Physical Exam  Vitals reviewed.   Constitutional:       General: He is not in acute distress.     Appearance: He is well-developed.   HENT:      Head: Normocephalic and atraumatic.      Right Ear: External ear normal.      Left Ear: External ear normal.      Nose: Nose normal.      Mouth/Throat:      Mouth: Mucous membranes are moist.   Eyes:      Conjunctiva/sclera: Conjunctivae normal.      Pupils: Pupils are equal, round, and reactive to light.   Neck:      Thyroid: No thyromegaly.   Cardiovascular:      Rate and Rhythm: Normal rate and regular rhythm.      Pulses: Normal pulses.      Heart sounds: No murmur heard.  Pulmonary:      Effort: Pulmonary effort is normal.      Breath sounds: Normal breath sounds.   Abdominal:      General: Bowel sounds are normal.      Palpations: Abdomen is soft. There is no mass.      Tenderness: There is no abdominal tenderness.   Musculoskeletal:  "        General: No tenderness. Normal range of motion.      Cervical back: Normal range of motion and neck supple.   Skin:     General: Skin is warm and dry.      Findings: No rash.   Neurological:      General: No focal deficit present.      Mental Status: He is alert and oriented to person, place, and time.      Cranial Nerves: No cranial nerve deficit.   Psychiatric:         Mood and Affect: Mood normal.         Behavior: Behavior normal.        Assessment:       1. Essential hypertension, benign    2. Stage 3a chronic kidney disease    3. Mixed hyperlipidemia    4. Erectile dysfunction, unspecified erectile dysfunction type        Plan:       Essential hypertension, benign  -     amlodipine-benazepril 10-20mg (LOTREL) 10-20 mg per capsule; Take 1 capsule by mouth once daily.  Dispense: 90 capsule; Refill: 3    Stage 3a chronic kidney disease  Comments:  stable    Mixed hyperlipidemia  Comments:  Doing well on current dose  Orders:  -     atorvastatin (LIPITOR) 40 MG tablet; Take 1 tablet (40 mg total) by mouth once daily. For cholesterol  Dispense: 90 tablet; Refill: 3    Erectile dysfunction, unspecified erectile dysfunction type  -     sildenafiL (VIAGRA) 100 MG tablet; Take 1 tablet (100 mg total) by mouth daily as needed for Erectile Dysfunction.  Dispense: 30 tablet; Refill: 5      Medication List with Changes/Refills   Changed and/or Refilled Medications    Modified Medication Previous Medication    AMLODIPINE-BENAZEPRIL 10-20MG (LOTREL) 10-20 MG PER CAPSULE amlodipine-benazepril 10-20mg (LOTREL) 10-20 mg per capsule       Take 1 capsule by mouth once daily.    Take 1 capsule by mouth once daily.    ATORVASTATIN (LIPITOR) 40 MG TABLET atorvastatin (LIPITOR) 40 MG tablet       Take 1 tablet (40 mg total) by mouth once daily. For cholesterol    Take 1 tablet (40 mg total) by mouth once daily. For cholesterol    SILDENAFIL (VIAGRA) 100 MG TABLET sildenafiL (VIAGRA) 100 MG tablet       Take 1 tablet (100 mg  total) by mouth daily as needed for Erectile Dysfunction.    Take 1 tablet (100 mg total) by mouth daily as needed for Erectile Dysfunction.        Counseled on age and gender appropriate medical preventative services, including cancer screenings, immunizations, overall nutritional health, need for a consistent exercise regimen and an overall push towards maintaining a vigorous and active lifestyle.      Follow up in about 1 year (around 5/15/2025) for Annual Physical.

## 2025-05-02 ENCOUNTER — TELEPHONE (OUTPATIENT)
Dept: FAMILY MEDICINE | Facility: CLINIC | Age: 70
End: 2025-05-02
Payer: MEDICARE

## 2025-05-02 DIAGNOSIS — E78.2 MIXED HYPERLIPIDEMIA: ICD-10-CM

## 2025-05-02 DIAGNOSIS — I10 ESSENTIAL HYPERTENSION: ICD-10-CM

## 2025-05-02 DIAGNOSIS — I10 ESSENTIAL HYPERTENSION, BENIGN: ICD-10-CM

## 2025-05-02 DIAGNOSIS — Z79.899 ENCOUNTER FOR LONG-TERM (CURRENT) USE OF MEDICATIONS: Primary | ICD-10-CM

## 2025-05-02 DIAGNOSIS — N18.31 STAGE 3A CHRONIC KIDNEY DISEASE: ICD-10-CM

## 2025-05-02 DIAGNOSIS — Z12.5 SCREENING PSA (PROSTATE SPECIFIC ANTIGEN): ICD-10-CM

## 2025-05-02 NOTE — TELEPHONE ENCOUNTER
----- Message from Mechelle sent at 5/2/2025  7:57 AM CDT -----  Patient is calling for a reminder to sign off on his lab orders. So that he is able to get the labs completed next week. [phone]

## 2025-05-02 NOTE — TELEPHONE ENCOUNTER
Spoke with patient and let him know the labs are in to [facility] but he has to wait to have them done until next Friday in order for the insurance to pay for the PSA

## 2025-05-15 ENCOUNTER — OFFICE VISIT (OUTPATIENT)
Dept: FAMILY MEDICINE | Facility: CLINIC | Age: 70
End: 2025-05-15
Payer: MEDICARE

## 2025-05-15 VITALS
HEIGHT: 66 IN | WEIGHT: 189 LBS | BODY MASS INDEX: 30.37 KG/M2 | RESPIRATION RATE: 18 BRPM | OXYGEN SATURATION: 98 % | SYSTOLIC BLOOD PRESSURE: 162 MMHG | HEART RATE: 82 BPM | DIASTOLIC BLOOD PRESSURE: 60 MMHG

## 2025-05-15 DIAGNOSIS — Z12.11 COLON CANCER SCREENING: ICD-10-CM

## 2025-05-15 DIAGNOSIS — E78.2 MIXED HYPERLIPIDEMIA: ICD-10-CM

## 2025-05-15 DIAGNOSIS — N18.31 STAGE 3A CHRONIC KIDNEY DISEASE: ICD-10-CM

## 2025-05-15 DIAGNOSIS — I10 ESSENTIAL HYPERTENSION, BENIGN: Primary | ICD-10-CM

## 2025-05-15 DIAGNOSIS — N52.9 ERECTILE DYSFUNCTION, UNSPECIFIED ERECTILE DYSFUNCTION TYPE: ICD-10-CM

## 2025-05-15 PROCEDURE — 1159F MED LIST DOCD IN RCRD: CPT | Mod: CPTII,S$GLB,, | Performed by: FAMILY MEDICINE

## 2025-05-15 PROCEDURE — 3066F NEPHROPATHY DOC TX: CPT | Mod: CPTII,S$GLB,, | Performed by: FAMILY MEDICINE

## 2025-05-15 PROCEDURE — 3061F NEG MICROALBUMINURIA REV: CPT | Mod: CPTII,S$GLB,, | Performed by: FAMILY MEDICINE

## 2025-05-15 PROCEDURE — 3008F BODY MASS INDEX DOCD: CPT | Mod: CPTII,S$GLB,, | Performed by: FAMILY MEDICINE

## 2025-05-15 PROCEDURE — 3077F SYST BP >= 140 MM HG: CPT | Mod: CPTII,S$GLB,, | Performed by: FAMILY MEDICINE

## 2025-05-15 PROCEDURE — 1101F PT FALLS ASSESS-DOCD LE1/YR: CPT | Mod: CPTII,S$GLB,, | Performed by: FAMILY MEDICINE

## 2025-05-15 PROCEDURE — 3078F DIAST BP <80 MM HG: CPT | Mod: CPTII,S$GLB,, | Performed by: FAMILY MEDICINE

## 2025-05-15 PROCEDURE — 4010F ACE/ARB THERAPY RXD/TAKEN: CPT | Mod: CPTII,S$GLB,, | Performed by: FAMILY MEDICINE

## 2025-05-15 PROCEDURE — 1160F RVW MEDS BY RX/DR IN RCRD: CPT | Mod: CPTII,S$GLB,, | Performed by: FAMILY MEDICINE

## 2025-05-15 PROCEDURE — 3288F FALL RISK ASSESSMENT DOCD: CPT | Mod: CPTII,S$GLB,, | Performed by: FAMILY MEDICINE

## 2025-05-15 PROCEDURE — 1126F AMNT PAIN NOTED NONE PRSNT: CPT | Mod: CPTII,S$GLB,, | Performed by: FAMILY MEDICINE

## 2025-05-15 PROCEDURE — 99214 OFFICE O/P EST MOD 30 MIN: CPT | Mod: S$GLB,,, | Performed by: FAMILY MEDICINE

## 2025-05-15 RX ORDER — SILDENAFIL 100 MG/1
100 TABLET, FILM COATED ORAL DAILY PRN
Qty: 30 TABLET | Refills: 5 | Status: SHIPPED | OUTPATIENT
Start: 2025-05-15 | End: 2025-05-15 | Stop reason: SDUPTHER

## 2025-05-15 RX ORDER — SILDENAFIL 100 MG/1
100 TABLET, FILM COATED ORAL DAILY PRN
Qty: 30 TABLET | Refills: 5 | Status: SHIPPED | OUTPATIENT
Start: 2025-05-15 | End: 2026-05-15

## 2025-05-15 RX ORDER — ATORVASTATIN CALCIUM 40 MG/1
40 TABLET, FILM COATED ORAL DAILY
Qty: 90 TABLET | Refills: 3 | Status: SHIPPED | OUTPATIENT
Start: 2025-05-15 | End: 2026-05-15

## 2025-05-15 RX ORDER — AMLODIPINE AND BENAZEPRIL HYDROCHLORIDE 10; 20 MG/1; MG/1
1 CAPSULE ORAL DAILY
Qty: 90 CAPSULE | Refills: 3 | Status: SHIPPED | OUTPATIENT
Start: 2025-05-15

## 2025-05-15 NOTE — PROGRESS NOTES
SUBJECTIVE:   HPI: Aleksandar Knight Sr.  is a 69 y.o. male who presents for regular visit   Annual Exam (No bottles//refills needed//labs results//pt states he have no complaints today//pt refused colon cancer screening )    History of Present Illness    CHIEF COMPLAINT:  Patient presents today for follow up with no complaints    BLOOD PRESSURE AND EXERCISE TOLERANCE:  He checks blood pressure daily at home with most recent self-measurement of 137. He maintains ability to perform yard work in hot weather without complications, taking breaks as needed with water intake.    DIET:  He maintains a health-conscious diet, avoiding fried foods. Lunch is his main meal of the day and he typically skips breakfast. Dinner consists of fruits and salads. He only drinks water and unsweetened tea.    MEDICAL HISTORY:  He has chronic kidney disease stage three and a history of GI issues with chronic bowel irregularity following a prior procedure.    MEDICATIONS:  He takes medications with lunch and carries vitamins and other medications in his pocket.    LABS:  Blood count and blood sugar are normal. Liver function tests are within normal limits. Cholesterol is borderline at 216 total, with LDL increased from 70 to 153 over the past year. PSA and urinalysis are normal with no proteinuria.       Telephone on 05/02/2025   Component Date Value Ref Range Status    WBC 05/09/2025 5.7  3.8 - 10.8 Thousand/uL Final    RBC 05/09/2025 4.36  4.20 - 5.80 Million/uL Final    Hemoglobin 05/09/2025 12.5 (L)  13.2 - 17.1 g/dL Final    Hematocrit 05/09/2025 38.7  38.5 - 50.0 % Final    MCV 05/09/2025 88.8  80.0 - 100.0 fL Final    MCH 05/09/2025 28.7  27.0 - 33.0 pg Final    MCHC 05/09/2025 32.3  32.0 - 36.0 g/dL Final    RDW 05/09/2025 12.4  11.0 - 15.0 % Final    Platelets 05/09/2025 252  140 - 400 Thousand/uL Final    MPV 05/09/2025 10.0  7.5 - 12.5 fL Final    Neutrophils, Abs 05/09/2025 3,192  1,500 - 7,800 cells/uL Final    Lymph #  05/09/2025 1,756  850 - 3,900 cells/uL Final    Mono # 05/09/2025 564  200 - 950 cells/uL Final    Eos # 05/09/2025 160  15 - 500 cells/uL Final    Baso # 05/09/2025 29  0 - 200 cells/uL Final    Neutrophils Relative 05/09/2025 56  % Final    Lymph % 05/09/2025 30.8  % Final    Mono % 05/09/2025 9.9  % Final    Eosinophil % 05/09/2025 2.8  % Final    Basophil % 05/09/2025 0.5  % Final    Glucose 05/09/2025 87  65 - 99 mg/dL Final    BUN 05/09/2025 23  7 - 25 mg/dL Final    Creatinine 05/09/2025 1.39 (H)  0.70 - 1.28 mg/dL Final    eGFR 05/09/2025 54 (L)  > OR = 60 mL/min/1.73m2 Final    BUN/Creatinine Ratio 05/09/2025 17  6 - 22 (calc) Final    Sodium 05/09/2025 141  135 - 146 mmol/L Final    Potassium 05/09/2025 4.8  3.5 - 5.3 mmol/L Final    Chloride 05/09/2025 104  98 - 110 mmol/L Final    CO2 05/09/2025 25  20 - 32 mmol/L Final    Calcium 05/09/2025 9.8  8.6 - 10.3 mg/dL Final    Total Protein 05/09/2025 7.6  6.1 - 8.1 g/dL Final    Albumin 05/09/2025 4.6  3.6 - 5.1 g/dL Final    Globulin, Total 05/09/2025 3.0  1.9 - 3.7 g/dL (calc) Final    Albumin/Globulin Ratio 05/09/2025 1.5  1.0 - 2.5 (calc) Final    Total Bilirubin 05/09/2025 0.5  0.2 - 1.2 mg/dL Final    Alkaline Phosphatase 05/09/2025 69  35 - 144 U/L Final    AST 05/09/2025 18  10 - 35 U/L Final    ALT 05/09/2025 20  9 - 46 U/L Final    Cholesterol 05/09/2025 216 (H)  <200 mg/dL Final    HDL 05/09/2025 42  > OR = 40 mg/dL Final    Triglycerides 05/09/2025 97  <150 mg/dL Final    LDL Cholesterol 05/09/2025 153 (H)  mg/dL (calc) Final    HDL/Cholesterol Ratio 05/09/2025 5.1 (H)  <5.0 (calc) Final    Non HDL Chol. (LDL+VLDL) 05/09/2025 174 (H)  <130 mg/dL (calc) Final    Creatinine, Urine 05/09/2025 121  20 - 320 mg/dL Final    Microalb, Ur 05/09/2025 2.1  See Note: mg/dL Final    Microalb/Creat Ratio 05/09/2025 17  <30 mg/g creat Final    TSH w/reflex to FT4 05/09/2025 3.68  0.40 - 4.50 mIU/L Final    PROSTATE SPECIFIC ANTIGEN, SCR - Q* 05/09/2025 1.08  <  OR = 4.00 ng/mL Final    Color, UA 05/09/2025 YELLOW  YELLOW Final    Appearance, UA 05/09/2025 CLEAR  CLEAR Final    Specific Gravity, UA 05/09/2025 1.015  1.001 - 1.035 Final    pH, UA 05/09/2025 5.5  5.0 - 8.0 Final    Glucose, UA 05/09/2025 NEGATIVE  NEGATIVE Final    Bilirubin, UA 05/09/2025 NEGATIVE  NEGATIVE Final    Ketones, UA 05/09/2025 NEGATIVE  NEGATIVE Final    Occult Blood UA 05/09/2025 NEGATIVE  NEGATIVE Final    Protein, UA 05/09/2025 NEGATIVE  NEGATIVE Final    Nitrite, UA 05/09/2025 NEGATIVE  NEGATIVE Final    Leukocytes, UA 05/09/2025 NEGATIVE  NEGATIVE Final    WBC Casts, UA 05/09/2025 NONE SEEN  < OR = 5 /HPF Final    RBC Casts, UA 05/09/2025 NONE SEEN  < OR = 2 /HPF Final    Squam Epithel, UA 05/09/2025 NONE SEEN  < OR = 5 /HPF Final    Bacteria, UA 05/09/2025 NONE SEEN  NONE SEEN /HPF Final    Hyaline Casts, UA 05/09/2025 NONE SEEN  NONE SEEN /LPF Final    Service Cmt: 05/09/2025 SEE COMMENT   Final    Reflexive Urine Culture 05/09/2025 SEE COMMENT   Final      (Not in a hospital admission)    Review of patient's allergies indicates:  No Known Allergies  Medications Ordered Prior to Encounter[1]  Past Medical History:   Diagnosis Date    Dupuytren contracture 2022    Hyperlipidemia     Hypertension      Past Surgical History:   Procedure Laterality Date    COLONOSCOPY N/A 11/20/2019    Procedure: COLONOSCOPY;  Surgeon: Nabeel aHrt MD;  Location: [facility] ENDO;  Service: Endoscopy;  Laterality: N/A;    DUPUYTREN CONTRACTURE RELEASE Right 07/27/2022    Procedure: RELEASE, DUPUYTREN CONTRACTURE;  Surgeon: Rafi Andre MD;  Location: [facility] OR;  Service: Orthopedics;  Laterality: Right;    DUPUYTREN CONTRACTURE RELEASE  03/2023    Dr. Gregor Boucher [facility] [place]     Family History   Problem Relation Name Age of Onset    Cancer Father          unkn type     Social History[2]   Health Maintenance Topics with due status: Not Due       Topic Last Completion Date    Annual UACr 05/09/2025     "Lipid Panel 05/09/2025     Immunization History   Administered Date(s) Administered    Pneumococcal Conjugate - 13 Valent 08/15/2018    Pneumococcal Polysaccharide - 23 Valent 09/26/2019       Review of Systems   Constitutional:  Negative for fatigue, fever and unexpected weight change.   HENT:  Negative for congestion, postnasal drip, rhinorrhea and sore throat.    Eyes:  Negative for photophobia, pain and visual disturbance.   Respiratory:  Negative for cough, shortness of breath and wheezing.    Cardiovascular:  Negative for chest pain and palpitations.   Gastrointestinal:  Negative for constipation, diarrhea, nausea and vomiting.   Genitourinary:  Negative for difficulty urinating, dysuria, frequency, hematuria and urgency.   Musculoskeletal:  Negative for arthralgias and myalgias.   Skin:  Negative for rash.   Neurological:  Negative for dizziness and headaches.   Psychiatric/Behavioral:  Negative for sleep disturbance.       OBJECTIVE:          5/15/2024     8:21 AM 5/15/2025     7:56 AM   Vitals - 1 value per visit   SYSTOLIC 136 158   DIASTOLIC 58 60   Pulse 86 82   Resp  18   SPO2 99 % 98 %   Weight (lb) 178 189   Weight (kg) 80.74 85.73   Height 5' 6" (1.676 m) 5' 6" (1.676 m)   BMI (Calculated) 28.7 30.5   Pain Score  Zero      Physical Exam  Constitutional:       Appearance: Normal appearance.   HENT:      Head: Normocephalic and atraumatic.      Mouth/Throat:      Mouth: Mucous membranes are moist.   Eyes:      Conjunctiva/sclera: Conjunctivae normal.   Cardiovascular:      Rate and Rhythm: Normal rate and regular rhythm.   Pulmonary:      Effort: Pulmonary effort is normal.   Neurological:      General: No focal deficit present.      Mental Status: He is alert and oriented to person, place, and time.   Psychiatric:         Mood and Affect: Mood normal.         Behavior: Behavior normal.          Assessment:       Assessment & Plan      STAGE 3A CHRONIC KIDNEY DISEASE:  - Monitored the patient's renal " function which is midway slow but has been stable, indicating chronic kidney disease stage 3.  - Emphasized the importance of maintaining blood pressure control to manage the condition.  - Noted PSA is normal with no protein in urine.    HYPERTENSION:  - Blood pressure was elevated on initial check but has been perfect according to patient's home readings, with last recorded at 137 systolic.  - Rechecked blood pressure before departure.  - Refilled hypertension medication.    HYPERLIPIDEMIA:  - Cholesterol levels are borderline at 216, with LDL significantly increased from 70 to 153 compared to last year despite medication.  - Continued atorvastatin at current dose.  - Will reassess lipid profile next year.      GENERAL HEALTH:  - Patient feels good overall with no complaints or headaches.            Plan:       Essential hypertension, benign  -     amlodipine-benazepril 10-20mg (LOTREL) 10-20 mg per capsule; Take 1 capsule by mouth once daily.  Dispense: 90 capsule; Refill: 3    Erectile dysfunction, unspecified erectile dysfunction type  -     Discontinue: sildenafiL (VIAGRA) 100 MG tablet; Take 1 tablet (100 mg total) by mouth daily as needed for Erectile Dysfunction.  Dispense: 30 tablet; Refill: 5  -     sildenafiL (VIAGRA) 100 MG tablet; Take 1 tablet (100 mg total) by mouth daily as needed for Erectile Dysfunction.  Dispense: 30 tablet; Refill: 5    Mixed hyperlipidemia  -     atorvastatin (LIPITOR) 40 MG tablet; Take 1 tablet (40 mg total) by mouth once daily. For cholesterol  Dispense: 90 tablet; Refill: 3    Stage 3a chronic kidney disease    Colon cancer screening  -     Cologuard Screening (Multitarget Stool DNA); Future; Expected date: 05/15/2025      Medication List with Changes/Refills   Changed and/or Refilled Medications    Modified Medication Previous Medication    AMLODIPINE-BENAZEPRIL 10-20MG (LOTREL) 10-20 MG PER CAPSULE amlodipine-benazepril 10-20mg (LOTREL) 10-20 mg per capsule       Take 1  capsule by mouth once daily.    Take 1 capsule by mouth once daily.    ATORVASTATIN (LIPITOR) 40 MG TABLET atorvastatin (LIPITOR) 40 MG tablet       Take 1 tablet (40 mg total) by mouth once daily. For cholesterol    Take 1 tablet (40 mg total) by mouth once daily. For cholesterol    SILDENAFIL (VIAGRA) 100 MG TABLET sildenafiL (VIAGRA) 100 MG tablet       Take 1 tablet (100 mg total) by mouth daily as needed for Erectile Dysfunction.    Take 1 tablet (100 mg total) by mouth daily as needed for Erectile Dysfunction.        Counseled on age and gender appropriate medical preventative services, including cancer screenings, immunizations, overall nutritional health, need for a consistent exercise regimen and an overall push towards maintaining a vigorous and active lifestyle.      Follow up in about 1 year (around 5/15/2026) for Annual Physical, HTN.        This note was generated with the assistance of ambient listening technology. Verbal consent was obtained by the patient and accompanying visitor(s) for the recording of patient appointment to facilitate this note. I attest to having reviewed and edited the generated note for accuracy, though some syntax or spelling errors may persist. Please contact the author of this note for any clarification.                   [1]   Current Outpatient Medications on File Prior to Visit   Medication Sig Dispense Refill    [DISCONTINUED] amlodipine-benazepril 10-20mg (LOTREL) 10-20 mg per capsule Take 1 capsule by mouth once daily. 90 capsule 3    [DISCONTINUED] atorvastatin (LIPITOR) 40 MG tablet Take 1 tablet (40 mg total) by mouth once daily. For cholesterol 90 tablet 3    [DISCONTINUED] sildenafiL (VIAGRA) 100 MG tablet Take 1 tablet (100 mg total) by mouth daily as needed for Erectile Dysfunction. 30 tablet 5     No current facility-administered medications on file prior to visit.   [2]   Social History  Tobacco Use    Smoking status: Never     Passive exposure: Never     Smokeless tobacco: Never   Substance Use Topics    Alcohol use: Never    Drug use: Never

## 2025-05-28 ENCOUNTER — TELEPHONE (OUTPATIENT)
Dept: FAMILY MEDICINE | Facility: CLINIC | Age: 70
End: 2025-05-28

## 2025-05-28 ENCOUNTER — OFFICE VISIT (OUTPATIENT)
Dept: FAMILY MEDICINE | Facility: CLINIC | Age: 70
End: 2025-05-28
Payer: MEDICARE

## 2025-05-28 VITALS
WEIGHT: 185 LBS | SYSTOLIC BLOOD PRESSURE: 152 MMHG | HEART RATE: 72 BPM | BODY MASS INDEX: 29.73 KG/M2 | OXYGEN SATURATION: 100 % | TEMPERATURE: 98 F | HEIGHT: 66 IN | DIASTOLIC BLOOD PRESSURE: 60 MMHG

## 2025-05-28 DIAGNOSIS — Z12.11 SPECIAL SCREENING FOR MALIGNANT NEOPLASMS, COLON: Primary | ICD-10-CM

## 2025-05-28 DIAGNOSIS — S40.812A ABRASION OF LEFT ARM, INITIAL ENCOUNTER: Primary | ICD-10-CM

## 2025-05-28 DIAGNOSIS — L03.114 CELLULITIS OF LEFT UPPER EXTREMITY: ICD-10-CM

## 2025-05-28 RX ORDER — MUPIROCIN 20 MG/G
OINTMENT TOPICAL 3 TIMES DAILY
Qty: 15 G | Refills: 0 | Status: SHIPPED | OUTPATIENT
Start: 2025-05-28

## 2025-05-28 RX ORDER — CEFTRIAXONE 1 G/1
1 INJECTION, POWDER, FOR SOLUTION INTRAMUSCULAR; INTRAVENOUS ONCE
Status: COMPLETED | OUTPATIENT
Start: 2025-05-28 | End: 2025-05-28

## 2025-05-28 RX ORDER — CEFUROXIME AXETIL 500 MG/1
500 TABLET ORAL 2 TIMES DAILY
Qty: 20 TABLET | Refills: 0 | Status: SHIPPED | OUTPATIENT
Start: 2025-05-28 | End: 2025-06-07

## 2025-05-28 RX ADMIN — CEFTRIAXONE 1 G: 1 INJECTION, POWDER, FOR SOLUTION INTRAMUSCULAR; INTRAVENOUS at 10:05

## 2025-05-28 NOTE — TELEPHONE ENCOUNTER
----- Message from Med Assistant Briscoe sent at 5/28/2025 12:18 PM CDT -----  Pt calling stating he never received the cologuard kit Pt # [phone]
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
## 2025-06-03 ENCOUNTER — PATIENT OUTREACH (OUTPATIENT)
Dept: ADMINISTRATIVE | Facility: HOSPITAL | Age: 70
End: 2025-06-03
Payer: MEDICARE

## 2025-06-03 VITALS — DIASTOLIC BLOOD PRESSURE: 68 MMHG | SYSTOLIC BLOOD PRESSURE: 138 MMHG

## (undated) DEVICE — GLOVE BIOGEL PI GOLD SZ  8.5

## (undated) DEVICE — SUTURE ETHILON 3-0 PS-1 18 1663H

## (undated) DEVICE — TRAY UPPER EXTREMITY

## (undated) DEVICE — PADDING CAST 3 STERILE 30-320

## (undated) DEVICE — BANDAGE ACE STERILE 3 REB3113

## (undated) DEVICE — SOLUTION IRRI NS BOTTLE 1000ML R5200-01

## (undated) DEVICE — CUFF TOURNIQUET 18DUAL PRT 5921-218-235

## (undated) DEVICE — UNDERGLOVE BIOGEL MICRO BLUE SZ 8.5

## (undated) DEVICE — PAD BOVIE ADULT